# Patient Record
Sex: MALE | Race: WHITE | NOT HISPANIC OR LATINO | Employment: OTHER | URBAN - METROPOLITAN AREA
[De-identification: names, ages, dates, MRNs, and addresses within clinical notes are randomized per-mention and may not be internally consistent; named-entity substitution may affect disease eponyms.]

---

## 2017-02-20 ENCOUNTER — TRANSFERRED RECORDS (OUTPATIENT)
Dept: HEALTH INFORMATION MANAGEMENT | Facility: CLINIC | Age: 72
End: 2017-02-20

## 2017-02-21 ENCOUNTER — DOCUMENTATION ONLY (OUTPATIENT)
Dept: VASCULAR SURGERY | Facility: CLINIC | Age: 72
End: 2017-02-21

## 2017-04-13 ENCOUNTER — TELEPHONE (OUTPATIENT)
Dept: FAMILY MEDICINE | Facility: CLINIC | Age: 72
End: 2017-04-13

## 2017-04-13 NOTE — TELEPHONE ENCOUNTER
Patient calling to report that he went to give blood today and they rejected him as it was noted that he was having an irregular heart rate. They told him that it sounded like he was having extra beats. He was advised to follow up with his primary clinic. Patient reports no history of abnormal heart rhythm or heart issues. It is unknown how long he may have had this issue.    Abnormal heart rate     Onset: unknown      Intensity: mild    Progression of Symptoms:  same    Accompanying Signs & Symptoms:  Shortness of breath: no - although does feel winded when walking up stairs  Sweating: no   Nausea/vomiting: no  Lightheadedness: no  Palpitations: no  Fever/Chills: no  Cough: no  Heartburn: no    History:   Family history of heart disease no  Tobacco use: YES- former smoker, quit 5 years ago    Precipitating factors:   Worse with exertion: no  Worse with deep breaths :  no  Related to food: no    Alleviating factors:  none       Therapies Tried and outcome: none    Appointment scheduled with MD CALIN for 4/14/17. Advised patient of signs and symptoms to watch for that require emergency treatment.     Patient verbalized understanding and agrees with plan.    Will call back if further questions or concerns.    Myra Zavala, RN, BSN

## 2017-04-14 ENCOUNTER — OFFICE VISIT (OUTPATIENT)
Dept: FAMILY MEDICINE | Facility: CLINIC | Age: 72
End: 2017-04-14
Payer: COMMERCIAL

## 2017-04-14 VITALS
SYSTOLIC BLOOD PRESSURE: 114 MMHG | BODY MASS INDEX: 25.34 KG/M2 | HEIGHT: 71 IN | DIASTOLIC BLOOD PRESSURE: 74 MMHG | TEMPERATURE: 98.3 F | HEART RATE: 88 BPM | WEIGHT: 181 LBS | OXYGEN SATURATION: 95 %

## 2017-04-14 DIAGNOSIS — E11.9 TYPE 2 DIABETES MELLITUS WITHOUT COMPLICATION, WITHOUT LONG-TERM CURRENT USE OF INSULIN (H): ICD-10-CM

## 2017-04-14 DIAGNOSIS — Z71.89 ADVANCED DIRECTIVES, COUNSELING/DISCUSSION: ICD-10-CM

## 2017-04-14 DIAGNOSIS — I10 ESSENTIAL HYPERTENSION: ICD-10-CM

## 2017-04-14 DIAGNOSIS — E78.5 HYPERLIPIDEMIA LDL GOAL <70: ICD-10-CM

## 2017-04-14 DIAGNOSIS — I49.9 IRREGULAR HEART RHYTHM: Primary | ICD-10-CM

## 2017-04-14 LAB
ERYTHROCYTE [DISTWIDTH] IN BLOOD BY AUTOMATED COUNT: 13.1 % (ref 10–15)
HBA1C MFR BLD: 6.9 % (ref 4.3–6)
HCT VFR BLD AUTO: 50 % (ref 40–53)
HGB BLD-MCNC: 16.9 G/DL (ref 13.3–17.7)
MCH RBC QN AUTO: 30.4 PG (ref 26.5–33)
MCHC RBC AUTO-ENTMCNC: 33.8 G/DL (ref 31.5–36.5)
MCV RBC AUTO: 90 FL (ref 78–100)
PLATELET # BLD AUTO: 196 10E9/L (ref 150–450)
RBC # BLD AUTO: 5.56 10E12/L (ref 4.4–5.9)
WBC # BLD AUTO: 7.3 10E9/L (ref 4–11)

## 2017-04-14 PROCEDURE — 99214 OFFICE O/P EST MOD 30 MIN: CPT | Performed by: FAMILY MEDICINE

## 2017-04-14 PROCEDURE — 83036 HEMOGLOBIN GLYCOSYLATED A1C: CPT | Performed by: FAMILY MEDICINE

## 2017-04-14 PROCEDURE — 93000 ELECTROCARDIOGRAM COMPLETE: CPT | Performed by: FAMILY MEDICINE

## 2017-04-14 PROCEDURE — 36415 COLL VENOUS BLD VENIPUNCTURE: CPT | Performed by: FAMILY MEDICINE

## 2017-04-14 PROCEDURE — 80048 BASIC METABOLIC PNL TOTAL CA: CPT | Performed by: FAMILY MEDICINE

## 2017-04-14 PROCEDURE — 84443 ASSAY THYROID STIM HORMONE: CPT | Performed by: FAMILY MEDICINE

## 2017-04-14 PROCEDURE — 85027 COMPLETE CBC AUTOMATED: CPT | Performed by: FAMILY MEDICINE

## 2017-04-14 NOTE — ASSESSMENT & PLAN NOTE
Advance Care Planning 4/14/2017: ACP Review of Chart / Resources Provided:  Reviewed chart for advance care plan.  Jaylen ANDRIY De La Vega has no plan or code status on file however states presence of ACP document. Copy requested.   Added by Linda Wilcox

## 2017-04-14 NOTE — MR AVS SNAPSHOT
After Visit Summary   4/14/2017    Jaylen De La Vega    MRN: [id number]           Patient Information     Date Of Birth          1945        Visit Information        Provider Department      4/14/2017 9:40 AM Weiler, Karen, MD [facility] [place]        Today's Diagnoses     Irregular heart rhythm    -  1    Advanced directives, counseling/discussion        Hyperlipidemia LDL goal <70        Essential hypertension        Type 2 diabetes mellitus without complication, without long-term current use of insulin (H)           Follow-ups after your visit        Your next 10 appointments already scheduled     Apr 25, 2017 10:00 AM CDT   Holter Monitor with [facility] DEVICE [facility] ([facility])    [address]  [facility] [address] [phone]           LOCATION - [address], [address] [place] [address] **Please check-in at the [address] Registration Office, [address], in the [facility] building. When you are finished registering, please go to [address] and have a seat.              Future tests that were ordered for you today     Open Future Orders        Priority Expected Expires Ordered    Holter Monitor 48 hour - Adult Routine  5/29/2017 4/14/2017            Who to contact     If you have questions or need follow up information about today's clinic visit or your schedule please contact [facility] [place] directly at [phone].  Normal or non-critical lab and imaging results will be communicated to you by MyChart, letter or phone within 4 business days after the clinic has received the results. If you do not hear from us within 7 days, please contact the clinic through MyChart or phone. If you have a critical or abnormal lab result, we will notify you by phone as soon as possible.  Submit refill requests through [employer] or call your pharmacy and they will forward the refill request to us. Please  "allow 3 business days for your refill to be completed.          Additional Information About Your Visit        MyChart Information     [employer] gives you secure access to your electronic health record. If you see a primary care provider, you can also send messages to your care team and make appointments. If you have questions, please call your primary care clinic.  If you do not have a primary care provider, please call [phone] and they will assist you.        Care EveryWhere ID     This is your Care EveryWhere ID. This could be used by other organizations to access your [facility] medical records  [id number]        Your Vitals Were     Pulse Temperature Height Pulse Oximetry BMI (Body Mass Index)       88 98.3  F (36.8  C) (Oral) 5' 11\" (1.803 m) 95% 25.24 kg/m2        Blood Pressure from Last 3 Encounters:   04/14/17 114/74   12/13/16 121/74   10/05/16 107/64    Weight from Last 3 Encounters:   04/14/17 181 lb (82.1 kg)   12/13/16 180 lb (81.6 kg)   10/05/16 178 lb (80.7 kg)              We Performed the Following     Basic metabolic panel     CBC with platelets     EKG 12-lead complete w/read - Clinics     Hemoglobin A1c     TSH WITH FREE T4 REFLEX        Primary Care Provider Office Phone # Fax #    Tj Verma Jr., [phone]       [facility] [address]  [place] [address]        Thank you!     Thank you for choosing [facility]  for your care. Our goal is always to provide you with excellent care. Hearing back from our patients is one way we can continue to improve our services. Please take a few minutes to complete the written survey that you may receive in the mail after your visit with us. Thank you!             Your Updated Medication List - Protect others around you: Learn how to safely use, store and throw away your medicines at www.disposemymeds.org.          This list is accurate as of: 4/14/17  6:40 PM.  Always use your most recent med list.    "                Brand Name Dispense Instructions for use    albuterol 108 (90 BASE) MCG/ACT Inhaler    PROAIR HFA/PROVENTIL HFA/VENTOLIN HFA    1 Inhaler    Inhale 2 puffs into the lungs every 6 hours as needed for shortness of breath / dyspnea or wheezing       aspirin 81 MG tablet     30 tablet    Take 1 tablet (81 mg) by mouth daily       blood glucose monitoring test strip    ONE TOUCH VERIO IQ    100 each    by In Vitro route 2 times daily       carisoprodol 350 MG tablet    SOMA    60 tablet    Take 1 tablet (350 mg) by mouth 3 times daily as needed for muscle spasms       cetirizine 10 MG tablet    zyrTEC    30 tablet    Take 1 tablet (10 mg) by mouth every evening       fluticasone 50 MCG/ACT spray    FLONASE    1 Bottle    Spray 1-2 sprays into both nostrils daily       losartan-hydrochlorothiazide 100-25 MG per tablet    HYZAAR    90 tablet    Take 1 tablet by mouth daily       metFORMIN 500 MG tablet    GLUCOPHAGE    180 tablet    Take 1 tablet (500 mg) by mouth 2 times daily (with meals)       nitroglycerin 0.4 MG sublingual tablet    NITROSTAT    25 tablet    Place 1 tablet under the tongue every 5 minutes as needed for chest pain.       ONETOUCH DELICA LANCETS 33G Misc     100 each    1 each 2 times daily       simvastatin 20 MG tablet    ZOCOR    90 tablet    Take 1 tablet (20 mg) by mouth At Bedtime at bedtime.       VITAMIN B-1 PO      Take 50 mg by mouth       VITAMIN D PO      1 tablet qd

## 2017-04-14 NOTE — NURSING NOTE
"Chief Complaint   Patient presents with     Irregular Heart Beat       Initial /74  Pulse 88  Temp 98.3  F (36.8  C) (Oral)  Ht 5' 11\" (1.803 m)  Wt 181 lb (82.1 kg)  SpO2 95%  BMI 25.24 kg/m2 Estimated body mass index is 25.24 kg/(m^2) as calculated from the following:    Height as of this encounter: 5' 11\" (1.803 m).    Weight as of this encounter: 181 lb (82.1 kg).  Medication Reconciliation: complete   Linda iWlcox Medical Assistant      "

## 2017-04-14 NOTE — PROGRESS NOTES
"  SUBJECTIVE:                                                    Jaylen De La Vega is a 71 year old male who presents to clinic today for the following health issues:      Abnormal heart rate     Onset: unknown       Intensity: mild    Progression of Symptoms: same    Accompanying Signs & Symptoms:  Shortness of breath: no - although does feel winded when walking up stairs  Sweating: no   Nausea/vomiting: no  Lightheadedness: no  Palpitations: no  Fever/Chills: no  Cough: no  Heartburn: no    History:   Family history of heart disease no  Tobacco use: YES- former smoker, quit 5 years ago    Precipitating factors:   Worse with exertion: no  Worse with deep breaths : no  Related to food: no    Alleviating factors:  none     Therapies Tried and outcome: none     Cardiac:  Patient reports he went to donate blood yesterday, and was informed he has an irregular heart beat, and recommended to see his primary care doctor. He was not symptomatic at the time, and does not have a history of irregular heart beats in the past that he is aware of. He admits to a history of two episodes mild chest discomfort approximately two weeks ago, but has treated it with aspirin. He has had 2-3 stress tests taken, all of which have come back normal. He walks regularly, 2-3 miles a couple times a day. He is finding it harder to keep walking pace with his sister who is 10 years younger (and taller), but attributes that to age.     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 He states he sleeps \"with one ear open,\" and always has his phone on and next to him in case his wife calls him for help. He denies feeling more tired than usual.    Ear:  Patient reports he has an oily drainage from his ears whenever he sleeps on his sides, occurring for the past couple of years. If he doesn't wipe it right away it itches and burns. He has also noted stains on his pillow. He denies changes in his " "hearing.     Blood sugars:  Patient reports he does not check his blood sugars are regular as he should. He checked it this morning, and it read 160, which he believes is higher than it should be. He has not eaten yet today.     Problem list and histories reviewed & adjusted, as indicated.  Additional history: as documented    Reviewed and updated as needed this visit by clinical staff  Tobacco  Allergies  Meds  Med Hx  Surg Hx  Fam Hx  Soc Hx      Reviewed and updated as needed this visit by Provider       ROS:  Constitutional, HEENT, cardiovascular, pulmonary, gi and gu systems are negative, except as otherwise noted.    OBJECTIVE:                                                    /74  Pulse 88  Temp 98.3  F (36.8  C) (Oral)  Ht 5' 11\" (1.803 m)  Wt 181 lb (82.1 kg)  SpO2 95%  BMI 25.24 kg/m2  Body mass index is 25.24 kg/(m^2).  GENERAL: healthy, alert and no distress  HENT: ear canals and TM's normal, nose and mouth without ulcers or lesions  NECK: no adenopathy, no asymmetry, masses, or scars and thyroid normal to palpation  RESP: lungs clear to auscultation - no rales, rhonchi or wheezes  CV: regular rate and rhythm, normal S1 S2, no S3 or S4, no murmur, click or rub, no peripheral edema and peripheral pulses strong  NEURO: mentation intact and speech normal  PSYCH: mentation appears normal, affect normal/bright    Diagnostic Test Results:  EKG was done. NSR. No ST changes or arrhythmia    Lab Results   Component Value Date    A1C 6.9 04/14/2017    A1C 6.9 12/13/2016    A1C 7.2 06/25/2016    A1C 6.8 08/08/2015          ASSESSMENT/PLAN:                                                    (I49.9) Irregular heart rhythm  (primary encounter diagnosis)  Comment: Irregular rate and rhythm heard when patient went to give blood yesterday. History of HTN, HLD, and diabetes. Appears normal on exam here in clinic. Patient is asymptomatic. Will check labs today. Will do 48-hour heart monitor to ensure " everything is ok. If patient becomes symptomatic, go to ER for evaluation.  Plan: EKG 12-lead complete w/read - Clinics, TSH WITH        FREE T4 REFLEX, CBC with platelets, Basic         metabolic panel, Holter Monitor 48 hour - Adult        Follow up based on labs      (E78.5) Hyperlipidemia LDL goal <70  Comment: Stable  Plan: Follow up in 6mo      (I10) Essential hypertension  Comment: Blood pressure under good control today  Plan: Follow up in 6mo      (E11.9) Type 2 diabetes mellitus without complication, without long-term current use of insulin (H)  Comment: On metformin.  A1c is under good control today.We'll continue with metformin.  Plan: TSH WITH FREE T4 REFLEX, Hemoglobin A1c        Follow up based on labs        The information in this document, created by the medical scribe for me, accurately reflects the services I personally performed and the decisions made by me. I have reviewed and approved this document for accuracy prior to leaving the patient care area.  4/14/2017 10:09 AM         Karen Weiler, MD  [facility]

## 2017-04-15 LAB
ANION GAP SERPL CALCULATED.3IONS-SCNC: 6 MMOL/L (ref 3–14)
BUN SERPL-MCNC: 19 MG/DL (ref 7–30)
CALCIUM SERPL-MCNC: 9.8 MG/DL (ref 8.5–10.1)
CHLORIDE SERPL-SCNC: 102 MMOL/L (ref 94–109)
CO2 SERPL-SCNC: 32 MMOL/L (ref 20–32)
CREAT SERPL-MCNC: 1.11 MG/DL (ref 0.66–1.25)
GFR SERPL CREATININE-BSD FRML MDRD: 65 ML/MIN/1.7M2
GLUCOSE SERPL-MCNC: 126 MG/DL (ref 70–99)
POTASSIUM SERPL-SCNC: 4.5 MMOL/L (ref 3.4–5.3)
SODIUM SERPL-SCNC: 140 MMOL/L (ref 133–144)
TSH SERPL DL<=0.005 MIU/L-ACNC: 1.61 MU/L (ref 0.4–4)

## 2017-04-17 DIAGNOSIS — I10 ESSENTIAL HYPERTENSION: ICD-10-CM

## 2017-04-17 NOTE — TELEPHONE ENCOUNTER
losartan-hydrochlorothiazide (HYZAAR) 100-25 MG per tablet      Last Written Prescription Date: 11/2/2016  Last Fill Quantity: 90 tablet, # refills: 1  Last Office Visit with [facility], [facility] or [facility] prescribing provider: 4/14/2017       Potassium   Date Value Ref Range Status   04/14/2017 4.5 3.4 - 5.3 mmol/L Final     Creatinine   Date Value Ref Range Status   04/14/2017 1.11 0.66 - 1.25 mg/dL Final     BP Readings from Last 3 Encounters:   04/14/17 114/74   12/13/16 121/74   10/05/16 107/64

## 2017-04-18 NOTE — PROGRESS NOTES
Dear Jaylen,    Dr. Weiler has reviewed your recent labs, which were normal other than an elevated glucose. Your A1c level is at goal. Please continue on your Metformin and follow a low carbohydrate diet. A diabetic follow-up appointment in 3-6 months is recommended.    Please contact the clinic if you have additional questions.  Thank you.    Sincerely,    Sonja Arango PA-C  Physician extender for Dr. Karen Weiler

## 2017-04-19 RX ORDER — LOSARTAN POTASSIUM AND HYDROCHLOROTHIAZIDE 25; 100 MG/1; MG/1
1 TABLET ORAL DAILY
Qty: 90 TABLET | Refills: 3 | Status: SHIPPED | OUTPATIENT
Start: 2017-04-19 | End: 2018-05-04

## 2017-04-19 NOTE — TELEPHONE ENCOUNTER
Prescription approved per [facility] Refill Protocol.  Michelle Gleason RN   [facility] - Triage

## 2017-04-25 ENCOUNTER — HOSPITAL ENCOUNTER (OUTPATIENT)
Dept: CARDIOLOGY | Facility: CLINIC | Age: 72
Discharge: HOME | End: 2017-04-25
Attending: FAMILY MEDICINE | Admitting: FAMILY MEDICINE
Payer: MEDICARE

## 2017-04-25 DIAGNOSIS — I49.9 IRREGULAR HEART RHYTHM: ICD-10-CM

## 2017-04-25 PROCEDURE — 93226 XTRNL ECG REC<48 HR SCAN A/R: CPT

## 2017-04-25 PROCEDURE — 93227 XTRNL ECG REC<48 HR R&I: CPT | Performed by: INTERNAL MEDICINE

## 2017-05-01 PROBLEM — I49.1 PREMATURE ATRIAL CONTRACTIONS: Status: ACTIVE | Noted: 2017-05-01

## 2017-05-01 NOTE — PROGRESS NOTES
Mr. De La Vega,    Your 48 hour heart monitor showed multiple irregular beats each consistent with a non-worrisome supraventricular pre-mature beat.  Essentially, these are palpitations.  They sometimes cause symptoms, but many times patients don't even notice they're there.  This would explain the recent irregular heartbeat noted when you donated blood.  No further workup nor treatment is necessary given the normal EKG done with Dr. Weiler recently and now the findings on your 48 hour heart monitor.      If you have further questions about the interpretation of your labs, labtestsonline.org is a good website to check out for further information.    Please contact the clinic if you have additional questions.  Thank you.    Sincerely,    Tj Verma MD

## 2017-05-10 ENCOUNTER — TELEPHONE (OUTPATIENT)
Dept: FAMILY MEDICINE | Facility: CLINIC | Age: 72
End: 2017-05-10

## 2017-05-10 NOTE — TELEPHONE ENCOUNTER
I called and reviewed his Holter monitoring. We discussed in detail per his 48 hour Holter monitor report, and he has no further questions at this time. I advised him to call us back should any questions or concerns arise. Kathleen Faust R.N.

## 2017-05-10 NOTE — TELEPHONE ENCOUNTER
Reason for Call: Patient called in to get the results of his heart monitor test    Best phone number to reach pt at is: [phone] or [phone]  Ok to leave a message with medical info? YES    Pharmacy preferred (if calling for a refill): [facility]  [employer] [facility]-Patient Representative

## 2017-05-19 DIAGNOSIS — E78.2 MIXED HYPERLIPIDEMIA: ICD-10-CM

## 2017-05-19 RX ORDER — SIMVASTATIN 20 MG
20 TABLET ORAL AT BEDTIME
Qty: 90 TABLET | Refills: 0 | Status: SHIPPED | OUTPATIENT
Start: 2017-05-19 | End: 2017-09-12

## 2017-05-19 NOTE — TELEPHONE ENCOUNTER
Prescription approved per [facility], [facility] or [facility] refill protocol.  Martine Montez RN  Triage [employer]

## 2017-05-19 NOTE — TELEPHONE ENCOUNTER
simvastatin (ZOCOR) 20 MG tablet     Last Written Prescription Date: 10/24/2016  Last Fill Quantity: 90 tablet, # refills: 1  Last Office Visit with [facility], [facility] or [facility] prescribing provider: 4/14/2017       Lab Results   Component Value Date    CHOL 142 06/25/2016     Lab Results   Component Value Date    HDL 41 06/25/2016     Lab Results   Component Value Date    LDL 76 06/25/2016     Lab Results   Component Value Date    TRIG 125 06/25/2016     Lab Results   Component Value Date    CHOLHDLRJADEO 4.3 06/10/2014

## 2017-06-05 ENCOUNTER — TELEPHONE (OUTPATIENT)
Dept: FAMILY MEDICINE | Facility: CLINIC | Age: 72
End: 2017-06-05

## 2017-06-05 NOTE — TELEPHONE ENCOUNTER
Spoke with Vanesa, who was transferred to be after scheduling appointment for Jaylen next week. She says he is having issues with allergies this year. Has taken OTC allergy medications. Now using Flonase or similar but has not used consistently or for less than a few weeks. I reviewed how to take OTC allergy medications and that will get peak results after using for at least a few weeks.     She discussed Holter report and he was unable to give blood due to his irregular heart rate. I did speak directly with Alex regarding this previously. I informed I will mail them a paper copy of Dr. Verma's interpretation/note regarding the 48 hour Holter that can be found attached in Epic to the Holter report. Vanesa said this would be really helpful.     Says he is also having some shortness of breath more often and with exertion. She says it has been building up over many months. Patient will be seeing Dr. Verma next week, advised to discuss at that time and to call us back if symptoms worsen prior to that appointment. Vanesa in agreement with this plan. Kathleen Faust R.N.

## 2017-07-12 DIAGNOSIS — E11.9 TYPE 2 DIABETES MELLITUS WITHOUT COMPLICATION, WITHOUT LONG-TERM CURRENT USE OF INSULIN (H): ICD-10-CM

## 2017-07-12 NOTE — TELEPHONE ENCOUNTER
metFORMIN (GLUCOPHAGE) 500 MG tablet         Last Written Prescription Date: 12/13/2016  Last Fill Quantity: 180 tablet, # refills: 1  Last Office Visit with [facility], [facility] or [facility] prescribing provider:  4/14/2017        BP Readings from Last 3 Encounters:   04/14/17 114/74   12/13/16 121/74   10/05/16 107/64     Lab Results   Component Value Date    MICROL 15 12/13/2016     Lab Results   Component Value Date    UMALCR 11.41 12/13/2016     Creatinine   Date Value Ref Range Status   04/14/2017 1.11 0.66 - 1.25 mg/dL Final   ]  GFR Estimate   Date Value Ref Range Status   04/14/2017 65 >60 mL/min/1.7m2 Final     Comment:     Non  GFR Calc   06/25/2016 68 >60 mL/min/1.7m2 Final     Comment:     Non  GFR Calc   06/01/2015 70 >60 mL/min/1.7m2 Final     Comment:     Non  GFR Calc     GFR Estimate If Black   Date Value Ref Range Status   04/14/2017 79 >60 mL/min/1.7m2 Final     Comment:      GFR Calc   06/25/2016 83 >60 mL/min/1.7m2 Final     Comment:      GFR Calc   06/01/2015 85 >60 mL/min/1.7m2 Final     Comment:      GFR Calc     Lab Results   Component Value Date    CHOL 142 06/25/2016     Lab Results   Component Value Date    HDL 41 06/25/2016     Lab Results   Component Value Date    LDL 76 06/25/2016     Lab Results   Component Value Date    TRIG 125 06/25/2016     Lab Results   Component Value Date    CHOLHDLRATIO 4.3 06/10/2014     Lab Results   Component Value Date    AST 26 06/25/2016     Lab Results   Component Value Date    ALT 33 06/25/2016     Lab Results   Component Value Date    A1C 6.9 04/14/2017    A1C 6.9 12/13/2016    A1C 7.2 06/25/2016    A1C 6.8 08/08/2015     Potassium   Date Value Ref Range Status   04/14/2017 4.5 3.4 - 5.3 mmol/L Final

## 2017-07-13 NOTE — TELEPHONE ENCOUNTER
Prescription approved per [facility] Refill Protocol.  Kathleen Faust R.N.

## 2017-09-12 DIAGNOSIS — E78.2 MIXED HYPERLIPIDEMIA: ICD-10-CM

## 2017-09-12 NOTE — TELEPHONE ENCOUNTER
simvastatin (ZOCOR) 20 MG tablet  Last Written Prescription Date: 5/19/2017  Last Fill Quantity: 90 tablet, # refills: 0  Last Office Visit with [facility], [facility] or [facility] prescribing provider: 4/14/2017       Lab Results   Component Value Date    CHOL 142 06/25/2016     Lab Results   Component Value Date    HDL 41 06/25/2016     Lab Results   Component Value Date    LDL 76 06/25/2016     Lab Results   Component Value Date    TRIG 125 06/25/2016     Lab Results   Component Value Date    CHOLHDLRJADEO 4.3 06/10/2014

## 2017-09-14 RX ORDER — SIMVASTATIN 20 MG
20 TABLET ORAL AT BEDTIME
Qty: 30 TABLET | Refills: 0 | Status: SHIPPED | OUTPATIENT
Start: 2017-09-14 | End: 2017-10-17

## 2017-09-14 NOTE — TELEPHONE ENCOUNTER
Medication is being filled for 1 time refill only due to:  Patient needs labs FLP. Routing to team to inform and assist in scheduling.  Michelle Gleason RN   [facility] - Triage

## 2017-09-15 ENCOUNTER — TELEPHONE (OUTPATIENT)
Dept: FAMILY MEDICINE | Facility: CLINIC | Age: 72
End: 2017-09-15

## 2017-09-15 DIAGNOSIS — E11.9 TYPE 2 DIABETES MELLITUS WITHOUT COMPLICATION, WITHOUT LONG-TERM CURRENT USE OF INSULIN (H): Primary | ICD-10-CM

## 2017-09-15 DIAGNOSIS — E78.5 HYPERLIPIDEMIA LDL GOAL <70: ICD-10-CM

## 2017-09-15 NOTE — TELEPHONE ENCOUNTER
This patient is scheduled for a DM check on 10/17/2017 with fasting labs scheduled for 10/14/2017.  Labs have been pended.  Provider, please review orders, attach dx and add any additional labs needed.  Thank you.  Shagufta Wilson

## 2017-10-14 DIAGNOSIS — E78.5 HYPERLIPIDEMIA LDL GOAL <70: ICD-10-CM

## 2017-10-14 DIAGNOSIS — E11.9 TYPE 2 DIABETES MELLITUS WITHOUT COMPLICATION, WITHOUT LONG-TERM CURRENT USE OF INSULIN (H): ICD-10-CM

## 2017-10-14 LAB
CHOLEST SERPL-MCNC: 150 MG/DL
CREAT UR-MCNC: 145 MG/DL
HBA1C MFR BLD: 7.3 % (ref 4.3–6)
HDLC SERPL-MCNC: 49 MG/DL
LDLC SERPL CALC-MCNC: 78 MG/DL
MICROALBUMIN UR-MCNC: 84 MG/L
MICROALBUMIN/CREAT UR: 57.86 MG/G CR (ref 0–17)
NONHDLC SERPL-MCNC: 101 MG/DL
TRIGL SERPL-MCNC: 113 MG/DL
TSH SERPL DL<=0.005 MIU/L-ACNC: 1.29 MU/L (ref 0.4–4)

## 2017-10-14 PROCEDURE — 80061 LIPID PANEL: CPT | Performed by: FAMILY MEDICINE

## 2017-10-14 PROCEDURE — 84443 ASSAY THYROID STIM HORMONE: CPT | Performed by: FAMILY MEDICINE

## 2017-10-14 PROCEDURE — 82043 UR ALBUMIN QUANTITATIVE: CPT | Performed by: FAMILY MEDICINE

## 2017-10-14 PROCEDURE — 36415 COLL VENOUS BLD VENIPUNCTURE: CPT | Performed by: FAMILY MEDICINE

## 2017-10-14 PROCEDURE — 83036 HEMOGLOBIN GLYCOSYLATED A1C: CPT | Performed by: FAMILY MEDICINE

## 2017-10-17 ENCOUNTER — OFFICE VISIT (OUTPATIENT)
Dept: FAMILY MEDICINE | Facility: CLINIC | Age: 72
End: 2017-10-17
Payer: COMMERCIAL

## 2017-10-17 VITALS
OXYGEN SATURATION: 95 % | SYSTOLIC BLOOD PRESSURE: 128 MMHG | DIASTOLIC BLOOD PRESSURE: 76 MMHG | WEIGHT: 176 LBS | HEIGHT: 71 IN | HEART RATE: 105 BPM | BODY MASS INDEX: 24.64 KG/M2 | TEMPERATURE: 97.6 F

## 2017-10-17 DIAGNOSIS — E78.2 MIXED HYPERLIPIDEMIA: ICD-10-CM

## 2017-10-17 DIAGNOSIS — E11.9 TYPE 2 DIABETES MELLITUS WITHOUT COMPLICATION, WITHOUT LONG-TERM CURRENT USE OF INSULIN (H): Primary | ICD-10-CM

## 2017-10-17 DIAGNOSIS — J44.9 CHRONIC OBSTRUCTIVE PULMONARY DISEASE, UNSPECIFIED COPD TYPE (H): ICD-10-CM

## 2017-10-17 DIAGNOSIS — Z23 NEED FOR PROPHYLACTIC VACCINATION AND INOCULATION AGAINST INFLUENZA: ICD-10-CM

## 2017-10-17 PROCEDURE — 99214 OFFICE O/P EST MOD 30 MIN: CPT | Mod: 25 | Performed by: FAMILY MEDICINE

## 2017-10-17 PROCEDURE — 90662 IIV NO PRSV INCREASED AG IM: CPT | Performed by: FAMILY MEDICINE

## 2017-10-17 PROCEDURE — G0008 ADMIN INFLUENZA VIRUS VAC: HCPCS | Performed by: FAMILY MEDICINE

## 2017-10-17 RX ORDER — ALBUTEROL SULFATE 90 UG/1
2 AEROSOL, METERED RESPIRATORY (INHALATION) EVERY 6 HOURS PRN
Qty: 1 INHALER | Refills: 3 | Status: SHIPPED | OUTPATIENT
Start: 2017-10-17 | End: 2018-09-24

## 2017-10-17 RX ORDER — SIMVASTATIN 20 MG
20 TABLET ORAL AT BEDTIME
Qty: 90 TABLET | Refills: 3 | Status: SHIPPED | OUTPATIENT
Start: 2017-10-17 | End: 2018-09-24

## 2017-10-17 NOTE — MR AVS SNAPSHOT
After Visit Summary   10/17/2017    Jaylen De La Vega    MRN: [id number]           Patient Information     Date Of Birth          1945        Visit Information        Provider Department      10/17/2017 8:40 AM Tj Verma Jr., MD [facility]        Today's Diagnoses     Type 2 diabetes mellitus without complication, without long-term current use of insulin (H)    -  1    Mixed hyperlipidemia        Need for prophylactic vaccination and inoculation against influenza        Chronic obstructive pulmonary disease, unspecified COPD type (H)           Follow-ups after your visit        Follow-up notes from your care team     Return in about 6 months (around 4/17/2018) for Diabetes Recheck.      Who to contact     If you have questions or need follow up information about today's clinic visit or your schedule please contact [facility] directly at [phone].  Normal or non-critical lab and imaging results will be communicated to you by MyChart, letter or phone within 4 business days after the clinic has received the results. If you do not hear from us within 7 days, please contact the clinic through [employer] or phone. If you have a critical or abnormal lab result, we will notify you by phone as soon as possible.  Submit refill requests through [facility] or call your pharmacy and they will forward the refill request to us. Please allow 3 business days for your refill to be completed.          Additional Information About Your Visit        [employer] Information     [employer] gives you secure access to your electronic health record. If you see a primary care provider, you can also send messages to your care team and make appointments. If you have questions, please call your primary care clinic.  If you do not have a primary care provider, please call [phone] and they will assist you.        Care EveryWhere ID     This is your Care EveryWhere ID. This could be used by other  "organizations to access your [place] medical records  [id number]        Your Vitals Were     Pulse Temperature Height Pulse Oximetry BMI (Body Mass Index)       105 97.6  F (36.4  C) (Oral) 5' 11\" (1.803 m) 95% 24.55 kg/m2        Blood Pressure from Last 3 Encounters:   10/17/17 144/84   04/14/17 114/74   12/13/16 121/74    Weight from Last 3 Encounters:   10/17/17 176 lb (79.8 kg)   04/14/17 181 lb (82.1 kg)   12/13/16 180 lb (81.6 kg)              We Performed the Following     FLU VACCINE, INCREASED ANTIGEN, PRESV FREE          Where to get your medicines      These medications were sent to [facility] [address] [facility] - [place], [place] [facility] [address] [address]  [address] [facility] [address] [facility] [address]     Phone:  [phone]     albuterol 108 (90 BASE) MCG/ACT Inhaler    simvastatin 20 MG tablet          Primary Care Provider Office Phone # Fax #    Tj Verma Jr., [phone]  [place] [address]        Equal Access to Services     [facility]: Hadii aad ku hadasho Soomaali, waaxda luqadaha, qaybta kaalmada adeegyada, waxay maryin haymarlysn haylee jean ah. So [facility] [phone].    ATENCIÓN: Si habla español, tiene a cunningham disposición servicios gratuitos de asistencia lingüística. Llame al [phone].    We comply with applicable federal civil rights laws and [place] laws. We do not discriminate on the basis of race, color, national origin, age, disability, sex, sexual orientation, or gender identity.            Thank you!     Thank you for choosing [facility]  for your care. Our goal is always to provide you with excellent care. Hearing back from our patients is one way we can continue to improve our services. Please take a few minutes to complete the written survey that you may receive in the mail after your visit with us. Thank you!             Your Updated Medication List - Protect others around you: Learn how to safely use, store and throw away your " medicines at www.disposemymeds.org.          This list is accurate as of: 10/17/17  9:16 AM.  Always use your most recent med list.                   Brand Name Dispense Instructions for use Diagnosis    albuterol 108 (90 BASE) MCG/ACT Inhaler    PROAIR HFA/PROVENTIL HFA/VENTOLIN HFA    1 Inhaler    Inhale 2 puffs into the lungs every 6 hours as needed for shortness of breath / dyspnea or wheezing    Chronic obstructive pulmonary disease, unspecified COPD type (H)       aspirin 81 MG tablet     30 tablet    Take 1 tablet (81 mg) by mouth daily    Type 2 diabetes mellitus without complication, without long-term current use of insulin (H)       blood glucose monitoring test strip    ONE TOUCH VERIO IQ    100 each    by In Vitro route 2 times daily    Type 2 diabetes mellitus without complication, without long-term current use of insulin (H)       carisoprodol 350 MG tablet    SOMA    60 tablet    Take 1 tablet (350 mg) by mouth 3 times daily as needed for muscle spasms    Spasm of back muscles       cetirizine 10 MG tablet    zyrTEC    30 tablet    Take 1 tablet (10 mg) by mouth every evening    Post-nasal drip       fluticasone 50 MCG/ACT spray    FLONASE    1 Bottle    Spray 1-2 sprays into both nostrils daily    Seasonal allergic rhinitis       losartan-hydrochlorothiazide 100-25 MG per tablet    HYZAAR    90 tablet    Take 1 tablet by mouth daily    Essential hypertension       metFORMIN 500 MG tablet    GLUCOPHAGE    180 tablet    Take 1 tablet (500 mg) by mouth 2 times daily (with meals)    Type 2 diabetes mellitus without complication, without long-term current use of insulin (H)       nitroGLYcerin 0.4 MG sublingual tablet    NITROSTAT    25 tablet    Place 1 tablet under the tongue every 5 minutes as needed for chest pain.    Chest pain       ONETOUCH DELICA LANCETS 33G Misc     100 each    1 each 2 times daily    Type 2 diabetes mellitus without complication, without long-term current use of insulin (H)        simvastatin 20 MG tablet    ZOCOR    90 tablet    Take 1 tablet (20 mg) by mouth At Bedtime at bedtime.    Mixed hyperlipidemia       VITAMIN B-1 PO      Take 50 mg by mouth        VITAMIN D PO      1 tablet qd

## 2017-10-17 NOTE — PROGRESS NOTES
Mr. De La Vega,    -All of your labs are normal (for the most part).  We'll discuss your lab results at your upcoming appointment tomorrow.    If you have further questions about the interpretation of your labs, [email or web address] is a good website to check out for further information.    Please contact the clinic if you have additional questions.  Thank you.    Sincerely,    Tj Verma MD

## 2017-10-17 NOTE — NURSING NOTE
"Chief Complaint   Patient presents with     Diabetes       Initial /84  Pulse 105  Temp 97.6  F (36.4  C) (Oral)  Ht 5' 11\" (1.803 m)  Wt 176 lb (79.8 kg)  SpO2 95%  BMI 24.55 kg/m2 Estimated body mass index is 24.55 kg/(m^2) as calculated from the following:    Height as of this encounter: 5' 11\" (1.803 m).    Weight as of this encounter: 176 lb (79.8 kg).  Medication Reconciliation: complete    "

## 2017-10-17 NOTE — PROGRESS NOTES
SUBJECTIVE:   Jaylen De La Vega is a 71 year old male who presents to clinic today for the following health issues:      Diabetes Follow-up    Patient is checking blood sugars: 3-4 times a week; usually in 120's. Missed a few doses of Metformin recently and sugars have been a little high    Diabetic concerns: None     Symptoms of hypoglycemia (low blood sugar): none     Paresthesias (numbness or burning in feet) or sores: No     Date of last diabetic eye exam: February 2017        Amount of exercise or physical activity: 6-7 days/week for an average of 30-45 minutes    Problems taking medications regularly: No, lately has missed a few doses    Medication side effects: none  Diet: regular (no restrictions)        Problem list and histories reviewed & adjusted, as indicated.  Additional history: as documented    Recent Labs   Lab Test  10/14/17   0822  10/14/17   0820  04/14/17   1040  12/13/16   1110  06/25/16   0933   06/01/15   1444  06/10/14   1112   A1C  7.3*   --   6.9*  6.9*  7.2*   < >   --    --    LDL   --   78   --    --   76   --    --   73   HDL   --   49   --    --   41   --    --   34*   TRIG   --   113   --    --   125   --    --   195*   ALT   --    --    --    --   33   --   31  31   CR   --    --   1.11   --   1.07   --   1.05  1.06   GFRESTIMATED   --    --   65   --   68   --   70  70   GFRESTBLACK   --    --   79   --   83   --   85  84   POTASSIUM   --    --   4.5   --   3.6   --   3.7  4.0   TSH   --   1.29  1.61   --    --    --    --    --     < > = values in this interval not displayed.      BP Readings from Last 3 Encounters:   10/17/17 128/76   04/14/17 114/74   12/13/16 121/74    Wt Readings from Last 3 Encounters:   10/17/17 176 lb (79.8 kg)   04/14/17 181 lb (82.1 kg)   12/13/16 180 lb (81.6 kg)                  Labs reviewed in EPIC        Reviewed and updated as needed this visit by clinical staffAllergies  Meds       Reviewed and updated as needed this visit by Provider      "    ROS:  Constitutional, HEENT, cardiovascular, pulmonary, gi and gu systems are negative, except as otherwise noted.      OBJECTIVE:   /76  Pulse 105  Temp 97.6  F (36.4  C) (Oral)  Ht 5' 11\" (1.803 m)  Wt 176 lb (79.8 kg)  SpO2 95%  BMI 24.55 kg/m2  Body mass index is 24.55 kg/(m^2).  GENERAL: healthy, alert and no distress    Diagnostic Test Results:  No results found for this or any previous visit (from the past 24 hour(s)).    ASSESSMENT/PLAN:             1. Type 2 diabetes mellitus without complication, without long-term current use of insulin (H)  Continued good glycemic control with hemoglobin A1c of 7.3. He has some urine albumin presents about 50 but is on an angiotensin receptor blocker and blood pressure is at goal today. Follow-up with me in 6 months.    2. Mixed hyperlipidemia  Lipid panel shows LDL cholesterol less than 100. Continue on simvastatin.  - simvastatin (ZOCOR) 20 MG tablet; Take 1 tablet (20 mg) by mouth At Bedtime at bedtime.  Dispense: 90 tablet; Refill: 3    3. Need for prophylactic vaccination and inoculation against influenza  Flu vaccine is given today.  - FLU VACCINE, INCREASED ANTIGEN, PRESV FREE    4. Chronic obstructive pulmonary disease, unspecified COPD type (H)  Lung disease is stable. Albuterol is renewed.  - albuterol (PROAIR HFA/PROVENTIL HFA/VENTOLIN HFA) 108 (90 BASE) MCG/ACT Inhaler; Inhale 2 puffs into the lungs every 6 hours as needed for shortness of breath / dyspnea or wheezing  Dispense: 1 Inhaler; Refill: 3    Nik is been under increasing stress as his wife Amie has progressed significantly in her heart failure and other chronic medical problems. [facility] home health and hospice are working with her in their home. Nik is not getting much sleep and is spending increasing amounts of time tending to his wife's needs and her terminal illnesses.    See Patient Instructions    Tj Verma Jr, MD  [facility]    "

## 2017-11-01 ENCOUNTER — OFFICE VISIT (OUTPATIENT)
Dept: FAMILY MEDICINE | Facility: CLINIC | Age: 72
End: 2017-11-01
Payer: COMMERCIAL

## 2017-11-01 VITALS
OXYGEN SATURATION: 96 % | WEIGHT: 176 LBS | HEIGHT: 71 IN | BODY MASS INDEX: 24.64 KG/M2 | SYSTOLIC BLOOD PRESSURE: 122 MMHG | TEMPERATURE: 98 F | DIASTOLIC BLOOD PRESSURE: 70 MMHG | HEART RATE: 110 BPM

## 2017-11-01 DIAGNOSIS — J06.9 VIRAL UPPER RESPIRATORY TRACT INFECTION: Primary | ICD-10-CM

## 2017-11-01 PROCEDURE — 99213 OFFICE O/P EST LOW 20 MIN: CPT | Performed by: FAMILY MEDICINE

## 2017-11-01 NOTE — MR AVS SNAPSHOT
"              After Visit Summary   11/1/2017    Jaylen De La Vega    MRN: [id number]           Patient Information     Date Of Birth          1945        Visit Information        Provider Department      11/1/2017 2:20 PM Tj Verma Jr., MD [facility]        Today's Diagnoses     Viral upper respiratory tract infection    -  1       Follow-ups after your visit        Follow-up notes from your care team     Return in about 10 days (around 11/11/2017), or if symptoms worsen or fail to improve.      Who to contact     If you have questions or need follow up information about today's clinic visit or your schedule please contact [facility] directly at [phone].  Normal or non-critical lab and imaging results will be communicated to you by MyChart, letter or phone within 4 business days after the clinic has received the results. If you do not hear from us within 7 days, please contact the clinic through [employer] or phone. If you have a critical or abnormal lab result, we will notify you by phone as soon as possible.  Submit refill requests through [facility] or call your pharmacy and they will forward the refill request to us. Please allow 3 business days for your refill to be completed.          Additional Information About Your Visit        MyChart Information     [facility] gives you secure access to your electronic health record. If you see a primary care provider, you can also send messages to your care team and make appointments. If you have questions, please call your primary care clinic.  If you do not have a primary care provider, please call [phone] and they will assist you.        Care EveryWhere ID     This is your Care EveryWhere ID. This could be used by other organizations to access your [facility] medical records  [id number]        Your Vitals Were     Pulse Temperature Height Pulse Oximetry BMI (Body Mass Index)       110 98  F (36.7  C) (Oral) 5' 11\" (1.803 m) " 96% 24.55 kg/m2        Blood Pressure from Last 3 Encounters:   11/01/17 122/70   10/17/17 128/76   04/14/17 114/74    Weight from Last 3 Encounters:   11/01/17 176 lb (79.8 kg)   10/17/17 176 lb (79.8 kg)   04/14/17 181 lb (82.1 kg)              Today, you had the following     No orders found for display       Primary Care Provider Office Phone # Fax #    Tj Verma Jr., [phone]  [place] [address]        Equal Access to Services     [facility]: Hadii aad ku hadasho Soomaali, waaxda luqadaha, qaybta kaalmada adeegyada, maddi muniz hayarmando jean . So [facility] [phone].    ATENCIÓN: Si habla español, tiene a cunningham disposición servicios gratuitos de asistencia lingüística. Llame al [phone].    We comply with applicable federal civil rights laws and [place] laws. We do not discriminate on the basis of race, color, national origin, age, disability, sex, sexual orientation, or gender identity.            Thank you!     Thank you for choosing [facility]  for your care. Our goal is always to provide you with excellent care. Hearing back from our patients is one way we can continue to improve our services. Please take a few minutes to complete the written survey that you may receive in the mail after your visit with us. Thank you!             Your Updated Medication List - Protect others around you: Learn how to safely use, store and throw away your medicines at www.disposemymeds.org.          This list is accurate as of: 11/1/17  2:48 PM.  Always use your most recent med list.                   Brand Name Dispense Instructions for use Diagnosis    albuterol 108 (90 BASE) MCG/ACT Inhaler    PROAIR HFA/PROVENTIL HFA/VENTOLIN HFA    1 Inhaler    Inhale 2 puffs into the lungs every 6 hours as needed for shortness of breath / dyspnea or wheezing    Chronic obstructive pulmonary disease, unspecified COPD type (H)       aspirin 81 MG tablet      30 tablet    Take 1 tablet (81 mg) by mouth daily    Type 2 diabetes mellitus without complication, without long-term current use of insulin (H)       blood glucose monitoring test strip    ONE TOUCH VERIO IQ    100 each    by In Vitro route 2 times daily    Type 2 diabetes mellitus without complication, without long-term current use of insulin (H)       carisoprodol 350 MG tablet    SOMA    60 tablet    Take 1 tablet (350 mg) by mouth 3 times daily as needed for muscle spasms    Spasm of back muscles       cetirizine 10 MG tablet    zyrTEC    30 tablet    Take 1 tablet (10 mg) by mouth every evening    Post-nasal drip       fluticasone 50 MCG/ACT spray    FLONASE    1 Bottle    Spray 1-2 sprays into both nostrils daily    Seasonal allergic rhinitis       losartan-hydrochlorothiazide 100-25 MG per tablet    HYZAAR    90 tablet    Take 1 tablet by mouth daily    Essential hypertension       metFORMIN 500 MG tablet    GLUCOPHAGE    180 tablet    Take 1 tablet (500 mg) by mouth 2 times daily (with meals)    Type 2 diabetes mellitus without complication, without long-term current use of insulin (H)       nitroGLYcerin 0.4 MG sublingual tablet    NITROSTAT    25 tablet    Place 1 tablet under the tongue every 5 minutes as needed for chest pain.    Chest pain       ONETOUCH DELICA LANCETS 33G Misc     100 each    1 each 2 times daily    Type 2 diabetes mellitus without complication, without long-term current use of insulin (H)       simvastatin 20 MG tablet    ZOCOR    90 tablet    Take 1 tablet (20 mg) by mouth At Bedtime at bedtime.    Mixed hyperlipidemia       VITAMIN B-1 PO      Take 50 mg by mouth        VITAMIN D PO      1 tablet qd

## 2017-11-01 NOTE — PROGRESS NOTES
"  SUBJECTIVE:   Jaylen De La Vega is a 71 year old male who presents to clinic today for the following health issues:      Acute Illness   Acute illness concerns: sinus issues   Onset: 4 days    Fever: no    Chills/Sweats: no    Headache (location?): no    Sinus Pressure:YES    Conjunctivitis:  no    Ear Pain: no    Rhinorrhea: YES    Congestion: no    Sore Throat: no     Cough: YES-non-productive    Wheeze: no    Decreased Appetite: no    Nausea: YES    Vomiting: no    Diarrhea:  no    Dysuria/Freq.: no    Fatigue/Achiness: YES- hasn't been able to sleep     Sick/Strep Exposure: YES- unsure has been around lots of people      Therapies Tried and outcome: cough drops,  OTC benadryl and flonase (OTC)            Problem list and histories reviewed & adjusted, as indicated.  Additional history: as documented        Reviewed and updated as needed this visit by clinical staff     Reviewed and updated as needed this visit by Provider         ROS:  Constitutional, HEENT, cardiovascular, pulmonary, gi and gu systems are negative, except as otherwise noted.      OBJECTIVE:   /70  Pulse 110  Temp 98  F (36.7  C) (Oral)  Ht 5' 11\" (1.803 m)  Wt 176 lb (79.8 kg)  SpO2 96%  BMI 24.55 kg/m2  Body mass index is 24.55 kg/(m^2).  GENERAL: healthy, alert and no distress  EYES: Eyes grossly normal to inspection, PERRL and conjunctivae and sclerae normal  HENT: ear canals and TM's normal, nose and mouth without ulcers or lesions  NECK: no adenopathy, no asymmetry, masses, or scars and thyroid normal to palpation  RESP: lungs clear to auscultation - no rales, rhonchi or wheezes  CV: regular rate and rhythm, normal S1 S2, no S3 or S4, no murmur, click or rub, no peripheral edema and peripheral pulses strong  MS: no gross musculoskeletal defects noted, no edema    Diagnostic Test Results:  none     ASSESSMENT/PLAN:             1. Viral upper respiratory tract infection  likely viral.  Advised of OTC options for symptomatic " treatment. Return to clinic in 10-14 days if not improving, sooner if worsens.       See Patient Instructions    Tj Verma Jr, MD  [facility]

## 2017-11-01 NOTE — NURSING NOTE
"Chief Complaint   Patient presents with     URI       Initial /70  Pulse 110  Temp 98  F (36.7  C) (Oral)  Ht 5' 11\" (1.803 m)  Wt 176 lb (79.8 kg)  SpO2 96%  BMI 24.55 kg/m2 Estimated body mass index is 24.55 kg/(m^2) as calculated from the following:    Height as of this encounter: 5' 11\" (1.803 m).    Weight as of this encounter: 176 lb (79.8 kg).  Medication Reconciliation: complete  "

## 2018-01-19 DIAGNOSIS — E11.9 TYPE 2 DIABETES MELLITUS WITHOUT COMPLICATION, WITHOUT LONG-TERM CURRENT USE OF INSULIN (H): ICD-10-CM

## 2018-01-19 NOTE — TELEPHONE ENCOUNTER
"Requested Prescriptions   Pending Prescriptions Disp Refills     metFORMIN (GLUCOPHAGE) 500 MG tablet  Last Written Prescription Date:  7/13/2017  Last Fill Quantity: 180 tablet,  # refills: 1   Last Office Visit with G, LUCIEN or [facility] prescribing provider:  11/1/2017  Future Office Visit:      180 tablet 1     Sig: Take 1 tablet (500 mg) by mouth 2 times daily (with meals)    Biguanide Agents Passed    1/19/2018  1:35 PM       Passed - Patient's BP is less than 140/90    BP Readings from Last 3 Encounters:   11/01/17 122/70   10/17/17 128/76   04/14/17 114/74          Passed - Patient has documented LDL within the past 12 mos.    Recent Labs   Lab Test  10/14/17   0820   LDL  78          Passed - Patient has had a Microalbumin in the past 12 mos.    Recent Labs   Lab Test  10/14/17   0827   MICROL  84   UMALCR  57.86*          Passed - Patient is age 10 or older       Passed - Patient has documented A1c within the specified period of time.    Recent Labs   Lab Test  10/14/17   0822   A1C  7.3*          Passed - Patient's CR is NOT>1.4 OR Patient's EGFR is NOT<45 within past 12 mos.    Recent Labs   Lab Test  04/14/17   1040   GFRESTIMATED  65   GFRESTBLACK  79     Recent Labs   Lab Test  04/14/17   1040   CR  1.11          Passed - Patient does NOT have a diagnosis of CHF.       Passed - Recent (6 mos) or future visit with authorizing provider's specialty    Patient had office visit in the last 6 months or has a visit in the next 30 days with authorizing provider.  See \"Patient Info\" tab in inbasket, or \"Choose Columns\" in Meds & Orders section of the refill encounter.              "

## 2018-01-22 NOTE — TELEPHONE ENCOUNTER
Prescription approved per [facility].  Myra Zavala, RN, BSN  [facility]

## 2018-03-05 ENCOUNTER — TRANSFERRED RECORDS (OUTPATIENT)
Dept: HEALTH INFORMATION MANAGEMENT | Facility: CLINIC | Age: 73
End: 2018-03-05

## 2018-04-12 ENCOUNTER — OFFICE VISIT (OUTPATIENT)
Dept: FAMILY MEDICINE | Facility: CLINIC | Age: 73
End: 2018-04-12
Payer: COMMERCIAL

## 2018-04-12 VITALS
DIASTOLIC BLOOD PRESSURE: 66 MMHG | HEIGHT: 71 IN | TEMPERATURE: 97.5 F | HEART RATE: 99 BPM | BODY MASS INDEX: 25.48 KG/M2 | SYSTOLIC BLOOD PRESSURE: 112 MMHG | OXYGEN SATURATION: 94 % | WEIGHT: 182 LBS

## 2018-04-12 DIAGNOSIS — E11.9 TYPE 2 DIABETES MELLITUS WITHOUT COMPLICATION, WITHOUT LONG-TERM CURRENT USE OF INSULIN (H): Primary | ICD-10-CM

## 2018-04-12 LAB
ANION GAP SERPL CALCULATED.3IONS-SCNC: 9 MMOL/L (ref 3–14)
BUN SERPL-MCNC: 21 MG/DL (ref 7–30)
CALCIUM SERPL-MCNC: 9.8 MG/DL (ref 8.5–10.1)
CHLORIDE SERPL-SCNC: 99 MMOL/L (ref 94–109)
CO2 SERPL-SCNC: 29 MMOL/L (ref 20–32)
CREAT SERPL-MCNC: 1.08 MG/DL (ref 0.66–1.25)
GFR SERPL CREATININE-BSD FRML MDRD: 67 ML/MIN/1.7M2
GLUCOSE SERPL-MCNC: 160 MG/DL (ref 70–99)
HBA1C MFR BLD: 8.8 % (ref 0–6.4)
POTASSIUM SERPL-SCNC: 3.7 MMOL/L (ref 3.4–5.3)
SODIUM SERPL-SCNC: 137 MMOL/L (ref 133–144)

## 2018-04-12 PROCEDURE — 99214 OFFICE O/P EST MOD 30 MIN: CPT | Performed by: FAMILY MEDICINE

## 2018-04-12 PROCEDURE — 83036 HEMOGLOBIN GLYCOSYLATED A1C: CPT | Performed by: FAMILY MEDICINE

## 2018-04-12 PROCEDURE — 99207 C FOOT EXAM  NO CHARGE: CPT | Mod: 25 | Performed by: FAMILY MEDICINE

## 2018-04-12 PROCEDURE — 80048 BASIC METABOLIC PNL TOTAL CA: CPT | Performed by: FAMILY MEDICINE

## 2018-04-12 PROCEDURE — 36415 COLL VENOUS BLD VENIPUNCTURE: CPT | Performed by: FAMILY MEDICINE

## 2018-04-12 NOTE — MR AVS SNAPSHOT
After Visit Summary   4/12/2018    Jaylen De La Vega    MRN: [id number]           Patient Information     Date Of Birth          1945        Visit Information        Provider Department      4/12/2018 8:20 AM Jose Roberto Harden, DO [facility]        Today's Diagnoses     Type 2 diabetes mellitus without complication, without long-term current use of insulin (H)    -  1       Follow-ups after your visit        Follow-up notes from your care team     Return in about 6 months (around 10/12/2018) for Diabetes follow-up.      Who to contact     If you have questions or need follow up information about today's clinic visit or your schedule please contact [facility] directly at [phone].  Normal or non-critical lab and imaging results will be communicated to you by "[employer]hart, letter or phone within 4 business days after the clinic has received the results. If you do not hear from us within 7 days, please contact the clinic through "[employer]hart or phone. If you have a critical or abnormal lab result, we will notify you by phone as soon as possible.  Submit refill requests through [employer] or call your pharmacy and they will forward the refill request to us. Please allow 3 business days for your refill to be completed.          Additional Information About Your Visit        MyChart Information     [employer] gives you secure access to your electronic health record. If you see a primary care provider, you can also send messages to your care team and make appointments. If you have questions, please call your primary care clinic.  If you do not have a primary care provider, please call [phone] and they will assist you.        Care EveryWhere ID     This is your Care EveryWhere ID. This could be used by other organizations to access your [place] medical records  [id number]        Your Vitals Were     Pulse Temperature Height Pulse Oximetry BMI (Body Mass Index)       99 97.5  F (36.4  " C) (Oral) 5' 11\" (1.803 m) 94% 25.38 kg/m2        Blood Pressure from Last 3 Encounters:   04/12/18 112/66   11/01/17 122/70   10/17/17 128/76    Weight from Last 3 Encounters:   04/12/18 182 lb (82.6 kg)   11/01/17 176 lb (79.8 kg)   10/17/17 176 lb (79.8 kg)              We Performed the Following     Basic metabolic panel     FOOT EXAM     Hemoglobin A1c        Primary Care Provider Office Phone # Fax #    Tj Verma Jr., [phone] [address] [facility] [address]        Equal Access to Services     [facility] : Hadii roberto carlos vernon hadasho Soomaali, waaxda luqadaha, qaybta kaalmada adeegyada, maddi jean . So [facility] [phone].    ATENCIÓN: Si habla español, tiene a cunningham disposición servicios gratuitos de asistencia lingüística. Llame al [phone].    We comply with applicable federal civil rights laws and [place] laws. We do not discriminate on the basis of race, color, national origin, age, disability, sex, sexual orientation, or gender identity.            Thank you!     Thank you for choosing [facility]  for your care. Our goal is always to provide you with excellent care. Hearing back from our patients is one way we can continue to improve our services. Please take a few minutes to complete the written survey that you may receive in the mail after your visit with us. Thank you!             Your Updated Medication List - Protect others around you: Learn how to safely use, store and throw away your medicines at www.disposemymeds.org.          This list is accurate as of 4/12/18  8:46 AM.  Always use your most recent med list.                   Brand Name Dispense Instructions for use Diagnosis    albuterol 108 (90 Base) MCG/ACT Inhaler    PROAIR HFA/PROVENTIL HFA/VENTOLIN HFA    1 Inhaler    Inhale 2 puffs into the lungs every 6 hours as needed for shortness of breath / dyspnea or wheezing    Chronic obstructive pulmonary disease, " unspecified COPD type (H)       aspirin 81 MG tablet     30 tablet    Take 1 tablet (81 mg) by mouth daily    Type 2 diabetes mellitus without complication, without long-term current use of insulin (H)       blood glucose monitoring test strip    ONETOUCH VERIO IQ    100 each    by In Vitro route 2 times daily    Type 2 diabetes mellitus without complication, without long-term current use of insulin (H)       carisoprodol 350 MG tablet    SOMA    60 tablet    Take 1 tablet (350 mg) by mouth 3 times daily as needed for muscle spasms    Spasm of back muscles       cetirizine 10 MG tablet    zyrTEC    30 tablet    Take 1 tablet (10 mg) by mouth every evening    Post-nasal drip       CO Q 10 PO           fluticasone 50 MCG/ACT spray    FLONASE    1 Bottle    Spray 1-2 sprays into both nostrils daily    Seasonal allergic rhinitis       losartan-hydrochlorothiazide 100-25 MG per tablet    HYZAAR    90 tablet    Take 1 tablet by mouth daily    Essential hypertension       metFORMIN 500 MG tablet    GLUCOPHAGE    180 tablet    Take 1 tablet (500 mg) by mouth 2 times daily (with meals)    Type 2 diabetes mellitus without complication, without long-term current use of insulin (H)       nitroGLYcerin 0.4 MG sublingual tablet    NITROSTAT    25 tablet    Place 1 tablet under the tongue every 5 minutes as needed for chest pain.    Chest pain       ONETOUCH DELICA LANCETS 33G Misc     100 each    1 each 2 times daily    Type 2 diabetes mellitus without complication, without long-term current use of insulin (H)       simvastatin 20 MG tablet    ZOCOR    90 tablet    Take 1 tablet (20 mg) by mouth At Bedtime at bedtime.    Mixed hyperlipidemia       VITAMIN D PO      1 tablet qd

## 2018-04-12 NOTE — NURSING NOTE
"Chief Complaint   Patient presents with     Diabetes       Initial /66  Pulse 99  Temp 97.5  F (36.4  C) (Oral)  Ht 5' 11\" (1.803 m)  Wt 182 lb (82.6 kg)  SpO2 94%  BMI 25.38 kg/m2 Estimated body mass index is 25.38 kg/(m^2) as calculated from the following:    Height as of this encounter: 5' 11\" (1.803 m).    Weight as of this encounter: 182 lb (82.6 kg).  Medication Reconciliation: complete   Linda Wilcox Certified Medical Assistant    "
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
## 2018-04-23 ENCOUNTER — TELEPHONE (OUTPATIENT)
Dept: FAMILY MEDICINE | Facility: CLINIC | Age: 73
End: 2018-04-23

## 2018-04-23 DIAGNOSIS — E11.9 TYPE 2 DIABETES MELLITUS WITHOUT COMPLICATION, WITHOUT LONG-TERM CURRENT USE OF INSULIN (H): ICD-10-CM

## 2018-04-23 NOTE — TELEPHONE ENCOUNTER
Reason for Call:  Request for results:    Name of test or procedure: Labs     Date of test of procedure: 4/12/18    Location of the test or procedure: [facility]     OK to leave the result message on voice mail or with a family member? YES    Phone number Patient can be reached at:  Home number on file [phone] (home)    Additional comments: Patient is concerned about his A1C levels as he said they are higher than they have ever been. He wants to know if it would be beneficial to make a follow up appointment to discuss treatment options.     Call taken on 4/23/2018 at 11:14 AM by Kristen Miguel

## 2018-04-23 NOTE — TELEPHONE ENCOUNTER
Called pt and spoke with him.  He would like to know if he is okay to wait until his PCP is in office next week.  He states his readings have been around 190-200 and that is higher than he is used to.  He also states he has not been very active with the weather we have had so will be better about that now and also reports he is very new to his dx and is not sure what is urgent and what is not.  Can he wait til next Thursday to be seen?  Please route back to TC to call and make appt.  Pt states it is okay for me to leave him a message with an appt time and if it does not work he will call back to change the appt.  Aware MD TARUN does not have anything until next Thursday 5/3/2018  Shagufta Wilson

## 2018-04-23 NOTE — TELEPHONE ENCOUNTER
Last lab completed 4/12/18 advised to follow up in 4 wks OK to be seen around May 12th. OK to see any provider if Dr Verma is not in. Chloé Clark CMA

## 2018-04-23 NOTE — TELEPHONE ENCOUNTER
Per [employer] message that patient reviewed on 4/21/18, he was advised to make a follow up appointment in 4 weeks. Please call patient and assist with this. Thank you.  Myra Zavala RN, BSN  [facility]

## 2018-04-23 NOTE — TELEPHONE ENCOUNTER
Results for DONNA CLEVELAND (MRN [id number]) as of 4/23/2018 12:12   Ref. Range 10/14/2017 08:22 10/14/2017 08:27 3/5/2018 00:00 4/12/2018 08:50   Hemoglobin A1C Latest Ref Range: 0 - 6.4 % 7.3 (H)   8.8 (H)       A1C has elevated from 10/14/17 result as above. I left Nik a voice message and encouraged him to keep upcoming appointment. I informed him I will have Dr. Verma take a look at this and we will call him back if there is anything or changes in his plan of care. I did let him know Dr. Verma is not in the office this week and he will look at this but he may not hear back from us for up to a few days.     Dr. Verma--Nik is reporting as below his sugars running 190-200. Please advise. Thank you, Kathleen Faust R.N.

## 2018-04-24 NOTE — TELEPHONE ENCOUNTER
Please have Jaylen increase his metformin to 1000 mg (two tablets) twice daily and then I should see him in 2-3 weeks.  I've sent a refill in to his pharmacy for him.    Chart reviewed.  Rx sent to pt's preferred pharmacy.       [facility] [place]  [facility] [address] [place] [facility] [address] [facility] [address]      Tj Verma MD

## 2018-04-24 NOTE — TELEPHONE ENCOUNTER
Spoke with patient and advised of MD TARUN's message below. Patient verbalized understanding and agrees with plan.    Will call back if further questions or concerns.    yMra Zavala, RN, BSN

## 2018-05-04 ENCOUNTER — OFFICE VISIT (OUTPATIENT)
Dept: FAMILY MEDICINE | Facility: CLINIC | Age: 73
End: 2018-05-04
Payer: COMMERCIAL

## 2018-05-04 VITALS
BODY MASS INDEX: 24.84 KG/M2 | WEIGHT: 177.4 LBS | SYSTOLIC BLOOD PRESSURE: 108 MMHG | HEIGHT: 71 IN | TEMPERATURE: 98.2 F | DIASTOLIC BLOOD PRESSURE: 62 MMHG | OXYGEN SATURATION: 96 % | HEART RATE: 63 BPM

## 2018-05-04 DIAGNOSIS — E11.9 TYPE 2 DIABETES MELLITUS WITHOUT COMPLICATION, WITHOUT LONG-TERM CURRENT USE OF INSULIN (H): Primary | ICD-10-CM

## 2018-05-04 DIAGNOSIS — I10 ESSENTIAL HYPERTENSION: ICD-10-CM

## 2018-05-04 DIAGNOSIS — M25.572 PAIN IN JOINT INVOLVING ANKLE AND FOOT, LEFT: ICD-10-CM

## 2018-05-04 PROCEDURE — 99214 OFFICE O/P EST MOD 30 MIN: CPT | Performed by: FAMILY MEDICINE

## 2018-05-04 RX ORDER — LOSARTAN POTASSIUM AND HYDROCHLOROTHIAZIDE 25; 100 MG/1; MG/1
1 TABLET ORAL DAILY
Qty: 90 TABLET | Refills: 3 | Status: SHIPPED | OUTPATIENT
Start: 2018-05-04 | End: 2019-05-12

## 2018-05-04 NOTE — PROGRESS NOTES
SUBJECTIVE:   Jaylen De La Vega is a 72 year old male who presents to clinic today for the following health issues:    Diabetes Follow-up    Patient is checking blood sugars: three times daily.   Blood sugar testing frequency justification: Uncontrolled diabetes  Results are as follows:         am - 140 - 7 day average 131     Patient feels like results are higher in the morning          lunchtime - 102,112         bedtime - 125,130    Diabetic concerns: other - elevated blood sugars in the am and Last A1C Result      Symptoms of hypoglycemia (low blood sugar): none     Paresthesias (numbness or burning in feet) or sores: No     Date of last diabetic eye exam: 03/05/18    BP Readings from Last 2 Encounters:   05/04/18 108/62   04/12/18 112/66     Hemoglobin A1C (%)   Date Value   04/12/2018 8.8 (H)   10/14/2017 7.3 (H)     LDL Cholesterol Calculated (mg/dL)   Date Value   10/14/2017 78   06/25/2016 76       Amount of exercise or physical activity: Gardening and walking [id number] steps     Problems taking medications regularly: No    Medication side effects: feels like having side effects from doubling metformin dose on 04/23/18    Diarrhea, nausea and fatigue     Diet: low salt and diabetic        Left ankle pain- Pt has hx of tendonitis in left ankle that has been present for 5 years. Patient was treated X5 years ago for this and had PT. He only did PT for one session, which helped but his pain has now redeveloped. Pt would like referral for this.     Diabetes- Pt has lost weight deliberately. His sugars have improved since doubling his dose 11 days ago. Pt has also been more active. He walked 19,000 steps yesterday, and 15,000 steps the day before. Pt had diarrhea, nausea, and fatigue the first 4-5 days being on Metformin. His average blood sugars were 198 before doubling his dose, and is now 131 with doubled Metformin dose.        Problem list and histories reviewed & adjusted, as indicated.  Additional  "history: as documented    Reviewed and updated as needed this visit by clinical staff  Tobacco  Allergies  Meds  Med Hx  Surg Hx  Fam Hx  Soc Hx      Reviewed and updated as needed this visit by Provider       ROS:  Constitutional, HEENT, cardiovascular, pulmonary, gi and gu systems are negative, except as otherwise noted.    This document serves as a record of the services and decisions personally performed and made by Tj Verma MD. It was created on his behalf by Kalpesh Arango, a trained medical scribe. The creation of this document is based on the provider's statements to the medical scribe.  Kalpesh Arango 9:59 AM May 4, 2018    OBJECTIVE:     /62  Pulse 63  Temp 98.2  F (36.8  C) (Oral)  Ht 1.803 m (5' 11\")  Wt 80.5 kg (177 lb 6.4 oz)  SpO2 96%  BMI 24.74 kg/m2  Body mass index is 24.74 kg/(m^2).  GENERAL: healthy, alert and no distress    Diagnostic Test Results:  No results found for this or any previous visit (from the past 24 hour(s)).    ASSESSMENT/PLAN:     (E11.9) Type 2 diabetes mellitus without complication, without long-term current use of insulin (H)  (primary encounter diagnosis)  Comment: His blood sugars have been improving since doubling his Metformin dose to 2 tablets (1,000 mg) BID 11 days ago. Recommended we have him continue with this regimen; refill given today. Advised pt to follow up if his blood sugars are dropping below 80 so that we can decrease his Metformin as needed to reduce risks of hypoglycemia.  Plan: metFORMIN (GLUCOPHAGE) 500 MG tablet, **A1C         FUTURE 3mo        Will have pt follow up in 3 months for lab only appointment to recheck his A1C. If this has worsened, will have him make an appointment shortly after with me to discuss adjusting his medications. However, if his A1C has improved or remains stable, will plan on having pt follow up with me in 6 months from today for diabetes recheck.    (M25.572) Pain in joint involving ankle and foot, " left  Comment: Recommended we send him to Sports Medicine at [facility] either with Dr. Do or Dr. Guzman, or podiatry here with Dr. Elkins. After discussion, pt wants to see Dr. Elkins given his left ankle has persisted for the past 5 years.  Plan: ORTHO  REFERRAL        Advised pt that he can try following up with the  today to see if he can schedule an appointment to see Dr. Elkins.    (I10) Essential hypertension  Comment: His BP was good today (108/62); will refill his losartan-HCTZ and have him continue as prescribed.  Plan: losartan-hydrochlorothiazide (HYZAAR) 100-25 MG        per tablet        Follow up if needed.    The information in this document, created by the medical scribe for me, accurately reflects the services I personally performed and the decisions made by me. I have reviewed and approved this document for accuracy prior to leaving the patient care area.  May 4, 2018 9:59 AM    Tj Verma Jr, MD  [facility]

## 2018-05-04 NOTE — MR AVS SNAPSHOT
After Visit Summary   5/4/2018    Jaylen De La Vega    MRN: [id number]           Patient Information     Date Of Birth          1945        Visit Information        Provider Department      5/4/2018 9:20 AM Tj Verma Jr., MD [facility]        Today's Diagnoses     Type 2 diabetes mellitus without complication, without long-term current use of insulin (H)    -  1    Pain in joint involving ankle and foot, left        Essential hypertension           Follow-ups after your visit        Additional Services     ORTHO  REFERRAL       [facility] is referring you to the Orthopedic  Services at [facility].       The  Representative will assist you in the coordination of your Orthopedic and Musculoskeletal Care as prescribed by your physician.    The  Representative will call you within 1 business day to help schedule your appointment, or you may contact the [facility] Representative at:    All areas ~ [phone]     Type of Referral : [facility]       Timeframe requested: 3 - 5 days - prefer Dr. Norma Elkins at [place] on a Monday morning    Coverage of these services is subject to the terms and limitations of your health insurance plan.  Please call member services at your health plan with any benefit or coverage questions.      If X-rays, CT or MRI's have been performed, please contact the facility where they were done to arrange for , prior to your scheduled appointment.  Please bring this referral request to your appointment and present it to your specialist.                  Follow-up notes from your care team     Return in about 6 months (around 11/4/2018) for Diabetes Recheck.      Future tests that were ordered for you today     Open Future Orders        Priority Expected Expires Ordered    **A1C FUTURE 3mo Routine 8/2/2018 9/1/2018 5/4/2018            Who to  "contact     If you have questions or need follow up information about today's clinic visit or your schedule please contact [facility] [place] directly at [phone].  Normal or non-critical lab and imaging results will be communicated to you by MyChart, letter or phone within 4 business days after the clinic has received the results. If you do not hear from us within 7 days, please contact the clinic through MyChart or phone. If you have a critical or abnormal lab result, we will notify you by phone as soon as possible.  Submit refill requests through [employer] or call your pharmacy and they will forward the refill request to us. Please allow 3 business days for your refill to be completed.          Additional Information About Your Visit        [employer] Information     [employer] gives you secure access to your electronic health record. If you see a primary care provider, you can also send messages to your care team and make appointments. If you have questions, please call your primary care clinic.  If you do not have a primary care provider, please call [phone] and they will assist you.        Care EveryWhere ID     This is your Care EveryWhere ID. This could be used by other organizations to access your [place] medical records  [id number]        Your Vitals Were     Pulse Temperature Height Pulse Oximetry BMI (Body Mass Index)       63 98.2  F (36.8  C) (Oral) 5' 11\" (1.803 m) 96% 24.74 kg/m2        Blood Pressure from Last 3 Encounters:   05/04/18 108/62   04/12/18 112/66   11/01/17 122/70    Weight from Last 3 Encounters:   05/04/18 177 lb 6.4 oz (80.5 kg)   04/12/18 182 lb (82.6 kg)   11/01/17 176 lb (79.8 kg)              We Performed the Following     ORTHO  REFERRAL          Where to get your medicines      These medications were sent to [facility] [id number] [facility] - [place] - [address]  [address] [place] [address]     Phone:  [phone]     losartan-hydrochlorothiazide " 100-25 MG per tablet    metFORMIN 500 MG tablet          Primary Care Provider Office Phone # Fax #    Tj Verma Jr., [phone] [address]  [place] [address]        Equal Access to Services     DALIA LIZAMA : Hadii aad ku hadfannieo Soomaali, waaxda luqadaha, qaybta kaalmada adeegyada, maddi orozcon haylee morenobarron olmstead. So [facility] [phone].    ATENCIÓN: Si habla español, tiene a cunningham disposición servicios gratuitos de asistencia lingüística. Llame al [phone].    We comply with applicable federal civil rights laws and [place] laws. We do not discriminate on the basis of race, color, national origin, age, disability, sex, sexual orientation, or gender identity.            Thank you!     Thank you for choosing [facility]  for your care. Our goal is always to provide you with excellent care. Hearing back from our patients is one way we can continue to improve our services. Please take a few minutes to complete the written survey that you may receive in the mail after your visit with us. Thank you!             Your Updated Medication List - Protect others around you: Learn how to safely use, store and throw away your medicines at www.disposemymeds.org.          This list is accurate as of 5/4/18 10:07 AM.  Always use your most recent med list.                   Brand Name Dispense Instructions for use Diagnosis    albuterol 108 (90 Base) MCG/ACT Inhaler    PROAIR HFA/PROVENTIL HFA/VENTOLIN HFA    1 Inhaler    Inhale 2 puffs into the lungs every 6 hours as needed for shortness of breath / dyspnea or wheezing    Chronic obstructive pulmonary disease, unspecified COPD type (H)       aspirin 81 MG tablet     30 tablet    Take 1 tablet (81 mg) by mouth daily    Type 2 diabetes mellitus without complication, without long-term current use of insulin (H)       blood glucose monitoring test strip    ONETOUCH VERIO IQ    100 each    by In Vitro route 2 times daily     Type 2 diabetes mellitus without complication, without long-term current use of insulin (H)       carisoprodol 350 MG tablet    SOMA    60 tablet    Take 1 tablet (350 mg) by mouth 3 times daily as needed for muscle spasms    Spasm of back muscles       cetirizine 10 MG tablet    zyrTEC    30 tablet    Take 1 tablet (10 mg) by mouth every evening    Post-nasal drip       CO Q 10 PO           fluticasone 50 MCG/ACT spray    FLONASE    1 Bottle    Spray 1-2 sprays into both nostrils daily    Seasonal allergic rhinitis       losartan-hydrochlorothiazide 100-25 MG per tablet    HYZAAR    90 tablet    Take 1 tablet by mouth daily    Essential hypertension       metFORMIN 500 MG tablet    GLUCOPHAGE    360 tablet    Take 2 tablets (1,000 mg) by mouth 2 times daily (with meals)    Type 2 diabetes mellitus without complication, without long-term current use of insulin (H)       nitroGLYcerin 0.4 MG sublingual tablet    NITROSTAT    25 tablet    Place 1 tablet under the tongue every 5 minutes as needed for chest pain.    Chest pain       ONETOUCH DELICA LANCETS 33G Misc     100 each    1 each 2 times daily    Type 2 diabetes mellitus without complication, without long-term current use of insulin (H)       simvastatin 20 MG tablet    ZOCOR    90 tablet    Take 1 tablet (20 mg) by mouth At Bedtime at bedtime.    Mixed hyperlipidemia       VITAMIN D PO      1 tablet qd

## 2018-05-14 ENCOUNTER — OFFICE VISIT (OUTPATIENT)
Dept: PODIATRY | Facility: CLINIC | Age: 73
End: 2018-05-14
Payer: COMMERCIAL

## 2018-05-14 VITALS
BODY MASS INDEX: 24.78 KG/M2 | SYSTOLIC BLOOD PRESSURE: 118 MMHG | HEIGHT: 71 IN | WEIGHT: 177 LBS | DIASTOLIC BLOOD PRESSURE: 78 MMHG

## 2018-05-14 DIAGNOSIS — M25.572 CHRONIC PAIN OF LEFT ANKLE: Primary | ICD-10-CM

## 2018-05-14 DIAGNOSIS — Q66.70 PES CAVUS: ICD-10-CM

## 2018-05-14 DIAGNOSIS — M76.72 PERONEAL TENDINITIS OF LEFT LOWER EXTREMITY: ICD-10-CM

## 2018-05-14 DIAGNOSIS — G89.29 CHRONIC PAIN OF LEFT ANKLE: Primary | ICD-10-CM

## 2018-05-14 PROCEDURE — 99203 OFFICE O/P NEW LOW 30 MIN: CPT | Performed by: PODIATRIST
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
## 2018-05-14 NOTE — MR AVS SNAPSHOT
After Visit Summary   5/14/2018    Jaylen De La Vega    MRN: [id number]           Patient Information     Date Of Birth          1945        Visit Information        Provider Department      5/14/2018 9:15 AM Ngoc Elkins DPM, Podiatry/Foot and Ankle Surgery [facility]        Care Instructions    Thank you for choosing [facility]!    DR. ELKINS'S CLINIC SCHEDULE  MONDAY AM - ECE TUESDAY - [place]   [address] [address] [address] [place] [address] [place] [address]   [phone] / [phone] / [phone]       WEDNESDAY - [place] FRIDAY AM - [facility]   [address]   [place] [address] [place] [address]   [phone] / [phone]       FRIDAY PM - [place] SCHEDULE SURGERY: [phone]   [address] BILLING QUESTIONS: [phone]   [place] [address] AFTER HOURS: [phone] / [phone] APPOINTMENTS: [phone]       Please call to schedule your MRI/CT/Ultrasound/Arthrogram appointment.  The number is [phone].    For EMG (Motor Nerve Testing):  Please call [facility]:  [phone], option #2      TENDONITIS   Tendons are the strong fibrous portions ofmuscles that attach to bones and allow the muscle to move a joint when it contracts. Tendons are very strong because they have a lot of force exerted on them. Sometimes tendons can become painful because they have suffered an acute injury, in which too much force was exerted at one time, or an overuse injury, in which a normal force was exerted too frequently or over a prolonged period of time. As a result, there is damage to the tendon and its surrounding soft tissue structures and they become inflammed. Because tendons do not have a great blood supply, they do not heal rapidly and the inflammation can become chronic.   Conservative treatment  for tendinitis involves rest and anti-inflammatory measures. Ice is applied 15 minutes 2-3 times daily. Anti-inflammatory medications called NSAIDs (ibuprofen, example) can be taken provided they are used with caution, as they can lead to internal bleeding and increase the risk ofstroke and heart attack. Sometimes topical nitroglycerin is prescribed to help with pain. Often your doctor will use a special shoe or removable walking cast to immobilize the tendon, allowing it to heal without further damage from use. These devices are very useful in helping tendons heal, but they may slow you down or make you feel like your hip, knee, or back are out ofalignment. This is temporary and should go away once you are out ofthe immobilization. You should not use a walking cast when showering or driving. Another option is Platelet Rich Plasma injections. (Normally done with a Sports and Orthorapedic doctor.   If conservative measures fail, your physician may need to surgically repair the tendon by removing any chronic inflammatory tissue and sewing it back together. Sometimes it is sewn to an adjacent tendon with similar function for support and sometimes it is lengthened. . Sometimes the bones around the tendon need to be realigned or reshaped to better support the tendon or prevent further damage. Your foot and ankle surgeon will discuss the specifics of your surgery with you, should you need it.    Towel stretch: Sit on a hard surface with your injured leg stretched out in front of you. Loop a towel around your toes and the ball of your foot and pull the towel toward your body keeping your leg straight. Hold this position for 15 to 30 seconds and then relax. Repeat 3 times. Then push the towel away with the ball of your foot. Repeat 3 times.  When you don't feel much of a stretch using the towel, you can start the standing calf stretch and the following exercises.  Standing calf stretch: Stand facing a wall with your hands  on the wall at about eye level. Keep your injured leg back with your heel on the floor. Keep the other leg forward with the knee bent. Turn your back foot slightly inward (as if you were pigeon-toed). Slowly lean into the wall until you feel a stretch in the back of your calf. Hold the stretch for 15 to 30 seconds. Return to the starting position. Repeat 3 times. Do this exercise several times each day.   Standing soleus stretch: Stand facing a wall with your hands on the wall at about chest height. Keep your injured leg back with your heel on the floor. Keep the other leg forward with the knee bent. Turn your back foot slightly inward (as if you were pigeon-toed). Bend your back knee slightly and gently lean into the wall until you feel a stretch in the lower calf of your injured leg. Hold the stretch for 15 to 30 seconds. Return to the starting position. Repeat 3 times.   Achilles stretch: Stand with the ball of one foot on a stair. Reach for the step below with your heel until you feel a stretch in the arch of your foot. Hold this position for 15 to 30 seconds and then relax. Repeat 3 times.   Heel raise: Balance yourself while standing behind a chair or counter. Using the chair or counter as a support to help you, raise your body up onto your toes and hold for 5 seconds. Then slowly lower yourself down without holding onto the support. (It's OK to keep holding onto the support if you need to.) When this exercise becomes less painful, try lowering yourself down on the injured leg only. Repeat 15 times. Do 2 sets of 15. Rest 30 seconds between sets.   Step-up: Stand with the foot of your injured leg on a support 3 to 5 inches high (like a small step or block of wood). Keep your other foot flat on the floor. Shift your weight onto the injured leg on the support. Straighten your injured leg as the other leg comes off the floor. Return to the starting position by bending your injured leg and slowly lowering your  uninjured leg back to the floor. Do 2 sets of 15.   Resisted ankle eversion: Sit with both legs stretched out in front of you, with your feet about a shoulder's width apart. Tie a loop in one end of elastic tubing. Put the foot of your injured leg through the loop so that the tubing goes around the arch of that foot and wraps around the outside of the other foot. Hold onto the other end of the tubing with your hand to provide tension. Turn the foot of your injured leg up and out. Make sure you keep your other foot still so that it will allow the tubing to stretch as you move the foot of your injured leg. Return to the starting position. Do 2 sets of 15.   Balance and reach exercises: Stand next to a chair with your injured leg farther from the chair. The chair will provide support if you need it. Stand on the foot of your injured leg and bend your knee slightly. Try to raise the arch of this foot while keeping your big toe on the floor. Keep your foot in this position. With the hand that is farther away from the chair, reach forward in front of you by bending at the waist. Avoid bending your knee any more as you do this. Repeat this 10 times. To make the exercise more challenging, reach farther in front of you. Do 2 sets of 10.  the same position as above. While keeping your arch height, reach the hand that is farther away from the chair across your body toward the chair. The farther you reach, the more challenging the exercise. Do 2 sets of 10.     Resisted ankle eversion: Sit with both legs stretched out in front of you, with your feet about a shoulder's width apart. Tie a loop in one end of elastic tubing. Put the foot of your injured leg through the loop so that the tubing goes around the arch of that foot and wraps around the outside of the other foot. Hold onto the other end of the tubing with your hand to provide tension. Turn the foot of your injured leg up and out. Make sure you keep your other foot  still so that it will allow the tubing to stretch as you move the foot of your injured leg. Return to the starting position. Do 2 sets of 15.   If you have access to a wobble board, do the following exercises:  Wobble board exercises:   Stand on a wobble board with your feet shoulder width apart. Rock the board forwards and backwards 30 times, then side to side 30 times. Hold on to a chair if you need support.   Rotate the wobble board around so that the edge of the board is in contact with the floor at all times. Do this 30 times in a clockwise and then a counterclockwise direction.   Balance on the wobble board for as long as you can without letting the edges touch the floor. Try to do this for 2 minutes without touching the floor.   Rotate the wobble board in clockwise and counterclockwise circles, but do not let the edge of the board touch the floor.   When you have mastered exercises A through D, try repeating them while standing on just your injured leg.   After you are able to do these exercises on one leg, try to do them with your eyes closed. Make sure you have something nearby to support you in case you lose your balance.      Body Mass Index (BMI)  Many things can cause foot and ankle problems. Foot structure, activity level, foot mechanics and injuries are common causes of pain.  One very important issue that often goes unmentioned, is body weight. Extra weight can cause increased stress on muscles, ligaments, bones and tendons.  Sometimes just a few extra pounds is all it takes to put one over her/his threshold. Without reducing that stress, it can be difficult to alleviate pain. Some people are uncomfortable addressing this issue, but we feel it is important for you to think about it. As Foot &  Ankle specialists, our job is addressing the lower extremity problem and possible causes. Regarding extra body weight, we encourage patients to discuss diet and weight management plans with their primary care  doctors. It is this team approach that gives you the best opportunity for pain relief and getting you back on your feet.                  Follow-ups after your visit        Your next 10 appointments already scheduled     Aug 08, 2018  1:30 PM CDT   LAB with [facility]   [facility]    [address]  [facility] [address] [phone]           Please do not eat 10-12 hours before your appointment if you are coming in fasting for labs on lipids, cholesterol, or glucose (sugar). This does not apply to pregnant women. Water, hot tea and black coffee (with nothing added) are okay. Do not drink other fluids, diet soda or chew gum.            Nov 07, 2018  1:20 PM CST   SHORT with Tj Verma Jr., MD   [facility] ([facility]    [address] [facility] [id number] [phone]              Who to contact     If you have questions or need follow up information about today's clinic visit or your schedule please contact [facility] [place] directly at [phone].  Normal or non-critical lab and imaging results will be communicated to you by Arrayenthart, letter or phone within 4 business days after the clinic has received the results. If you do not hear from us within 7 days, please contact the clinic through WalkMet or phone. If you have a critical or abnormal lab result, we will notify you by phone as soon as possible.  Submit refill requests through Taggable or call your pharmacy and they will forward the refill request to us. Please allow 3 business days for your refill to be completed.          Additional Information About Your Visit        Taggable Information     Taggable gives you secure access to your electronic health record. If you see a primary care provider, you can also send messages to your care team and make appointments. If you have questions, please call your primary care clinic.  If you do not have a primary care provider, please  "call [phone] and they will assist you.        Care EveryWhere ID     This is your Care EveryWhere ID. This could be used by other organizations to access your [facility] medical records  [id number]        Your Vitals Were     Height BMI (Body Mass Index)                5' 11\" (1.803 m) 24.69 kg/m2           Blood Pressure from Last 3 Encounters:   05/14/18 118/78   05/04/18 108/62   04/12/18 112/66    Weight from Last 3 Encounters:   05/14/18 177 lb (80.3 kg)   05/04/18 177 lb 6.4 oz (80.5 kg)   04/12/18 182 lb (82.6 kg)              Today, you had the following     No orders found for display       Primary Care Provider Office Phone # Fax #    Tj Verma Jr., [phone] [address]  [place] [address]        Equal Access to Services     SUGEY LIZAMA : Hadii roberto carlos vernon hadasho Socatalnia, waaxda luqadaha, qaybta kaalmada adeegyada, maddi jean . So [facility] [phone].    ATENCIÓN: Si albala español, tiene a cunningham disposición servicios gratuitos de asistencia lingüística. Llame al [phone].    We comply with applicable federal civil rights laws and [place] laws. We do not discriminate on the basis of race, color, national origin, age, disability, sex, sexual orientation, or gender identity.            Thank you!     Thank you for choosing [facility]  for your care. Our goal is always to provide you with excellent care. Hearing back from our patients is one way we can continue to improve our services. Please take a few minutes to complete the written survey that you may receive in the mail after your visit with us. Thank you!             Your Updated Medication List - Protect others around you: Learn how to safely use, store and throw away your medicines at www.disposemymeds.org.          This list is accurate as of 5/14/18  9:34 AM.  Always use your most recent med list.                   Brand Name Dispense Instructions for use Diagnosis    " albuterol 108 (90 Base) MCG/ACT Inhaler    PROAIR HFA/PROVENTIL HFA/VENTOLIN HFA    1 Inhaler    Inhale 2 puffs into the lungs every 6 hours as needed for shortness of breath / dyspnea or wheezing    Chronic obstructive pulmonary disease, unspecified COPD type (H)       aspirin 81 MG tablet     30 tablet    Take 1 tablet (81 mg) by mouth daily    Type 2 diabetes mellitus without complication, without long-term current use of insulin (H)       blood glucose monitoring test strip    ONETOUCH VERIO IQ    100 each    by In Vitro route 2 times daily    Type 2 diabetes mellitus without complication, without long-term current use of insulin (H)       carisoprodol 350 MG tablet    SOMA    60 tablet    Take 1 tablet (350 mg) by mouth 3 times daily as needed for muscle spasms    Spasm of back muscles       cetirizine 10 MG tablet    zyrTEC    30 tablet    Take 1 tablet (10 mg) by mouth every evening    Post-nasal drip       CO Q 10 PO           fluticasone 50 MCG/ACT spray    FLONASE    1 Bottle    Spray 1-2 sprays into both nostrils daily    Seasonal allergic rhinitis       losartan-hydrochlorothiazide 100-25 MG per tablet    HYZAAR    90 tablet    Take 1 tablet by mouth daily    Essential hypertension       metFORMIN 500 MG tablet    GLUCOPHAGE    360 tablet    Take 2 tablets (1,000 mg) by mouth 2 times daily (with meals)    Type 2 diabetes mellitus without complication, without long-term current use of insulin (H)       nitroGLYcerin 0.4 MG sublingual tablet    NITROSTAT    25 tablet    Place 1 tablet under the tongue every 5 minutes as needed for chest pain.    Chest pain       ONETOUCH DELICA LANCETS 33G Misc     100 each    1 each 2 times daily    Type 2 diabetes mellitus without complication, without long-term current use of insulin (H)       simvastatin 20 MG tablet    ZOCOR    90 tablet    Take 1 tablet (20 mg) by mouth At Bedtime at bedtime.    Mixed hyperlipidemia       VITAMIN D PO      1 tablet qd

## 2018-05-14 NOTE — PATIENT INSTRUCTIONS
Thank you for choosing [facility]!    DR. QUICK'S CLINIC SCHEDULE  MONDAY AM - [place] TUESDAY - [place]   [address] [address] [place] [address] [place] [address]   [phone] / [phone] / [phone]       WEDNESDAY - [place] FRIDAY AM - [facility]   [address]   [place] [address] [place] [address]   [phone] / [phone]       FRIDAY PM - [place] SCHEDULE SURGERY: [phone]   [address] BILLING QUESTIONS: [phone]   [place] [address] AFTER HOURS: [phone]   [phone] / [phone] APPOINTMENTS: [phone]       Please call to schedule your MRI/CT/Ultrasound/Arthrogram appointment.  The number is [phone].    For EMG (Motor Nerve Testing):  Please call [facility]:  [phone], option #2      TENDONITIS   Tendons are the strong fibrous portions ofmuscles that attach to bones and allow the muscle to move a joint when it contracts. Tendons are very strong because they have a lot of force exerted on them. Sometimes tendons can become painful because they have suffered an acute injury, in which too much force was exerted at one time, or an overuse injury, in which a normal force was exerted too frequently or over a prolonged period of time. As a result, there is damage to the tendon and its surrounding soft tissue structures and they become inflammed. Because tendons do not have a great blood supply, they do not heal rapidly and the inflammation can become chronic.   Conservative treatment for tendinitis involves rest and anti-inflammatory measures. Ice is applied 15 minutes 2-3 times daily. Anti-inflammatory medications called NSAIDs (ibuprofen, example) can be taken provided they are used with caution, as they can lead to internal bleeding and increase the risk ofstroke and heart attack. Sometimes topical  nitroglycerin is prescribed to help with pain. Often your doctor will use a special shoe or removable walking cast to immobilize the tendon, allowing it to heal without further damage from use. These devices are very useful in helping tendons heal, but they may slow you down or make you feel like your hip, knee, or back are out ofalignment. This is temporary and should go away once you are out ofthe immobilization. You should not use a walking cast when showering or driving. Another option is Platelet Rich Plasma injections. (Normally done with a Sports and Orthorapedic doctor.   If conservative measures fail, your physician may need to surgically repair the tendon by removing any chronic inflammatory tissue and sewing it back together. Sometimes it is sewn to an adjacent tendon with similar function for support and sometimes it is lengthened. . Sometimes the bones around the tendon need to be realigned or reshaped to better support the tendon or prevent further damage. Your foot and ankle surgeon will discuss the specifics of your surgery with you, should you need it.    Towel stretch: Sit on a hard surface with your injured leg stretched out in front of you. Loop a towel around your toes and the ball of your foot and pull the towel toward your body keeping your leg straight. Hold this position for 15 to 30 seconds and then relax. Repeat 3 times. Then push the towel away with the ball of your foot. Repeat 3 times.  When you don't feel much of a stretch using the towel, you can start the standing calf stretch and the following exercises.  Standing calf stretch: Stand facing a wall with your hands on the wall at about eye level. Keep your injured leg back with your heel on the floor. Keep the other leg forward with the knee bent. Turn your back foot slightly inward (as if you were pigeon-toed). Slowly lean into the wall until you feel a stretch in the back of your calf. Hold the stretch for 15 to 30 seconds. Return  to the starting position. Repeat 3 times. Do this exercise several times each day.   Standing soleus stretch: Stand facing a wall with your hands on the wall at about chest height. Keep your injured leg back with your heel on the floor. Keep the other leg forward with the knee bent. Turn your back foot slightly inward (as if you were pigeon-toed). Bend your back knee slightly and gently lean into the wall until you feel a stretch in the lower calf of your injured leg. Hold the stretch for 15 to 30 seconds. Return to the starting position. Repeat 3 times.   Achilles stretch: Stand with the ball of one foot on a stair. Reach for the step below with your heel until you feel a stretch in the arch of your foot. Hold this position for 15 to 30 seconds and then relax. Repeat 3 times.   Heel raise: Balance yourself while standing behind a chair or counter. Using the chair or counter as a support to help you, raise your body up onto your toes and hold for 5 seconds. Then slowly lower yourself down without holding onto the support. (It's OK to keep holding onto the support if you need to.) When this exercise becomes less painful, try lowering yourself down on the injured leg only. Repeat 15 times. Do 2 sets of 15. Rest 30 seconds between sets.   Step-up: Stand with the foot of your injured leg on a support 3 to 5 inches high (like a small step or block of wood). Keep your other foot flat on the floor. Shift your weight onto the injured leg on the support. Straighten your injured leg as the other leg comes off the floor. Return to the starting position by bending your injured leg and slowly lowering your uninjured leg back to the floor. Do 2 sets of 15.   Resisted ankle eversion: Sit with both legs stretched out in front of you, with your feet about a shoulder's width apart. Tie a loop in one end of elastic tubing. Put the foot of your injured leg through the loop so that the tubing goes around the arch of that foot and wraps  around the outside of the other foot. Hold onto the other end of the tubing with your hand to provide tension. Turn the foot of your injured leg up and out. Make sure you keep your other foot still so that it will allow the tubing to stretch as you move the foot of your injured leg. Return to the starting position. Do 2 sets of 15.   Balance and reach exercises: Stand next to a chair with your injured leg farther from the chair. The chair will provide support if you need it. Stand on the foot of your injured leg and bend your knee slightly. Try to raise the arch of this foot while keeping your big toe on the floor. Keep your foot in this position. With the hand that is farther away from the chair, reach forward in front of you by bending at the waist. Avoid bending your knee any more as you do this. Repeat this 10 times. To make the exercise more challenging, reach farther in front of you. Do 2 sets of 10.  the same position as above. While keeping your arch height, reach the hand that is farther away from the chair across your body toward the chair. The farther you reach, the more challenging the exercise. Do 2 sets of 10.     Resisted ankle eversion: Sit with both legs stretched out in front of you, with your feet about a shoulder's width apart. Tie a loop in one end of elastic tubing. Put the foot of your injured leg through the loop so that the tubing goes around the arch of that foot and wraps around the outside of the other foot. Hold onto the other end of the tubing with your hand to provide tension. Turn the foot of your injured leg up and out. Make sure you keep your other foot still so that it will allow the tubing to stretch as you move the foot of your injured leg. Return to the starting position. Do 2 sets of 15.   If you have access to a wobble board, do the following exercises:  Wobble board exercises:   Stand on a wobble board with your feet shoulder width apart. Rock the board forwards and  backwards 30 times, then side to side 30 times. Hold on to a chair if you need support.   Rotate the wobble board around so that the edge of the board is in contact with the floor at all times. Do this 30 times in a clockwise and then a counterclockwise direction.   Balance on the wobble board for as long as you can without letting the edges touch the floor. Try to do this for 2 minutes without touching the floor.   Rotate the wobble board in clockwise and counterclockwise circles, but do not let the edge of the board touch the floor.   When you have mastered exercises A through D, try repeating them while standing on just your injured leg.   After you are able to do these exercises on one leg, try to do them with your eyes closed. Make sure you have something nearby to support you in case you lose your balance.      Body Mass Index (BMI)  Many things can cause foot and ankle problems. Foot structure, activity level, foot mechanics and injuries are common causes of pain.  One very important issue that often goes unmentioned, is body weight. Extra weight can cause increased stress on muscles, ligaments, bones and tendons.  Sometimes just a few extra pounds is all it takes to put one over her/his threshold. Without reducing that stress, it can be difficult to alleviate pain. Some people are uncomfortable addressing this issue, but we feel it is important for you to think about it. As Foot &  Ankle specialists, our job is addressing the lower extremity problem and possible causes. Regarding extra body weight, we encourage patients to discuss diet and weight management plans with their primary care doctors. It is this team approach that gives you the best opportunity for pain relief and getting you back on your feet.
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
## 2018-05-15 ENCOUNTER — HOSPITAL ENCOUNTER (OUTPATIENT)
Dept: MRI IMAGING | Facility: CLINIC | Age: 73
Discharge: HOME | End: 2018-05-15
Attending: PODIATRIST | Admitting: PODIATRIST
Payer: MEDICARE

## 2018-05-15 DIAGNOSIS — M76.72 PERONEAL TENDINITIS OF LEFT LOWER EXTREMITY: ICD-10-CM

## 2018-05-15 DIAGNOSIS — G89.29 CHRONIC PAIN OF LEFT ANKLE: ICD-10-CM

## 2018-05-15 DIAGNOSIS — Q66.70 PES CAVUS: ICD-10-CM

## 2018-05-15 DIAGNOSIS — M25.572 CHRONIC PAIN OF LEFT ANKLE: ICD-10-CM

## 2018-05-15 PROCEDURE — 73721 MRI JNT OF LWR EXTRE W/O DYE: CPT | Mod: LT

## 2018-05-17 DIAGNOSIS — M76.72 PERONEAL TENDINITIS OF LEFT LOWER EXTREMITY: ICD-10-CM

## 2018-05-17 DIAGNOSIS — M25.572 CHRONIC PAIN OF LEFT ANKLE: Primary | ICD-10-CM

## 2018-05-17 DIAGNOSIS — G89.29 CHRONIC PAIN OF LEFT ANKLE: Primary | ICD-10-CM

## 2018-05-17 NOTE — TELEPHONE ENCOUNTER
Order for PT placed. He can call to schedule at: [phone].    Will fax ankle brace order over to [facility] tomorrow morning and he can pick it up there.     Please let patient know.     Thanks,    KIANA Ruggiero Macon M Bohm, Jaclyn J, DPM, Podiatry/Foot and Ankle Surgery                   Spoke with pt, he would like to do the ankle brace and PT.              MR Ankle Left w/o Contrast   Status:  Final result   Visible to patient:  Yes (MyChart) Dx:  Pes cavus; Peroneal tendinitis of lef... Order: [id number]       Notes Recorded by Rodney Carter on 5/16/2018 at 3:06 PM  Spoke with pt, he would like to do the ankle brace and PT.  ------    Notes Recorded by Ngoc Elkins DPM, Podiatry/Foot and Ankle Surgery on 5/16/2018 at 12:23 PM  No tendon tear or stress fracture noted. minimal tendonitis. Recommend physical therapy and ankle brace.     Please let patient know.     Ngoc Elkins DPM

## 2018-05-21 ENCOUNTER — THERAPY VISIT (OUTPATIENT)
Dept: PHYSICAL THERAPY | Facility: CLINIC | Age: 73
End: 2018-05-21
Payer: COMMERCIAL

## 2018-05-21 DIAGNOSIS — M79.672 LEFT FOOT PAIN: Primary | ICD-10-CM

## 2018-05-21 PROCEDURE — 97161 PT EVAL LOW COMPLEX 20 MIN: CPT | Mod: GP | Performed by: PHYSICAL THERAPIST

## 2018-05-21 PROCEDURE — 97530 THERAPEUTIC ACTIVITIES: CPT | Mod: GP | Performed by: PHYSICAL THERAPIST

## 2018-05-21 PROCEDURE — 97110 THERAPEUTIC EXERCISES: CPT | Mod: GP | Performed by: PHYSICAL THERAPIST

## 2018-05-21 NOTE — MR AVS SNAPSHOT
After Visit Summary   5/21/2018    Jaylen De La Vega    MRN: [id number]           Patient Information     Date Of Birth          1945        Visit Information        Provider Department      5/21/2018 12:40 PM Rosio Escamilla PT [facility] [place]        Today's Diagnoses     Left foot pain    -  1       Follow-ups after your visit        Your next 10 appointments already scheduled     May 30, 2018 12:50 PM CDT   MALIA Extremity with Rosio Escamilla PT   [facility] [place] ([facility] [place])    [address] [address]   [phone]            Jun 06, 2018 10:20 AM CDT   MALIA Extremity with Rosio Escamilla PT   [facility] [place] ([facility] [place])    [address]  [place] [address]   [phone]            Aug 08, 2018  1:30 PM CDT   LAB with [facility]   [facility] [place] [facility]    [address]  [place] [address]   [phone]           Please do not eat 10-12 hours before your appointment if you are coming in fasting for labs on lipids, cholesterol, or glucose (sugar). This does not apply to pregnant women. Water, hot tea and black coffee (with nothing added) are okay. Do not drink other fluids, diet soda or chew gum.            Nov 07, 2018  1:20 PM CST   SHORT with Tj Verma Jr., MD   [facility] [place] ([facility])    [address]  [facility] [address]   [phone]              Who to contact     If you have questions or need follow up information about today's clinic visit or your schedule please contact [facility] [place] directly at [phone].  Normal or non-critical lab and imaging results will be communicated to you by MyChart, letter or phone within 4 business days after the clinic has received the results. If you do not hear from us within 7 days, please contact the clinic through MyChart or phone. If you have a critical or abnormal  lab result, we will notify you by phone as soon as possible.  Submit refill requests through Meiyou or call your pharmacy and they will forward the refill request to us. Please allow 3 business days for your refill to be completed.          Additional Information About Your Visit        GoCrossCampushart Information     Meiyou gives you secure access to your electronic health record. If you see a primary care provider, you can also send messages to your care team and make appointments. If you have questions, please call your primary care clinic.  If you do not have a primary care provider, please call [phone] and they will assist you.        Care EveryWhere ID     This is your Care EveryWhere ID. This could be used by other organizations to access your [facility] medical records  [id number]         Blood Pressure from Last 3 Encounters:   05/14/18 118/78   05/04/18 108/62   04/12/18 112/66    Weight from Last 3 Encounters:   05/14/18 80.3 kg (177 lb)   05/04/18 80.5 kg (177 lb 6.4 oz)   04/12/18 82.6 kg (182 lb)              We Performed the Following     HC PT EVAL, LOW COMPLEXITY     MALIA INITIAL EVAL REPORT     THERAPEUTIC ACTIVITIES     THERAPEUTIC EXERCISES        Primary Care Provider Office Phone # Fax #    Tj Verma Jr., [phone] [address]  [place] [address]        Equal Access to Services     [facility] : Hadii aad ku hadasho Soomaali, waaxda luqadaha, qaybta kaalmada adeegyada, maddi jean . So [facility] [phone].    ATENCIÓN: Si habla español, tiene a cunningham disposición servicios gratuitos de asistencia lingüística. Linda al [phone].    We comply with applicable federal civil rights laws and [place] laws. We do not discriminate on the basis of race, color, national origin, age, disability, sex, sexual orientation, or gender identity.            Thank you!     Thank you for choosing [facility] [place]  for your care.  Our goal is always to provide you with excellent care. Hearing back from our patients is one way we can continue to improve our services. Please take a few minutes to complete the written survey that you may receive in the mail after your visit with us. Thank you!             Your Updated Medication List - Protect others around you: Learn how to safely use, store and throw away your medicines at www.disposemymeds.org.          This list is accurate as of 5/21/18  1:30 PM.  Always use your most recent med list.                   Brand Name Dispense Instructions for use Diagnosis    albuterol 108 (90 Base) MCG/ACT Inhaler    PROAIR HFA/PROVENTIL HFA/VENTOLIN HFA    1 Inhaler    Inhale 2 puffs into the lungs every 6 hours as needed for shortness of breath / dyspnea or wheezing    Chronic obstructive pulmonary disease, unspecified COPD type (H)       aspirin 81 MG tablet     30 tablet    Take 1 tablet (81 mg) by mouth daily    Type 2 diabetes mellitus without complication, without long-term current use of insulin (H)       blood glucose monitoring test strip    ONETOUCH VERIO IQ    100 each    by In Vitro route 2 times daily    Type 2 diabetes mellitus without complication, without long-term current use of insulin (H)       carisoprodol 350 MG tablet    SOMA    60 tablet    Take 1 tablet (350 mg) by mouth 3 times daily as needed for muscle spasms    Spasm of back muscles       cetirizine 10 MG tablet    zyrTEC    30 tablet    Take 1 tablet (10 mg) by mouth every evening    Post-nasal drip       CO Q 10 PO           fluticasone 50 MCG/ACT spray    FLONASE    1 Bottle    Spray 1-2 sprays into both nostrils daily    Seasonal allergic rhinitis       losartan-hydrochlorothiazide 100-25 MG per tablet    HYZAAR    90 tablet    Take 1 tablet by mouth daily    Essential hypertension       metFORMIN 500 MG tablet    GLUCOPHAGE    360 tablet    Take 2 tablets (1,000 mg) by mouth 2 times daily (with meals)    Type 2 diabetes  mellitus without complication, without long-term current use of insulin (H)       nitroGLYcerin 0.4 MG sublingual tablet    NITROSTAT    25 tablet    Place 1 tablet under the tongue every 5 minutes as needed for chest pain.    Chest pain       ONETOUCH DELICA LANCETS 33G Misc     100 each    1 each 2 times daily    Type 2 diabetes mellitus without complication, without long-term current use of insulin (H)       order for DME     1 Device    Equipment being ordered: ankle brace    Chronic pain of left ankle, Peroneal tendinitis of left lower extremity       simvastatin 20 MG tablet    ZOCOR    90 tablet    Take 1 tablet (20 mg) by mouth At Bedtime at bedtime.    Mixed hyperlipidemia       VITAMIN D PO      1 tablet qd

## 2018-05-21 NOTE — PROGRESS NOTES
[facility] Initial Evaluation  Subjective:  Physical Therapy Initial Evaluation   5/21/18   Precautions/Restrictions/MD instructions: PT eval and treat   Therapist Impression:   Pt is a 73 y/o male, with 5 year history of left foot/ankle pain (left peroneal tendonitis). Pt presents with pain, decreased ROM/mobiltiy, poor balance and decreased strength. These impairments limit their ability to walk prolonged distances (pt is avid walker 10,000-20,000) . Skilled PT services necessary in order to reduce impairments and improve independent function.    Subjective:   Chief Complaint: Left ankle/foot pain, 5 years ago tripped on pile of CD's on floor (inversion sprain noted)  DOI/onset: 8/1/14 DOS: none  Location: Left ankle/foot (latearl side)  Quality: dull ache/ at times burning and sharp  Frequency: intermittent  Radiates: can radiate up the lateral side of leg   Pain scale: Rest 0/10 Activity: 7/10    Sleeping: not affected    Exacerbated by: walking, up stairs   Relieved by: brace  Progression: same  Previous Treatment: PT in past  Effect of prior treatment: only one session (then left [facility])    PMH and/or surgical history: oral surgery; R shoulder (scope); R elbow    Imaging: MRI (peroneal reticulum spurring)      Occupation: retired  Job duties/hobbies: walking club   Current HEP/exercise regimen: walking daily  Patient's goals: walk without pain  Medications: HTN, diabetic meds, cholesterol   General health as reported by patient: fair/good  Return to MD: as needed  Red Flags: diabetes (Type ll); smoked 50 years (quit 6 years ago)      HPI                    Objective:  ANKLE:    Standing Posture: pes cavus bilaterally, Left lateral gastroc head tight (balled up)    Gait: Slight ER bilaterally, and lacking push off bilatearlly    SL Balance:   L: 5 sec and painful(R hip drop)  R: 15 sec (L hip drop)    ROM/Strength: (* indicates patient's pain)   AROM L AROM R   Dorsiflexion -4 2    Plantarflexion 45* 65   Inversion (prone) 25 25   Eversion (prone) 10 10       Edema:    General: none to noted     Palpation:     System    Physical Exam    General     ROS    Assessment/Plan:    Patient is a 72 year old male with left side ankle complaints.    Patient has the following significant findings with corresponding treatment plan.                Diagnosis 1:  L foot/ankle pain  Pain -  hot/cold therapy, US, electric stimulation, manual therapy, splint/taping/bracing/orthotics, self management, education and home program  Decreased ROM/flexibility - manual therapy and therapeutic exercise  Decreased joint mobility - manual therapy and therapeutic exercise  Decreased strength - therapeutic exercise and therapeutic activities  Impaired balance - neuro re-education and therapeutic activities  Decreased proprioception - neuro re-education and therapeutic activities  Inflammation - cold therapy and self management/home program  Impaired gait - gait training  Impaired muscle performance - neuro re-education  Decreased function - therapeutic activities  Impaired posture - neuro re-education  Instability -  Therapeutic Activity  Therapeutic Exercise    Therapy Evaluation Codes:   1) History comprised of:   Personal factors that impact the plan of care:      None.    Comorbidity factors that impact the plan of care are:      Diabetes and High blood pressure.     Medications impacting care: High blood pressure and diabetic med and cholesterol.  2) Examination of Body Systems comprised of:   Body structures and functions that impact the plan of care:      Ankle.   Activity limitations that impact the plan of care are:      Stairs and Walking.  3) Clinical presentation characteristics are:   Stable/Uncomplicated.  4) Decision-Making    Low complexity using standardized patient assessment instrument and/or measureable assessment of functional outcome.  Cumulative Therapy Evaluation is: Low complexity.    Previous  and current functional limitations:  (See Goal Flow Sheet for this information)    Short term and Long term goals: (See Goal Flow Sheet for this information)     Communication ability:  Patient appears to be able to clearly communicate and understand verbal and written communication and follow directions correctly.  Treatment Explanation - The following has been discussed with the patient:   RX ordered/plan of care  Anticipated outcomes  Possible risks and side effects  This patient would benefit from PT intervention to resume normal activities.   Rehab potential is good.    Frequency:  1 X week, once daily  Duration:  for 6 weeks  Discharge Plan:  Achieve all LTG.  Independent in home treatment program.  Reach maximal therapeutic benefit.    Please refer to the daily flowsheet for treatment today, total treatment time and time spent performing 1:1 timed codes.

## 2018-05-30 ENCOUNTER — THERAPY VISIT (OUTPATIENT)
Dept: PHYSICAL THERAPY | Facility: CLINIC | Age: 73
End: 2018-05-30
Payer: COMMERCIAL

## 2018-05-30 DIAGNOSIS — M79.672 LEFT FOOT PAIN: ICD-10-CM

## 2018-05-30 PROCEDURE — 97140 MANUAL THERAPY 1/> REGIONS: CPT | Mod: GP | Performed by: PHYSICAL THERAPIST

## 2018-05-30 PROCEDURE — 97112 NEUROMUSCULAR REEDUCATION: CPT | Mod: GP | Performed by: PHYSICAL THERAPIST

## 2018-05-30 PROCEDURE — 97110 THERAPEUTIC EXERCISES: CPT | Mod: GP | Performed by: PHYSICAL THERAPIST

## 2018-06-06 ENCOUNTER — THERAPY VISIT (OUTPATIENT)
Dept: PHYSICAL THERAPY | Facility: CLINIC | Age: 73
End: 2018-06-06
Payer: COMMERCIAL

## 2018-06-06 DIAGNOSIS — M79.672 LEFT FOOT PAIN: ICD-10-CM

## 2018-06-06 PROCEDURE — 97110 THERAPEUTIC EXERCISES: CPT | Mod: GP | Performed by: PHYSICAL THERAPIST

## 2018-06-06 PROCEDURE — 97140 MANUAL THERAPY 1/> REGIONS: CPT | Mod: GP | Performed by: PHYSICAL THERAPIST

## 2018-06-13 ENCOUNTER — THERAPY VISIT (OUTPATIENT)
Dept: PHYSICAL THERAPY | Facility: CLINIC | Age: 73
End: 2018-06-13
Payer: COMMERCIAL

## 2018-06-13 DIAGNOSIS — M79.672 LEFT FOOT PAIN: ICD-10-CM

## 2018-06-13 PROCEDURE — 97140 MANUAL THERAPY 1/> REGIONS: CPT | Mod: GP | Performed by: PHYSICAL THERAPIST

## 2018-06-13 PROCEDURE — 97110 THERAPEUTIC EXERCISES: CPT | Mod: GP | Performed by: PHYSICAL THERAPIST

## 2018-06-27 ENCOUNTER — THERAPY VISIT (OUTPATIENT)
Dept: PHYSICAL THERAPY | Facility: CLINIC | Age: 73
End: 2018-06-27
Payer: COMMERCIAL

## 2018-06-27 DIAGNOSIS — M79.672 LEFT FOOT PAIN: ICD-10-CM

## 2018-06-27 PROCEDURE — 97140 MANUAL THERAPY 1/> REGIONS: CPT | Mod: GP | Performed by: PHYSICAL THERAPIST

## 2018-06-27 PROCEDURE — 97110 THERAPEUTIC EXERCISES: CPT | Mod: GP | Performed by: PHYSICAL THERAPIST

## 2018-07-05 ENCOUNTER — TELEPHONE (OUTPATIENT)
Dept: FAMILY MEDICINE | Facility: CLINIC | Age: 73
End: 2018-07-05

## 2018-07-05 ENCOUNTER — MYC MEDICAL ADVICE (OUTPATIENT)
Dept: FAMILY MEDICINE | Facility: CLINIC | Age: 73
End: 2018-07-05

## 2018-07-05 ENCOUNTER — E-VISIT (OUTPATIENT)
Dept: FAMILY MEDICINE | Facility: CLINIC | Age: 73
End: 2018-07-05
Payer: COMMERCIAL

## 2018-07-05 DIAGNOSIS — E11.9 TYPE 2 DIABETES MELLITUS WITHOUT COMPLICATION, WITHOUT LONG-TERM CURRENT USE OF INSULIN (H): ICD-10-CM

## 2018-07-05 PROCEDURE — 99444 ZZC PHYSICIAN ONLINE EVALUATION & MANAGEMENT SERVICE: CPT | Performed by: FAMILY MEDICINE

## 2018-07-05 RX ORDER — GLIMEPIRIDE 4 MG/1
4 TABLET ORAL
Qty: 90 TABLET | Refills: 1 | Status: SHIPPED | OUTPATIENT
Start: 2018-07-05 | End: 2018-12-13

## 2018-07-05 NOTE — TELEPHONE ENCOUNTER
Message handled by Nurse Triage with [facility] - provider name: SR PAT       Called pt advised pt to decrease his dose.  Advised to start an E Visit with SR PAT so they can discuss med changes.    Barby Voss RN    [facility] Triage  .

## 2018-07-18 ENCOUNTER — THERAPY VISIT (OUTPATIENT)
Dept: PHYSICAL THERAPY | Facility: CLINIC | Age: 73
End: 2018-07-18
Payer: COMMERCIAL

## 2018-07-18 DIAGNOSIS — M79.672 LEFT FOOT PAIN: ICD-10-CM

## 2018-07-18 PROCEDURE — 97140 MANUAL THERAPY 1/> REGIONS: CPT | Mod: GP | Performed by: PHYSICAL THERAPIST

## 2018-07-18 PROCEDURE — G8978 MOBILITY CURRENT STATUS: HCPCS | Mod: GP | Performed by: PHYSICAL THERAPIST

## 2018-07-18 PROCEDURE — 97110 THERAPEUTIC EXERCISES: CPT | Mod: GP | Performed by: PHYSICAL THERAPIST

## 2018-07-18 PROCEDURE — G8979 MOBILITY GOAL STATUS: HCPCS | Mod: GP | Performed by: PHYSICAL THERAPIST

## 2018-07-18 PROCEDURE — G8980 MOBILITY D/C STATUS: HCPCS | Mod: GP | Performed by: PHYSICAL THERAPIST

## 2018-07-18 NOTE — MR AVS SNAPSHOT
After Visit Summary   7/18/2018    Jaylen De La Vega    MRN: [id number]           Patient Information     Date Of Birth          1945        Visit Information        Provider Department      7/18/2018 9:40 AM Rosio Escamilla PT [facility] [place]        Today's Diagnoses     Left foot pain           Follow-ups after your visit        Your next 10 appointments already scheduled     Aug 08, 2018  1:30 PM CDT   LAB with [facility]   [facility]    [address] [id number] [phone]           Please do not eat 10-12 hours before your appointment if you are coming in fasting for labs on lipids, cholesterol, or glucose (sugar). This does not apply to pregnant women. Water, hot tea and black coffee (with nothing added) are okay. Do not drink other fluids, diet soda or chew gum.            Nov 07, 2018  1:20 PM CST   SHORT with Tj Verma Jr., MD   [facility]    [address] [address] [phone]              Who to contact     If you have questions or need follow up information about today's clinic visit or your schedule please contact [facility] [place] directly at [phone].  Normal or non-critical lab and imaging results will be communicated to you by MyChart, letter or phone within 4 business days after the clinic has received the results. If you do not hear from us within 7 days, please contact the clinic through MyChart or phone. If you have a critical or abnormal lab result, we will notify you by phone as soon as possible.  Submit refill requests through Konk[employer] or call your pharmacy and they will forward the refill request to us. Please allow 3 business days for your refill to be completed.          Additional Information About Your Visit        Ref[employer]Brighthar[employer]Life Software Information     K[employer]ura gives you secure access to your electronic health record. If  you see a primary care provider, you can also send messages to your care team and make appointments. If you have questions, please call your primary care clinic.  If you do not have a primary care provider, please call [phone] and they will assist you.        Care EveryWhere ID     This is your Care EveryWhere ID. This could be used by other organizations to access your [facility] medical records  [id number]         Blood Pressure from Last 3 Encounters:   05/14/18 118/78   05/04/18 108/62   04/12/18 112/66    Weight from Last 3 Encounters:   05/14/18 80.3 kg (177 lb)   05/04/18 80.5 kg (177 lb 6.4 oz)   04/12/18 82.6 kg (182 lb)              We Performed the Following     MALIA PROGRESS NOTES REPORT     MANUAL THER TECH,1+REGIONS,EA 15 MIN     THERAPEUTIC EXERCISES        Primary Care Provider Office Phone # Fax #    Tj Verma Jr., [phone] [address] [facility] [address]        Equal Access to Services     [facility]: Hadii aad ku hadasho Soomaali, waaxda luqadaha, qaybta kaalmada adeegyada, waxay idiin hayaan haylee jean . So [facility] [phone].    ATENCIÓN: Si habla español, tiene a cunningham disposición servicios gratuitos de asistencia lingüística. [facility] [phone].    We comply with applicable federal civil rights laws and [place] laws. We do not discriminate on the basis of race, color, national origin, age, disability, sex, sexual orientation, or gender identity.            Thank you!     Thank you for choosing [facility]  for your care. Our goal is always to provide you with excellent care. Hearing back from our patients is one way we can continue to improve our services. Please take a few minutes to complete the written survey that you may receive in the mail after your visit with us. Thank you!             Your Updated Medication List - Protect others around you: Learn how to safely use, store and throw away your medicines at  www.disposemymeds.org.          This list is accurate as of 7/18/18 10:18 AM.  Always use your most recent med list.                   Brand Name Dispense Instructions for use Diagnosis    albuterol 108 (90 Base) MCG/ACT Inhaler    PROAIR HFA/PROVENTIL HFA/VENTOLIN HFA    1 Inhaler    Inhale 2 puffs into the lungs every 6 hours as needed for shortness of breath / dyspnea or wheezing    Chronic obstructive pulmonary disease, unspecified COPD type (H)       aspirin 81 MG tablet     30 tablet    Take 1 tablet (81 mg) by mouth daily    Type 2 diabetes mellitus without complication, without long-term current use of insulin (H)       blood glucose monitoring test strip    ONETOUCH VERIO IQ    100 each    by In Vitro route 2 times daily    Type 2 diabetes mellitus without complication, without long-term current use of insulin (H)       carisoprodol 350 MG tablet    SOMA    60 tablet    Take 1 tablet (350 mg) by mouth 3 times daily as needed for muscle spasms    Spasm of back muscles       cetirizine 10 MG tablet    zyrTEC    30 tablet    Take 1 tablet (10 mg) by mouth every evening    Post-nasal drip       CO Q 10 PO           fluticasone 50 MCG/ACT spray    FLONASE    1 Bottle    Spray 1-2 sprays into both nostrils daily    Seasonal allergic rhinitis       glimepiride 4 MG tablet    AMARYL    90 tablet    Take 1 tablet (4 mg) by mouth every morning (before breakfast)    Type 2 diabetes mellitus without complication, without long-term current use of insulin (H)       losartan-hydrochlorothiazide 100-25 MG per tablet    HYZAAR    90 tablet    Take 1 tablet by mouth daily    Essential hypertension       metFORMIN 500 MG tablet    GLUCOPHAGE    360 tablet    Take 1 tablet (500 mg) by mouth 2 times daily (with meals)    Type 2 diabetes mellitus without complication, without long-term current use of insulin (H)       nitroGLYcerin 0.4 MG sublingual tablet    NITROSTAT    25 tablet    Place 1 tablet under the tongue every 5  minutes as needed for chest pain.    Chest pain       ONETOUCH DELICA LANCETS 33G Misc     100 each    1 each 2 times daily    Type 2 diabetes mellitus without complication, without long-term current use of insulin (H)       order for DME     1 Device    Equipment being ordered: ankle brace    Chronic pain of left ankle, Peroneal tendinitis of left lower extremity       simvastatin 20 MG tablet    ZOCOR    90 tablet    Take 1 tablet (20 mg) by mouth At Bedtime at bedtime.    Mixed hyperlipidemia       VITAMIN D PO      1 tablet qd

## 2018-07-18 NOTE — PROGRESS NOTES
Subjective:  HPI                    Objective:  System    Physical Exam    General     ROS    Assessment/Plan:    DISCHARGE REPORT    Progress reporting period is from 5/21/18 to 7/18/18.       SUBJECTIVE  Subjective: Overall still feeling very good. Near 90% better since intial eval. Continues to self massage, and ice daily. Walking daily >10,000 (longest walk was 3.5miles) with very minimal pain.      Current Pain level: 1/10.      Initial Pain level: 8/10.   Changes in function:  Yes (See Goal flowsheet attached for changes in current functional level)  Adverse reaction to treatment or activity: None    OBJECTIVE  Changes noted in objective findings:  Yes,   Objective: AROM: DF/PF= 5/63. Very good tolerance to manual therapy today. Good understanding of HEP. Pt is progressing very well, will continue home program.     ASSESSMENT/PLAN  Updated problem list and treatment plan: Diagnosis 1:  L ankle pain    STG/LTGs have been met or progress has been made towards goals:  Yes (See Goal flow sheet completed today.)  Assessment of Progress: The patient's condition is improving.  Self Management Plans:  Patient has been instructed in a home treatment program.  Patient is independent in a home treatment program.  I have re-evaluated this patient and find that the nature, scope, duration and intensity of the therapy is appropriate for the medical condition of the patient.  Jaylen continues to require the following intervention to meet STG and LTG's:  PT intervention is no longer required to meet STG/LTG.    Recommendations:  This patient is ready to be discharged from therapy and continue their home treatment program.    Please refer to the daily flowsheet for treatment today, total treatment time and time spent performing 1:1 timed codes.

## 2018-08-09 DIAGNOSIS — E11.9 TYPE 2 DIABETES MELLITUS WITHOUT COMPLICATION, WITHOUT LONG-TERM CURRENT USE OF INSULIN (H): ICD-10-CM

## 2018-08-09 LAB — HBA1C MFR BLD: 7.1 % (ref 0–5.6)

## 2018-08-09 PROCEDURE — 83036 HEMOGLOBIN GLYCOSYLATED A1C: CPT | Performed by: FAMILY MEDICINE

## 2018-08-09 PROCEDURE — 36415 COLL VENOUS BLD VENIPUNCTURE: CPT | Performed by: FAMILY MEDICINE

## 2018-08-09 NOTE — PROGRESS NOTES
Mr. De La Vega,    -A1C (test of diabetes control the last 2-3 months) is at your goal. Please continue with current plan. This looks so much better, Nik!  We'll discuss your lab results at your upcoming appointment.   If you have further questions about the interpretation of your labs, labtestsonline.org is a good website to check out for further information.    Please contact the clinic if you have additional questions.  Thank you.    Sincerely,    Tj Verma MD

## 2018-08-14 DIAGNOSIS — E11.9 TYPE 2 DIABETES MELLITUS WITHOUT COMPLICATION, WITHOUT LONG-TERM CURRENT USE OF INSULIN (H): ICD-10-CM

## 2018-08-14 NOTE — TELEPHONE ENCOUNTER
Prescription approved per [facility] Refill Protocol.  Myra Zavala, RN, BSN  [facility]

## 2018-08-14 NOTE — TELEPHONE ENCOUNTER
"Requested Prescriptions   Pending Prescriptions Disp Refills     blood glucose monitoring (ONETOUCH VERIO IQ) test strip  Last Written Prescription Date:  12/13/2016  Last Fill Quantity: 100 each,  # refills: 1   Last office visit: 5/4/2018 with prescribing provider:  Bayron     Future Office Visit:   Next 5 appointments (look out 90 days)     Sep 24, 2018  9:40 AM CDT   SHORT with Tj Verma Jr., MD   [facility]    [address] [id number] [phone]                    100 each 1     Sig: by In Vitro route 2 times daily    Diabetic Supplies Protocol Passed    8/14/2018  9:11 AM       Passed - Patient is 18 years of age or older       Passed - Recent (6 mo) or future (30 days) visit within the authorizing provider's specialty    Patient had office visit in the last 6 months or has a visit in the next 30 days with authorizing provider.  See \"Patient Info\" tab in inbasket, or \"Choose Columns\" in Meds & Orders section of the refill encounter.              "

## 2018-09-24 ENCOUNTER — OFFICE VISIT (OUTPATIENT)
Dept: FAMILY MEDICINE | Facility: CLINIC | Age: 73
End: 2018-09-24
Payer: COMMERCIAL

## 2018-09-24 VITALS
DIASTOLIC BLOOD PRESSURE: 64 MMHG | OXYGEN SATURATION: 93 % | WEIGHT: 181 LBS | TEMPERATURE: 97.8 F | SYSTOLIC BLOOD PRESSURE: 100 MMHG | BODY MASS INDEX: 25.34 KG/M2 | HEART RATE: 95 BPM | HEIGHT: 71 IN

## 2018-09-24 DIAGNOSIS — Z23 NEED FOR PROPHYLACTIC VACCINATION AND INOCULATION AGAINST INFLUENZA: ICD-10-CM

## 2018-09-24 DIAGNOSIS — E78.5 HYPERLIPIDEMIA LDL GOAL <70: ICD-10-CM

## 2018-09-24 DIAGNOSIS — J44.9 CHRONIC OBSTRUCTIVE PULMONARY DISEASE, UNSPECIFIED COPD TYPE (H): ICD-10-CM

## 2018-09-24 DIAGNOSIS — E11.9 TYPE 2 DIABETES MELLITUS WITHOUT COMPLICATION, WITHOUT LONG-TERM CURRENT USE OF INSULIN (H): Primary | ICD-10-CM

## 2018-09-24 LAB
CHOLEST SERPL-MCNC: 155 MG/DL
CREAT UR-MCNC: 114 MG/DL
FEF 25/75: NORMAL
FEV-1: NORMAL
FEV1/FVC: NORMAL
FVC: NORMAL
HDLC SERPL-MCNC: 39 MG/DL
LDLC SERPL CALC-MCNC: 66 MG/DL
MICROALBUMIN UR-MCNC: 13 MG/L
MICROALBUMIN/CREAT UR: 11.32 MG/G CR (ref 0–17)
NONHDLC SERPL-MCNC: 116 MG/DL
TRIGL SERPL-MCNC: 249 MG/DL

## 2018-09-24 PROCEDURE — 90662 IIV NO PRSV INCREASED AG IM: CPT | Performed by: FAMILY MEDICINE

## 2018-09-24 PROCEDURE — 82043 UR ALBUMIN QUANTITATIVE: CPT | Performed by: FAMILY MEDICINE

## 2018-09-24 PROCEDURE — 90471 IMMUNIZATION ADMIN: CPT | Performed by: FAMILY MEDICINE

## 2018-09-24 PROCEDURE — 99214 OFFICE O/P EST MOD 30 MIN: CPT | Mod: 25 | Performed by: FAMILY MEDICINE

## 2018-09-24 PROCEDURE — 80061 LIPID PANEL: CPT | Performed by: FAMILY MEDICINE

## 2018-09-24 PROCEDURE — 36415 COLL VENOUS BLD VENIPUNCTURE: CPT | Performed by: FAMILY MEDICINE

## 2018-09-24 PROCEDURE — 94010 BREATHING CAPACITY TEST: CPT | Performed by: FAMILY MEDICINE

## 2018-09-24 RX ORDER — SIMVASTATIN 20 MG
20 TABLET ORAL AT BEDTIME
Qty: 90 TABLET | Refills: 3 | Status: SHIPPED | OUTPATIENT
Start: 2018-09-24 | End: 2019-12-10

## 2018-09-24 RX ORDER — METFORMIN HCL 500 MG
500 TABLET, EXTENDED RELEASE 24 HR ORAL
Qty: 90 TABLET | Refills: 1 | Status: SHIPPED | OUTPATIENT
Start: 2018-09-24 | End: 2019-01-18

## 2018-09-24 RX ORDER — ALBUTEROL SULFATE 90 UG/1
2 AEROSOL, METERED RESPIRATORY (INHALATION) EVERY 6 HOURS PRN
Qty: 1 INHALER | Refills: 3 | Status: SHIPPED | OUTPATIENT
Start: 2018-09-24 | End: 2020-06-12

## 2018-09-24 RX ORDER — TIOTROPIUM BROMIDE 18 UG/1
CAPSULE ORAL; RESPIRATORY (INHALATION)
Qty: 90 CAPSULE | Refills: 3 | Status: SHIPPED | OUTPATIENT
Start: 2018-09-24 | End: 2018-09-26

## 2018-09-24 NOTE — MR AVS SNAPSHOT
After Visit Summary   9/24/2018    Jaylen De La Vega    MRN: [id number]           Patient Information     Date Of Birth          1945        Visit Information        Provider Department      9/24/2018 9:40 AM [facility]        Today's Diagnoses     Type 2 diabetes mellitus without complication, without long-term current use of insulin (H)    -  1    Hyperlipidemia LDL goal <70        Chronic obstructive pulmonary disease, unspecified COPD type (H)        Need for prophylactic vaccination and inoculation against influenza           Follow-ups after your visit        Follow-up notes from your care team     Return in about 6 months (around 3/24/2019) for Diabetes Recheck.      Who to contact     If you have questions or need follow up information about today's clinic visit or your schedule please contact [facility] directly at [phone].  Normal or non-critical lab and imaging results will be communicated to you by OberScharrerhart, letter or phone within 4 business days after the clinic has received the results. If you do not hear from us within 7 days, please contact the clinic through OberScharrerhart or phone. If you have a critical or abnormal lab result, we will notify you by phone as soon as possible.  Submit refill requests through Miiix or call your pharmacy and they will forward the refill request to us. Please allow 3 business days for your refill to be completed.          Additional Information About Your Visit        MyChart Information     [employer] gives you secure access to your electronic health record. If you see a primary care provider, you can also send messages to your care team and make appointments. If you have questions, please call your primary care clinic.  If you do not have a primary care provider, please call [phone] and they will assist you.        Care EveryWhere ID     This is your Care EveryWhere ID. This could be used by  "other organizations to access your [place] medical records  [id number]        Your Vitals Were     Pulse Temperature Height Pulse Oximetry BMI (Body Mass Index)       95 97.8  F (36.6  C) (Oral) 5' 11\" (1.803 m) 93% 25.24 kg/m2        Blood Pressure from Last 3 Encounters:   09/24/18 100/64   05/14/18 118/78   05/04/18 108/62    Weight from Last 3 Encounters:   09/24/18 181 lb (82.1 kg)   05/14/18 177 lb (80.3 kg)   05/04/18 177 lb 6.4 oz (80.5 kg)              We Performed the Following     Albumin Random Urine Quantitative with Creat Ratio     COPD ACTION PLAN     FLU VACCINE, INCREASED ANTIGEN, PRESV FREE, AGE 65+ [35542]     Lipid panel reflex to direct LDL Non-fasting     Spirometry, Breathing Capacity: Normal Order, Clinic Performed     Vaccine Administration, Initial [90676]          Today's Medication Changes          These changes are accurate as of 9/24/18 10:50 AM.  If you have any questions, ask your nurse or doctor.               Start taking these medicines.        Dose/Directions    metFORMIN 500 MG 24 hr tablet   Commonly known as:  GLUCOPHAGE-XR   Used for:  Type 2 diabetes mellitus without complication, without long-term current use of insulin (H)   Replaces:  metFORMIN 500 MG tablet   Started by:  Tj Verma Jr., MD        Dose:  500 mg   Take 1 tablet (500 mg) by mouth daily (with dinner)   Quantity:  90 tablet   Refills:  1       tiotropium 18 MCG capsule   Commonly known as:  SPIRIVA HANDIHALER   Used for:  Chronic obstructive pulmonary disease, unspecified COPD type (H)   Started by:  Tj Verma Jr., MD        Inhale contents of one capsule daily.   Quantity:  90 capsule   Refills:  3         Stop taking these medicines if you haven't already. Please contact your care team if you have questions.     metFORMIN 500 MG tablet   Commonly known as:  GLUCOPHAGE   Replaced by:  metFORMIN 500 MG 24 hr tablet   Stopped by:  Tj Verma Jr., MD                Where to get " your medicines      These medications were sent to [facility] [id number] [facility] - [place], [place] - [address]  [address] [place] [address]     Phone:  [phone]     albuterol 108 (90 Base) MCG/ACT inhaler    blood glucose monitoring test strip    metFORMIN 500 MG 24 hr tablet    simvastatin 20 MG tablet    tiotropium 18 MCG capsule                Primary Care Provider Office Phone # Fax #    Tj Verma Jr., [phone] [address]  [place] [address]        Equal Access to Services     [facility]: Hadii aad ku hadasho Soomaali, waaxda luqadaha, qaybta kaalmada adeegyada, waxay maryin hayaan adejune jean . So [facility] [place] [phone].    ATENCIÓN: Si habla español, tiene a cunningham disposición servicios gratuitos de asistencia lingüística. [facility] [phone].    We comply with applicable federal civil rights laws and [place] laws. We do not discriminate on the basis of race, color, national origin, age, disability, sex, sexual orientation, or gender identity.            Thank you!     Thank you for choosing [facility]  for your care. Our goal is always to provide you with excellent care. Hearing back from our patients is one way we can continue to improve our services. Please take a few minutes to complete the written survey that you may receive in the mail after your visit with us. Thank you!             Your Updated Medication List - Protect others around you: Learn how to safely use, store and throw away your medicines at www.disposemymeds.org.          This list is accurate as of 9/24/18 10:50 AM.  Always use your most recent med list.                   Brand Name Dispense Instructions for use Diagnosis    albuterol 108 (90 Base) MCG/ACT inhaler    PROAIR HFA/PROVENTIL HFA/VENTOLIN HFA    1 Inhaler    Inhale 2 puffs into the lungs every 6 hours as needed for shortness of breath / dyspnea or wheezing    Chronic obstructive pulmonary disease, unspecified  COPD type (H)       aspirin 81 MG tablet     30 tablet    Take 1 tablet (81 mg) by mouth daily    Type 2 diabetes mellitus without complication, without long-term current use of insulin (H)       blood glucose monitoring test strip    ONETOUCH VERIO IQ    100 each    1 strip by In Vitro route 2 times daily    Type 2 diabetes mellitus without complication, without long-term current use of insulin (H)       carisoprodol 350 MG tablet    SOMA    60 tablet    Take 1 tablet (350 mg) by mouth 3 times daily as needed for muscle spasms    Spasm of back muscles       cetirizine 10 MG tablet    zyrTEC    30 tablet    Take 1 tablet (10 mg) by mouth every evening    Post-nasal drip       CO Q 10 PO           fluticasone 50 MCG/ACT spray    FLONASE    1 Bottle    Spray 1-2 sprays into both nostrils daily    Seasonal allergic rhinitis       glimepiride 4 MG tablet    AMARYL    90 tablet    Take 1 tablet (4 mg) by mouth every morning (before breakfast)    Type 2 diabetes mellitus without complication, without long-term current use of insulin (H)       losartan-hydrochlorothiazide 100-25 MG per tablet    HYZAAR    90 tablet    Take 1 tablet by mouth daily    Essential hypertension       metFORMIN 500 MG 24 hr tablet    GLUCOPHAGE-XR    90 tablet    Take 1 tablet (500 mg) by mouth daily (with dinner)    Type 2 diabetes mellitus without complication, without long-term current use of insulin (H)       nitroGLYcerin 0.4 MG sublingual tablet    NITROSTAT    25 tablet    Place 1 tablet under the tongue every 5 minutes as needed for chest pain.    Chest pain       ONETOUCH DELICA LANCETS 33G Misc     100 each    1 each 2 times daily    Type 2 diabetes mellitus without complication, without long-term current use of insulin (H)       order for DME     1 Device    Equipment being ordered: ankle brace    Chronic pain of left ankle, Peroneal tendinitis of left lower extremity       simvastatin 20 MG tablet    ZOCOR    90 tablet    Take 1  tablet (20 mg) by mouth At Bedtime at bedtime.    Hyperlipidemia LDL goal <70       tiotropium 18 MCG capsule    SPIRIVA HANDIHALER    90 capsule    Inhale contents of one capsule daily.    Chronic obstructive pulmonary disease, unspecified COPD type (H)       VITAMIN D PO      1 tablet qd

## 2018-09-24 NOTE — LETTER
My COPD Action Plan     Name: Jaylen De La Vega    YOB: 1945   Date: 9/24/2018    My doctor: Tj Verma Jr, MD   My clinic: [address] [address] [phone]  My Controller Medicine: Tiotropium (Spiriva)   Dose: 1 puff BID     My Rescue Medicine: Albuterol (Proair/Ventolin/Proventil) inhaler   Dose: 2 puffs every 4 hours as needed     My Flare Up Medicine: None   Dose:      My COPD Severity: Moderate = FeV1 < 79% -50%      Use of Oxygen: Oxygen Not Prescribed      Make sure you've had your pneumonia   vaccines.          GREEN ZONE       Doing well today      Usual level of activity and exercise    Usual amount of cough and mucus    No shortness of breath    Usual level of health (thinking clearly, sleeping well, feel like eating) Actions:      Take daily medicines    Use oxygen as prescribed    Follow regular exercise and diet plan    Avoid cigarette smoke and other irritants that harm the lungs           YELLOW ZONE          Having a bad day or flare up      Short of breath more than usual    A lot more sputum (mucus) than usual    Sputum looks yellow, green, tan, brown or bloody    More coughing or wheezing    Fever or chills    Less energy; trouble completing activities    Trouble thinking or focusing    Using quick relief inhaler or nebulizer more often    Poor sleep; symptoms wake me up    Do not feel like eating Actions:      Get plenty of rest    Take daily medicines    Use quick relief inhaler every 4 hours    If you use oxygen, call you doctor to see if you should adjust your oxygen    Do breathing exercises or other things to help you relax    Let a loved one, friend or neighbor know you are feeling worse    Call your care team if you have 2 or more symptoms.  Start taking steroids or antibiotics if directed by your care team           RED ZONE       Need medical care now      Severe shortness of breath (feel you can't breathe)    Fever,  chills    Not enough breath to do any activity    Trouble coughing up mucus, walking or talking    Blood in mucus    Frequent coughing   Rescue medicines are not working    Not able to sleep because of breathing    Feel confused or drowsy    Chest pain    Actions:      Call your health care team.  If you cannot reach your care team, call 911 or go to the emergency room.        Annual Reminders:  Meet with Care Team, Flu Shot every Fall  Pharmacy:    [facility] [place]  [facility] [address] [place] [id number] [facility] [address]  WRITTEN PRESCRIPTION REQUESTED

## 2018-09-25 ENCOUNTER — TELEPHONE (OUTPATIENT)
Dept: FAMILY MEDICINE | Facility: CLINIC | Age: 73
End: 2018-09-25

## 2018-09-25 DIAGNOSIS — J44.9 CHRONIC OBSTRUCTIVE PULMONARY DISEASE, UNSPECIFIED COPD TYPE (H): Primary | ICD-10-CM

## 2018-09-25 NOTE — TELEPHONE ENCOUNTER
Please see message from pharmacy below. Please advise. Thank you.  Myra Zavala, RN, BSN  [facility]

## 2018-09-25 NOTE — PROGRESS NOTES
Mr. De La Vega,    -Cholesterol levels are at your goal levels.  ADVISE: Continuing your medication, a regular exercise program with at least 30 minutes of aerobic exercise 3-4 days/week ( 45 minutes 4-6 days/week if weight loss needed), and a low saturated fat,/low carbohydrate diet are helpful to maintain this.  Rechecking your fasting cholesterol panel in 12 months is recommended (Lipid w/ LDL reflex).    If you have further questions about the interpretation of your labs, [email or web address] is a good website to check out for further information.    Please contact the clinic if you have additional questions.  Thank you.    Sincerely,    Tj Verma MD

## 2018-09-25 NOTE — TELEPHONE ENCOUNTER
Per pharmacy:  Alternative requested due to non-formulary.  Insurance prefers Incruse.  Please send new Rx.

## 2018-09-25 NOTE — PROGRESS NOTES
Mr. De La Vega,    -Microalbumin (urine protein) test is normal.  ADVISE: recheck annually    If you have further questions about the interpretation of your labs, [email or web address] is a good website to check out for further information.    Please contact the clinic if you have additional questions.  Thank you.    Sincerely,    Tj Verma MD

## 2018-09-26 NOTE — TELEPHONE ENCOUNTER
Changed to Incruse.  Chart reviewed.  Rx sent to pt's preferred pharmacy.       [facility] [place]  [facility] [id number] [place] [id number] [facility] [address]  WRITTEN PRESCRIPTION REQUESTED    Tj Verma MD

## 2018-12-11 DIAGNOSIS — E11.9 TYPE 2 DIABETES MELLITUS WITHOUT COMPLICATION, WITHOUT LONG-TERM CURRENT USE OF INSULIN (H): ICD-10-CM

## 2018-12-11 NOTE — TELEPHONE ENCOUNTER
"Requested Prescriptions   Pending Prescriptions Disp Refills     glimepiride (AMARYL) 4 MG tablet  Last Written Prescription Date:  7/5/2018  Last Fill Quantity: 90 tablet,  # refills: 1   Last office visit: 9/24/2018 with prescribing provider:  Bayron     Future Office Visit:       90 tablet 1     Sig: Take 1 tablet (4 mg) by mouth every morning (before breakfast)    Sulfonylurea Agents Passed - 12/11/2018  9:26 AM       Passed - Blood pressure less than 140/90 in past 6 months    BP Readings from Last 3 Encounters:   09/24/18 100/64   05/14/18 118/78   05/04/18 108/62            Passed - Patient has documented LDL within the past 12 mos.    Recent Labs   Lab Test 09/24/18  1053   LDL 66            Passed - Patient has had a Microalbumin in the past 12 mos.    Recent Labs   Lab Test 09/24/18  1053   MICROL 13   UMALCR 11.32            Passed - Patient has documented A1c within the specified period of time.    If HgbA1C is 8 or greater, it needs to be on file within the past 3 months.  If less than 8, must be on file within the past 6 months.     Recent Labs   Lab Test 08/09/18  0829   A1C 7.1*            Passed - Patient is age 18 or older       Passed - Patient has a recent creatinine (normal) within the past 12 mos.    Recent Labs   Lab Test 04/12/18  0850   CR 1.08            Passed - Recent (6 mo) or future (30 days) visit within the authorizing provider's specialty    Patient had office visit in the last 6 months or has a visit in the next 30 days with authorizing provider or within the authorizing provider's specialty.  See \"Patient Info\" tab in inbasket, or \"Choose Columns\" in Meds & Orders section of the refill encounter.              "

## 2018-12-13 RX ORDER — GLIMEPIRIDE 4 MG/1
4 TABLET ORAL
Qty: 90 TABLET | Refills: 0 | Status: SHIPPED | OUTPATIENT
Start: 2018-12-13 | End: 2019-03-10

## 2018-12-13 NOTE — TELEPHONE ENCOUNTER
Prescription approved per [facility] Refill Protocol.  Myra Zavala, RN, BSN  [facility]

## 2019-01-04 ENCOUNTER — MYC MEDICAL ADVICE (OUTPATIENT)
Dept: PODIATRY | Facility: CLINIC | Age: 74
End: 2019-01-04

## 2019-01-11 ENCOUNTER — TELEPHONE (OUTPATIENT)
Dept: FAMILY MEDICINE | Facility: CLINIC | Age: 74
End: 2019-01-11

## 2019-01-11 ENCOUNTER — OFFICE VISIT (OUTPATIENT)
Dept: PODIATRY | Facility: CLINIC | Age: 74
End: 2019-01-11
Payer: COMMERCIAL

## 2019-01-11 VITALS
WEIGHT: 181 LBS | DIASTOLIC BLOOD PRESSURE: 70 MMHG | HEIGHT: 71 IN | BODY MASS INDEX: 25.34 KG/M2 | SYSTOLIC BLOOD PRESSURE: 120 MMHG

## 2019-01-11 DIAGNOSIS — M25.572 CHRONIC PAIN OF LEFT ANKLE: Primary | ICD-10-CM

## 2019-01-11 DIAGNOSIS — G89.29 CHRONIC PAIN OF LEFT ANKLE: Primary | ICD-10-CM

## 2019-01-11 DIAGNOSIS — E11.9 TYPE 2 DIABETES MELLITUS WITHOUT COMPLICATION, WITHOUT LONG-TERM CURRENT USE OF INSULIN (H): Primary | ICD-10-CM

## 2019-01-11 DIAGNOSIS — Q66.70 PES CAVUS: ICD-10-CM

## 2019-01-11 DIAGNOSIS — M76.72 PERONEAL TENDONITIS, LEFT: ICD-10-CM

## 2019-01-11 PROCEDURE — 99213 OFFICE O/P EST LOW 20 MIN: CPT | Performed by: PODIATRIST

## 2019-01-11 RX ORDER — DEXAMETHASONE SODIUM PHOSPHATE 4 MG/ML
4 INJECTION, SOLUTION INTRA-ARTICULAR; INTRALESIONAL; INTRAMUSCULAR; INTRAVENOUS; SOFT TISSUE ONCE
Qty: 30 ML | Refills: 0 | Status: SHIPPED | OUTPATIENT
Start: 2019-01-11 | End: 2019-06-26

## 2019-01-11 ASSESSMENT — MIFFLIN-ST. JEOR: SCORE: 1593.14

## 2019-01-11 NOTE — LETTER
"    1/11/2019         RE: Jaylen De La Vega  [address]  [place] [address]        Dear Colleague,    Thank you for referring your patient, Jaylen De La Vega, to the [facility]. Please see a copy of my visit note below.    Podiatry / Foot and Ankle Surgery Progress Note    January 11, 2019    Subject: Patient was seen for follow up on left ankle pain.  Notes It is in the same spot as last time. Has been going on for a few weeks. It is intermittent, more with walking. Can be 6/10 at its worst. Denies acute injury.     Objective:  Vitals: /70   Ht 1.803 m (5' 11\")   Wt 82.1 kg (181 lb)   BMI 25.24 kg/m     BMI= Body mass index is 25.24 kg/m .    General:  Patient is alert and orientated.  NAD  Dermatologic: Skin is intact to both lower extremities without significant lesions, rash or abrasion.  No paronychia or evidence of soft tissue infection is noted.     Vascular: DP & PT pulses are intact & regular bilaterally.  No significant edema or varicosities noted.  CFT and skin temperature is normal to both lower extremities.     Neurologic: Lower extremity sensation is intact to light touch.  No evidence of weakness or contracture in the lower extremities.  No evidence of neuropathy.     Musculoskeletal: Patient is ambulatory without assistive device or brace.  Pain on palpation of the left peroneal tendon with palpation behind fibula and with eversion. Increase arch height.      ASSESSMENT:    Chronic pain of left ankle  Peroneal tendinitis of left lower extremity  Pes cavus     PLAN:  Reviewed patient's chart in [facility]. Reviewed and discussed causes of tendonitis.  We discussed treatments such as immobiliation, icing, stretching, heel lifts, orthotics, physical therapy, MRI.      Recommend PT again as it helped last time. If pain is not improved in 4-6 weeks, recommend repeat MRI.     Ngoc Elkins DPM, Podiatry/Foot and Ankle Surgery    Weight management plan: Patient was referred to their " PCP to discuss a diet and exercise plan.    Patient to follow up with Primary Care provider regarding elevated blood pressure.      Again, thank you for allowing me to participate in the care of your patient.        Sincerely,        Ngoc Elkins DPM, Podiatry/Foot and Ankle Surgery

## 2019-01-11 NOTE — TELEPHONE ENCOUNTER
Name of caller: Jaylen  Relationship of Patient: Self    Reason for Call: PT called because he got a notice to have his A1C done, he is scheduled for 1/16 at 9am in the lab. Could you please place an order.     Best phone number to reach pt at is: [phone]  Ok to leave a message with medical info? Yes    Myra Escalante  Patient Representative - [facility]

## 2019-01-11 NOTE — TELEPHONE ENCOUNTER
Per OV notes 9/24 patient declined to have A1C done at that time because had been done 6 weeks earlier. Dr. Verma please see below and review order and place any additional you may want. Thank you, Kathleen Faust R.N.

## 2019-01-11 NOTE — PATIENT INSTRUCTIONS
Thank you for choosing [facility]!    DR. QUICK'S CLINIC SCHEDULE  MONDAY AM - [place] TUESDAY - [place]   [address] [address] [place] [address] [place] [address]   [phone] / [phone] / [phone]       WEDNESDAY - [place] FRIDAY AM - [facility]   [address]   [place] [address] [place] [address]   [phone] / [phone]       FRIDAY PM - [place] SCHEDULE SURGERY: [phone]   [address] BILLING QUESTIONS: [phone]   [place] [address] AFTER HOURS: [phone]   [phone] / [phone] APPOINTMENTS: [phone]     Consumer Price Line (CPL) [phone]       Follow up: as needed      TENDONITIS   Tendons are the strong fibrous portions ofmuscles that attach to bones and allow the muscle to move a joint when it contracts. Tendons are very strong because they have a lot of force exerted on them. Sometimes tendons can become painful because they have suffered an acute injury, in which too much force was exerted at one time, or an overuse injury, in which a normal force was exerted too frequently or over a prolonged period of time. As a result, there is damage to the tendon and its surrounding soft tissue structures and they become inflammed. Because tendons do not have a great blood supply, they do not heal rapidly and the inflammation can become chronic.   Conservative treatment for tendinitis involves rest and anti-inflammatory measures. Ice is applied 15 minutes 2-3 times daily. Anti-inflammatory medications called NSAIDs (ibuprofen, example) can be taken provided they are used with caution, as they can lead to internal bleeding and increase the risk ofstroke and heart attack. Sometimes topical nitroglycerin is prescribed to help with pain. Often your doctor will use a special shoe or removable walking cast to immobilize the tendon, allowing  it to heal without further damage from use. These devices are very useful in helping tendons heal, but they may slow you down or make you feel like your hip, knee, or back are out ofalignment. This is temporary and should go away once you are out ofthe immobilization. You should not use a walking cast when showering or driving. Another option is Platelet Rich Plasma injections. (Normally done with a Sports and Orthorapedic doctor.   If conservative measures fail, your physician may need to surgically repair the tendon by removing any chronic inflammatory tissue and sewing it back together. Sometimes it is sewn to an adjacent tendon with similar function for support and sometimes it is lengthened. . Sometimes the bones around the tendon need to be realigned or reshaped to better support the tendon or prevent further damage. Your foot and ankle surgeon will discuss the specifics of your surgery with you, should you need it.    Towel stretch: Sit on a hard surface with your injured leg stretched out in front of you. Loop a towel around your toes and the ball of your foot and pull the towel toward your body keeping your leg straight. Hold this position for 15 to 30 seconds and then relax. Repeat 3 times. Then push the towel away with the ball of your foot. Repeat 3 times.  When you don't feel much of a stretch using the towel, you can start the standing calf stretch and the following exercises.  Standing calf stretch: Stand facing a wall with your hands on the wall at about eye level. Keep your injured leg back with your heel on the floor. Keep the other leg forward with the knee bent. Turn your back foot slightly inward (as if you were pigeon-toed). Slowly lean into the wall until you feel a stretch in the back of your calf. Hold the stretch for 15 to 30 seconds. Return to the starting position. Repeat 3 times. Do this exercise several times each day.   Standing soleus stretch: Stand facing a wall with your hands on  the wall at about chest height. Keep your injured leg back with your heel on the floor. Keep the other leg forward with the knee bent. Turn your back foot slightly inward (as if you were pigeon-toed). Bend your back knee slightly and gently lean into the wall until you feel a stretch in the lower calf of your injured leg. Hold the stretch for 15 to 30 seconds. Return to the starting position. Repeat 3 times.   Achilles stretch: Stand with the ball of one foot on a stair. Reach for the step below with your heel until you feel a stretch in the arch of your foot. Hold this position for 15 to 30 seconds and then relax. Repeat 3 times.   Heel raise: Balance yourself while standing behind a chair or counter. Using the chair or counter as a support to help you, raise your body up onto your toes and hold for 5 seconds. Then slowly lower yourself down without holding onto the support. (It's OK to keep holding onto the support if you need to.) When this exercise becomes less painful, try lowering yourself down on the injured leg only. Repeat 15 times. Do 2 sets of 15. Rest 30 seconds between sets.   Step-up: Stand with the foot of your injured leg on a support 3 to 5 inches high (like a small step or block of wood). Keep your other foot flat on the floor. Shift your weight onto the injured leg on the support. Straighten your injured leg as the other leg comes off the floor. Return to the starting position by bending your injured leg and slowly lowering your uninjured leg back to the floor. Do 2 sets of 15.   Resisted ankle eversion: Sit with both legs stretched out in front of you, with your feet about a shoulder's width apart. Tie a loop in one end of elastic tubing. Put the foot of your injured leg through the loop so that the tubing goes around the arch of that foot and wraps around the outside of the other foot. Hold onto the other end of the tubing with your hand to provide tension. Turn the foot of your injured leg up  and out. Make sure you keep your other foot still so that it will allow the tubing to stretch as you move the foot of your injured leg. Return to the starting position. Do 2 sets of 15.   Balance and reach exercises: Stand next to a chair with your injured leg farther from the chair. The chair will provide support if you need it. Stand on the foot of your injured leg and bend your knee slightly. Try to raise the arch of this foot while keeping your big toe on the floor. Keep your foot in this position. With the hand that is farther away from the chair, reach forward in front of you by bending at the waist. Avoid bending your knee any more as you do this. Repeat this 10 times. To make the exercise more challenging, reach farther in front of you. Do 2 sets of 10.  the same position as above. While keeping your arch height, reach the hand that is farther away from the chair across your body toward the chair. The farther you reach, the more challenging the exercise. Do 2 sets of 10.     Resisted ankle eversion: Sit with both legs stretched out in front of you, with your feet about a shoulder's width apart. Tie a loop in one end of elastic tubing. Put the foot of your injured leg through the loop so that the tubing goes around the arch of that foot and wraps around the outside of the other foot. Hold onto the other end of the tubing with your hand to provide tension. Turn the foot of your injured leg up and out. Make sure you keep your other foot still so that it will allow the tubing to stretch as you move the foot of your injured leg. Return to the starting position. Do 2 sets of 15.   If you have access to a wobble board, do the following exercises:  Wobble board exercises:   Stand on a wobble board with your feet shoulder width apart. Rock the board forwards and backwards 30 times, then side to side 30 times. Hold on to a chair if you need support.   Rotate the wobble board around so that the edge of the  board is in contact with the floor at all times. Do this 30 times in a clockwise and then a counterclockwise direction.   Balance on the wobble board for as long as you can without letting the edges touch the floor. Try to do this for 2 minutes without touching the floor.   Rotate the wobble board in clockwise and counterclockwise circles, but do not let the edge of the board touch the floor.   When you have mastered exercises A through D, try repeating them while standing on just your injured leg.   After you are able to do these exercises on one leg, try to do them with your eyes closed. Make sure you have something nearby to support you in case you lose your balance.        Body Mass Index (BMI)  Many things can cause foot and ankle problems. Foot structure, activity level, foot mechanics and injuries are common causes of pain.  One very important issue that often goes unmentioned, is body weight. Extra weight can cause increased stress on muscles, ligaments, bones and tendons.  Sometimes just a few extra pounds is all it takes to put one over her/his threshold. Without reducing that stress, it can be difficult to alleviate pain. Some people are uncomfortable addressing this issue, but we feel it is important for you to think about it. As Foot &  Ankle specialists, our job is addressing the lower extremity problem and possible causes. Regarding extra body weight, we encourage patients to discuss diet and weight management plans with their primary care doctors. It is this team approach that gives you the best opportunity for pain relief and getting you back on your feet.

## 2019-01-11 NOTE — PROGRESS NOTES
"Podiatry / Foot and Ankle Surgery Progress Note    January 11, 2019    Subject: Patient was seen for follow up on left ankle pain.  Notes It is in the same spot as last time. Has been going on for a few weeks. It is intermittent, more with walking. Can be 6/10 at its worst. Denies acute injury.     Objective:  Vitals: /70   Ht 1.803 m (5' 11\")   Wt 82.1 kg (181 lb)   BMI 25.24 kg/m    BMI= Body mass index is 25.24 kg/m .    General:  Patient is alert and orientated.  NAD  Dermatologic: Skin is intact to both lower extremities without significant lesions, rash or abrasion.  No paronychia or evidence of soft tissue infection is noted.     Vascular: DP & PT pulses are intact & regular bilaterally.  No significant edema or varicosities noted.  CFT and skin temperature is normal to both lower extremities.     Neurologic: Lower extremity sensation is intact to light touch.  No evidence of weakness or contracture in the lower extremities.  No evidence of neuropathy.     Musculoskeletal: Patient is ambulatory without assistive device or brace.  Pain on palpation of the left peroneal tendon with palpation behind fibula and with eversion. Increase arch height.      ASSESSMENT:    Chronic pain of left ankle  Peroneal tendinitis of left lower extremity  Pes cavus     PLAN:  Reviewed patient's chart in [facility]. Reviewed and discussed causes of tendonitis.  We discussed treatments such as immobiliation, icing, stretching, heel lifts, orthotics, physical therapy, MRI.      Recommend PT again as it helped last time. If pain is not improved in 4-6 weeks, recommend repeat MRI.     Ngoc Elkins DPM, Podiatry/Foot and Ankle Surgery    Weight management plan: Patient was referred to their PCP to discuss a diet and exercise plan.    Patient to follow up with Primary Care provider regarding elevated blood pressure.    "

## 2019-01-16 DIAGNOSIS — E11.9 TYPE 2 DIABETES MELLITUS WITHOUT COMPLICATION, WITHOUT LONG-TERM CURRENT USE OF INSULIN (H): ICD-10-CM

## 2019-01-16 LAB — HBA1C MFR BLD: 8.5 % (ref 0–5.6)

## 2019-01-16 PROCEDURE — 36415 COLL VENOUS BLD VENIPUNCTURE: CPT | Performed by: FAMILY MEDICINE

## 2019-01-16 PROCEDURE — 83036 HEMOGLOBIN GLYCOSYLATED A1C: CPT | Performed by: FAMILY MEDICINE

## 2019-01-18 DIAGNOSIS — E11.9 TYPE 2 DIABETES MELLITUS WITHOUT COMPLICATION, WITHOUT LONG-TERM CURRENT USE OF INSULIN (H): Primary | ICD-10-CM

## 2019-01-18 RX ORDER — METFORMIN HCL 500 MG
500 TABLET, EXTENDED RELEASE 24 HR ORAL 2 TIMES DAILY WITH MEALS
Qty: 90 TABLET | Refills: 1 | Status: SHIPPED | OUTPATIENT
Start: 2019-01-18 | End: 2019-05-24

## 2019-01-18 NOTE — RESULT ENCOUNTER NOTE
Mr. De La Vega,    -A1C test (average blood sugar the last 2-3 months) is above your goal.   ADVISE: Increase your metformin  mg from once per day to twice per day.  Please make a diabetic lab appointment in 8 weeks and then start an E-visit with me after your blood is drawn.  I'll send you a [employer] message in about six weeks reminding you to do this.     If you have further questions about the interpretation of your labs, [email or web address] is a good website to check out for further information.    Please contact the clinic if you have additional questions.  Thank you.    Sincerely,    Tj Verma MD

## 2019-03-10 DIAGNOSIS — E11.9 TYPE 2 DIABETES MELLITUS WITHOUT COMPLICATION, WITHOUT LONG-TERM CURRENT USE OF INSULIN (H): ICD-10-CM

## 2019-03-11 RX ORDER — GLIMEPIRIDE 4 MG/1
4 TABLET ORAL
Qty: 90 TABLET | Refills: 0 | Status: SHIPPED | OUTPATIENT
Start: 2019-03-11 | End: 2019-06-23

## 2019-03-11 NOTE — TELEPHONE ENCOUNTER
"Requested Prescriptions   Pending Prescriptions Disp Refills     glimepiride (AMARYL) 4 MG tablet [Pharmacy Med Name: GLIMEPIRIDE 4 MG TABLET]  Last Written Prescription Date:  12/13/2018  Last Fill Quantity: 90 tablet,  # refills: 0   Last office visit: 9/24/2018 with prescribing provider:  Bayron     Future Office Visit:       90 tablet 0     Sig: TAKE 1 TABLET (4 MG) BY MOUTH EVERY MORNING (BEFORE BREAKFAST)    Sulfonylurea Agents Passed - 3/10/2019  8:39 AM       Passed - Blood pressure less than 140/90 in past 6 months    BP Readings from Last 3 Encounters:   01/11/19 120/70   09/24/18 100/64   05/14/18 118/78            Passed - Patient has documented LDL within the past 12 mos.    Recent Labs   Lab Test 09/24/18  1053   LDL 66            Passed - Patient has had a Microalbumin in the past 12 mos.    Recent Labs   Lab Test 09/24/18  1053   MICROL 13   UMALCR 11.32            Passed - Patient has documented A1c within the specified period of time.    If HgbA1C is 8 or greater, it needs to be on file within the past 3 months.  If less than 8, must be on file within the past 6 months.     Recent Labs   Lab Test 01/16/19  0902   A1C 8.5*            Passed - Medication is active on med list       Passed - Patient is age 18 or older       Passed - Patient has a recent creatinine (normal) within the past 12 mos.    Recent Labs   Lab Test 04/12/18  0850   CR 1.08            Passed - Recent (6 mo) or future (30 days) visit within the authorizing provider's specialty    Patient had office visit in the last 6 months or has a visit in the next 30 days with authorizing provider or within the authorizing provider's specialty.  See \"Patient Info\" tab in inbasket, or \"Choose Columns\" in Meds & Orders section of the refill encounter.            "

## 2019-03-11 NOTE — TELEPHONE ENCOUNTER
SW-    Routing refill request to provider for review/approval because:  Labs elevated  I sent a mychart asking patient to schedule lab only then evisit per your last lab note    Calrissa Knapp RN- Triage [employer]

## 2019-03-19 ENCOUNTER — TRANSFERRED RECORDS (OUTPATIENT)
Dept: HEALTH INFORMATION MANAGEMENT | Facility: CLINIC | Age: 74
End: 2019-03-19

## 2019-03-20 DIAGNOSIS — E11.9 TYPE 2 DIABETES MELLITUS WITHOUT COMPLICATION, WITHOUT LONG-TERM CURRENT USE OF INSULIN (H): ICD-10-CM

## 2019-03-20 LAB — HBA1C MFR BLD: 7.6 % (ref 0–5.6)

## 2019-03-20 PROCEDURE — 83036 HEMOGLOBIN GLYCOSYLATED A1C: CPT | Performed by: FAMILY MEDICINE

## 2019-03-20 PROCEDURE — 36415 COLL VENOUS BLD VENIPUNCTURE: CPT | Performed by: FAMILY MEDICINE

## 2019-03-27 NOTE — RESULT ENCOUNTER NOTE
Mr. De La Vega,    -A1C (test of diabetes control the last 2-3 months) is at your goal. Please recheck your A1C test in 3 months.     If you have further questions about the interpretation of your labs, [email or web address] is a good website to check out for further information.    Please contact the clinic if you have additional questions.  Thank you.    Sincerely,    Tj Verma MD

## 2019-04-01 ENCOUNTER — THERAPY VISIT (OUTPATIENT)
Dept: PHYSICAL THERAPY | Facility: CLINIC | Age: 74
End: 2019-04-01
Attending: PODIATRIST
Payer: COMMERCIAL

## 2019-04-01 DIAGNOSIS — M25.572 PAIN IN JOINT INVOLVING ANKLE AND FOOT, LEFT: ICD-10-CM

## 2019-04-01 DIAGNOSIS — G89.29 CHRONIC PAIN OF LEFT ANKLE: ICD-10-CM

## 2019-04-01 DIAGNOSIS — Q66.70 PES CAVUS: ICD-10-CM

## 2019-04-01 DIAGNOSIS — M76.72 PERONEAL TENDONITIS, LEFT: ICD-10-CM

## 2019-04-01 DIAGNOSIS — M25.572 CHRONIC PAIN OF LEFT ANKLE: ICD-10-CM

## 2019-04-01 PROBLEM — M79.672 LEFT FOOT PAIN: Status: RESOLVED | Noted: 2018-05-21 | Resolved: 2019-04-01

## 2019-04-01 PROCEDURE — 97140 MANUAL THERAPY 1/> REGIONS: CPT | Mod: GP | Performed by: PHYSICAL THERAPIST

## 2019-04-01 PROCEDURE — 97161 PT EVAL LOW COMPLEX 20 MIN: CPT | Mod: GP | Performed by: PHYSICAL THERAPIST

## 2019-04-01 PROCEDURE — 97110 THERAPEUTIC EXERCISES: CPT | Mod: GP | Performed by: PHYSICAL THERAPIST

## 2019-04-01 NOTE — PROGRESS NOTES
[facility] Initial Evaluation  Subjective:  Physical Therapy Initial Evaluation   4/1/19   Precautions/Restrictions/MD instructions: PT eval and treat    Therapist Impression:   Pt is a 74 y/o male, with chronic history of left ankle/foot pain; peroneal tendonopathy. Pt presents with pain, decreased ROM/mobiltiy, poor balance and decreased strength. These impairments limit their ability to walk and go up/down stairs. Skilled PT services necessary in order to reduce impairments and improve independent function.    Subjective:   Chief Complaint: left ankle/foot pain, same problem as seen in PT last year. Pain has returned; worse with prolonged walking.   DOI/onset: 1/20/19 DOS: none  Location: left ankle (lateral)  Quality: sharp/shooting at times  Frequency: intermittent  Radiates: none  Pain scale: Rest 0/10 Activity 10/10    Sleeping: doesn't wake in night, but first steps in morning is painful in left ankle    Exacerbated by: walking, stairs  Relieved by: rest  Progression: worse   Previous Treatment: PT  Effect of prior treatment: good outcomes    PMH and/or surgical history: R shoulder, R elbow, colon resection   Imaging: MRI (last year)     Occupation: retired Job duties/hobbies: walking, walking group   Current HEP/exercise regimen: walking (would like to get back to walking 3+ miles)  Patient's goals: walk without pain   Medications: medical chart (HTN, cholesterol, diabetic meds, inhaler)   General health as reported by patient: fair/good  Return to MD: as needed   Red Flags: Diabetic Type ll      HPI                    Objective:  ANKLE:    Standing Posture: major gastroc atrophy on left compared to right;     Gait: pain on left, walking on latera side of foot on left, lacking push off on left    SL Balance:   Test future session    ROM/Strength: (* indicates patient's pain)   AROM L AROM R   Dorsiflexion -5 -1   Plantarflexion 53* 60   Inversion (prone) 20 25   Eversion (prone) 10 10        Edema:    General: none to note    Palpation: pain with resisted eversion; tender to L peroneal tendon      System    Physical Exam    General     ROS    Assessment/Plan:    Patient is a 73 year old male with left side ankle complaints.    Patient has the following significant findings with corresponding treatment plan.                Diagnosis 1:  Left ankle pain  Pain -  hot/cold therapy, US, manual therapy, splint/taping/bracing/orthotics, self management, education and home program  Decreased ROM/flexibility - manual therapy and therapeutic exercise  Decreased joint mobility - manual therapy and therapeutic exercise  Decreased strength - therapeutic exercise and therapeutic activities  Impaired balance - neuro re-education and therapeutic activities  Decreased proprioception - neuro re-education and therapeutic activities  Inflammation - cold therapy and self management/home program  Impaired gait - gait training  Impaired muscle performance - neuro re-education  Decreased function - therapeutic activities  Impaired posture - neuro re-education  Instability -  Therapeutic Activity  Therapeutic Exercise    Therapy Evaluation Codes:   1) Decision-Making    Low complexity using standardized patient assessment instrument and/or measureable assessment of functional outcome.  Cumulative Therapy Evaluation is: Low complexity.    Previous and current functional limitations:  (See Goal Flow Sheet for this information)    Short term and Long term goals: (See Goal Flow Sheet for this information)     Communication ability:  Patient appears to be able to clearly communicate and understand verbal and written communication and follow directions correctly.  Treatment Explanation - The following has been discussed with the patient:   RX ordered/plan of care  Anticipated outcomes  Possible risks and side effects  This patient would benefit from PT intervention to resume normal activities.   Rehab potential is  good.    Frequency:  2 X week, once daily  Duration:  for 3 weeks; then 1X/week for 2 weeks  Discharge Plan:  Achieve all LTG.  Independent in home treatment program.  Reach maximal therapeutic benefit.    Please refer to the daily flowsheet for treatment today, total treatment time and time spent performing 1:1 timed codes.

## 2019-04-10 ENCOUNTER — THERAPY VISIT (OUTPATIENT)
Dept: PHYSICAL THERAPY | Facility: CLINIC | Age: 74
End: 2019-04-10
Payer: COMMERCIAL

## 2019-04-10 DIAGNOSIS — M25.572 PAIN IN JOINT INVOLVING ANKLE AND FOOT, LEFT: ICD-10-CM

## 2019-04-10 PROCEDURE — 97110 THERAPEUTIC EXERCISES: CPT | Mod: GP | Performed by: PHYSICAL THERAPIST

## 2019-04-10 PROCEDURE — 97140 MANUAL THERAPY 1/> REGIONS: CPT | Mod: GP | Performed by: PHYSICAL THERAPIST

## 2019-04-12 ENCOUNTER — THERAPY VISIT (OUTPATIENT)
Dept: PHYSICAL THERAPY | Facility: CLINIC | Age: 74
End: 2019-04-12
Payer: COMMERCIAL

## 2019-04-12 DIAGNOSIS — M25.572 PAIN IN JOINT INVOLVING ANKLE AND FOOT, LEFT: ICD-10-CM

## 2019-04-12 PROCEDURE — 97140 MANUAL THERAPY 1/> REGIONS: CPT | Mod: GP | Performed by: PHYSICAL THERAPIST

## 2019-04-12 PROCEDURE — 97110 THERAPEUTIC EXERCISES: CPT | Mod: GP | Performed by: PHYSICAL THERAPIST

## 2019-04-17 ENCOUNTER — THERAPY VISIT (OUTPATIENT)
Dept: PHYSICAL THERAPY | Facility: CLINIC | Age: 74
End: 2019-04-17
Payer: COMMERCIAL

## 2019-04-17 DIAGNOSIS — M25.572 PAIN IN JOINT INVOLVING ANKLE AND FOOT, LEFT: ICD-10-CM

## 2019-04-17 PROCEDURE — 97110 THERAPEUTIC EXERCISES: CPT | Mod: GP | Performed by: PHYSICAL THERAPIST

## 2019-04-17 PROCEDURE — 97140 MANUAL THERAPY 1/> REGIONS: CPT | Mod: GP | Performed by: PHYSICAL THERAPIST

## 2019-04-19 ENCOUNTER — THERAPY VISIT (OUTPATIENT)
Dept: PHYSICAL THERAPY | Facility: CLINIC | Age: 74
End: 2019-04-19
Payer: COMMERCIAL

## 2019-04-19 DIAGNOSIS — M25.572 PAIN IN JOINT INVOLVING ANKLE AND FOOT, LEFT: ICD-10-CM

## 2019-04-19 PROCEDURE — 97110 THERAPEUTIC EXERCISES: CPT | Mod: GP | Performed by: PHYSICAL THERAPIST

## 2019-04-19 PROCEDURE — 97140 MANUAL THERAPY 1/> REGIONS: CPT | Mod: GP | Performed by: PHYSICAL THERAPIST

## 2019-04-24 ENCOUNTER — THERAPY VISIT (OUTPATIENT)
Dept: PHYSICAL THERAPY | Facility: CLINIC | Age: 74
End: 2019-04-24
Payer: COMMERCIAL

## 2019-04-24 DIAGNOSIS — M25.572 PAIN IN JOINT INVOLVING ANKLE AND FOOT, LEFT: ICD-10-CM

## 2019-04-24 PROCEDURE — 97140 MANUAL THERAPY 1/> REGIONS: CPT | Mod: GP | Performed by: PHYSICAL THERAPIST

## 2019-04-24 PROCEDURE — 97110 THERAPEUTIC EXERCISES: CPT | Mod: GP | Performed by: PHYSICAL THERAPIST

## 2019-05-01 ENCOUNTER — THERAPY VISIT (OUTPATIENT)
Dept: PHYSICAL THERAPY | Facility: CLINIC | Age: 74
End: 2019-05-01
Payer: COMMERCIAL

## 2019-05-01 DIAGNOSIS — M25.572 PAIN IN JOINT INVOLVING ANKLE AND FOOT, LEFT: ICD-10-CM

## 2019-05-01 PROCEDURE — 97140 MANUAL THERAPY 1/> REGIONS: CPT | Mod: GP | Performed by: PHYSICAL THERAPIST

## 2019-05-01 PROCEDURE — 97110 THERAPEUTIC EXERCISES: CPT | Mod: GP | Performed by: PHYSICAL THERAPIST

## 2019-05-08 ENCOUNTER — THERAPY VISIT (OUTPATIENT)
Dept: PHYSICAL THERAPY | Facility: CLINIC | Age: 74
End: 2019-05-08
Payer: COMMERCIAL

## 2019-05-08 DIAGNOSIS — M25.572 PAIN IN JOINT INVOLVING ANKLE AND FOOT, LEFT: ICD-10-CM

## 2019-05-08 PROCEDURE — 97140 MANUAL THERAPY 1/> REGIONS: CPT | Mod: GP | Performed by: PHYSICAL THERAPIST

## 2019-05-08 PROCEDURE — 97110 THERAPEUTIC EXERCISES: CPT | Mod: GP | Performed by: PHYSICAL THERAPIST

## 2019-05-08 NOTE — PROGRESS NOTES
Subjective:  HPI                    Objective:  System    Physical Exam    General     ROS    Assessment/Plan:    PROGRESS  REPORT    Progress reporting period is from 4/1/19 to 5/8/19.       SUBJECTIVE  Subjective: Pt notes that L ankle pain is getting worse. Worse this weekend, had a flare up with walking. Woke up in the night with sharp pain (last night). Taping still helps relieve some pain. Did get appointment with Dr. Elkins next Monday for further recommendations.     Current Pain level: 7/10.      Initial Pain level: 10/10.   Changes in function:  None  Adverse reaction to treatment or activity: None    OBJECTIVE  Changes noted in objective findings:  None  Objective: AROM: L ankle DF/PF= -5/55 (pain and tightness today). Very tender to palpation to left peroneal tendons; tolerated mobs and tool massage well today.      ASSESSMENT/PLAN  Updated problem list and treatment plan: Diagnosis 1:  L ankle pain; peroneal tendonosis  Pain -  home program  Decreased ROM/flexibility - manual therapy, therapeutic exercise and home program  Decreased joint mobility - manual therapy, therapeutic exercise and home program  Decreased strength - therapeutic exercise, therapeutic activities and home program  Impaired balance - neuro re-education, therapeutic activities and home program  Decreased proprioception - neuro re-education, therapeutic activities and home program  Inflammation - self management/home program  Impaired gait - gait training and home program  Impaired muscle performance - neuro re-education and home program  Decreased function - therapeutic activities and functional performance testing  STG/LTGs have been met or progress has been made towards goals:  None  Assessment of Progress: The patient's condition is unchanged.  The patient's progress has slowed.  Self Management Plans:  Patient has been instructed in a home treatment program.  I have re-evaluated this patient and find that the nature, scope,  duration and intensity of the therapy is appropriate for the medical condition of the patient.  Jaylen continues to require the following intervention to meet STG and LTG's:  PT intervention is no longer required to meet STG/LTG.    Recommendations:  This patient would benefit from further evaluation, will follow up with Dr. Elkins next week.    Please refer to the daily flowsheet for treatment today, total treatment time and time spent performing 1:1 timed codes.

## 2019-05-12 DIAGNOSIS — I10 ESSENTIAL HYPERTENSION: ICD-10-CM

## 2019-05-13 ENCOUNTER — OFFICE VISIT (OUTPATIENT)
Dept: PODIATRY | Facility: CLINIC | Age: 74
End: 2019-05-13
Payer: COMMERCIAL

## 2019-05-13 VITALS
WEIGHT: 181 LBS | DIASTOLIC BLOOD PRESSURE: 60 MMHG | HEIGHT: 71 IN | SYSTOLIC BLOOD PRESSURE: 120 MMHG | BODY MASS INDEX: 25.34 KG/M2

## 2019-05-13 DIAGNOSIS — M76.72 PERONEAL TENDONITIS, LEFT: ICD-10-CM

## 2019-05-13 DIAGNOSIS — M79.605 LEFT LEG PAIN: Primary | ICD-10-CM

## 2019-05-13 PROCEDURE — 99213 OFFICE O/P EST LOW 20 MIN: CPT | Performed by: PODIATRIST

## 2019-05-13 RX ORDER — LOSARTAN POTASSIUM AND HYDROCHLOROTHIAZIDE 25; 100 MG/1; MG/1
TABLET ORAL
Qty: 30 TABLET | Refills: 0 | Status: SHIPPED | OUTPATIENT
Start: 2019-05-13 | End: 2019-05-29

## 2019-05-13 ASSESSMENT — MIFFLIN-ST. JEOR: SCORE: 1593.14

## 2019-05-13 NOTE — LETTER
"    5/13/2019         RE: Jaylen De La Vega  [address]  [place] [address]        Dear Colleague,    Thank you for referring your patient, Jaylen De La Vega, to the [facility] [place]. Please see a copy of my visit note below.    Podiatry / Foot and Ankle Surgery Progress Note    May 13, 2019    Subject: Patient was seen for follow up on left ankle pain. Notes that PT did not really help. Has not been wearing ankle brace as he can not reach his foot much. Pain mostly when walking long periods of time. Notes it is \"electrical\" type pain.     Objective:  Vitals: /60   Ht 1.803 m (5' 11\")   Wt 82.1 kg (181 lb)   BMI 25.24 kg/m     BMI= Body mass index is 25.24 kg/m .     AC1: 7.6 (3/2019)    General:  Patient is alert and orientated.  NAD     Dermatologic: Skin is intact to both lower extremities without significant lesions, rash or abrasion.  No paronychia or evidence of soft tissue infection is noted.     Vascular: DP & PT pulses are intact & regular bilaterally.  No significant edema or varicosities noted.  CFT and skin temperature is normal to both lower extremities.     Neurologic: Lower extremity sensation is intact to light touch.  No evidence of weakness or contracture in the lower extremities.  No evidence of neuropathy.     Musculoskeletal: Patient is ambulatory without assistive device or brace.  continued Pain on palpation of the left peroneal tendon with palpation behind fibula and with eversion. Increase arch height.      ASSESSMENT:    Chronic pain of left ankle  Peroneal tendinitis of left lower extremity  Pes cavus     PLAN:  Reviewed patient's chart in epic. Given that it is still sore and that he is describing his pain as electrical and notes that he has back issues and previous MRI was negative, will order EMG. Will call with results.     Ngoc Elkins DPM, Podiatry/Foot and Ankle Surgery    Weight management plan: Patient was referred to their PCP to discuss a diet and " exercise plan.    Recommended to Jaylen De La Vega to follow up with Primary Care provider regarding elevated blood pressure.      Again, thank you for allowing me to participate in the care of your patient.        Sincerely,        Ngoc Elkins DPM, Podiatry/Foot and Ankle Surgery

## 2019-05-13 NOTE — TELEPHONE ENCOUNTER
"Requested Prescriptions   Pending Prescriptions Disp Refills     losartan-hydrochlorothiazide (HYZAAR) 100-25 MG tablet [Pharmacy Med Name: LOSARTAN-HCTZ 100-25 MG TAB]  Last Written Prescription Date:  5/4/2018  Last Fill Quantity: 90 tablet,  # refills: 3   Last office visit: 9/24/2018 with prescribing provider:  Bayron     Future Office Visit:       90 tablet 0     Sig: TAKE 1 TABLET BY MOUTH EVERY DAY       Angiotensin-II Receptors Failed - 5/12/2019  9:31 AM        Failed - Normal serum creatinine on file in past 12 months     Recent Labs   Lab Test 04/12/18  0850   CR 1.08             Failed - Normal serum potassium on file in past 12 months     Recent Labs   Lab Test 04/12/18  0850   POTASSIUM 3.7              Passed - Blood pressure under 140/90 in past 12 months     BP Readings from Last 3 Encounters:   05/13/19 120/60   01/11/19 120/70   09/24/18 100/64             Passed - Recent (12 mo) or future (30 days) visit within the authorizing provider's specialty     Patient had office visit in the last 12 months or has a visit in the next 30 days with authorizing provider or within the authorizing provider's specialty.  See \"Patient Info\" tab in inbasket, or \"Choose Columns\" in Meds & Orders section of the refill encounter.              Passed - Medication is active on med list        Passed - Patient is age 18 or older        "

## 2019-05-13 NOTE — TELEPHONE ENCOUNTER
Medication is being filled for 1 time refill only due to:  Patient needs to be seen because due for OV and labs. Of note patient was due in March for diabetic follow up, also due for labs. Please call patient to schedule follow up. Thank you, Kathleen Faust R.N.

## 2019-05-13 NOTE — PROGRESS NOTES
"Podiatry / Foot and Ankle Surgery Progress Note    May 13, 2019    Subject: Patient was seen for follow up on left ankle pain. Notes that PT did not really help. Has not been wearing ankle brace as he can not reach his foot much. Pain mostly when walking long periods of time. Notes it is \"electrical\" type pain.     Objective:  Vitals: /60   Ht 1.803 m (5' 11\")   Wt 82.1 kg (181 lb)   BMI 25.24 kg/m    BMI= Body mass index is 25.24 kg/m .     AC1: 7.6 (3/2019)    General:  Patient is alert and orientated.  NAD     Dermatologic: Skin is intact to both lower extremities without significant lesions, rash or abrasion.  No paronychia or evidence of soft tissue infection is noted.     Vascular: DP & PT pulses are intact & regular bilaterally.  No significant edema or varicosities noted.  CFT and skin temperature is normal to both lower extremities.     Neurologic: Lower extremity sensation is intact to light touch.  No evidence of weakness or contracture in the lower extremities.  No evidence of neuropathy.     Musculoskeletal: Patient is ambulatory without assistive device or brace.  continued Pain on palpation of the left peroneal tendon with palpation behind fibula and with eversion. Increase arch height.      ASSESSMENT:    Chronic pain of left ankle  Peroneal tendinitis of left lower extremity  Pes cavus     PLAN:  Reviewed patient's chart in epic. Given that it is still sore and that he is describing his pain as electrical and notes that he has back issues and previous MRI was negative, will order EMG. Will call with results.     Ngoc Elkins DPM, Podiatry/Foot and Ankle Surgery    Weight management plan: Patient was referred to their PCP to discuss a diet and exercise plan.    Recommended to Jaylen De La Vega to follow up with Primary Care provider regarding elevated blood pressure.    "

## 2019-05-13 NOTE — PATIENT INSTRUCTIONS
Thank you for choosing [facility]!    DR. QUICK'S CLINIC SCHEDULE  MONDAY AM - [place] TUESDAY - [place]   [address] [address] [place] [address] [place] [address]   [phone] / [phone] / [phone]       WEDNESDAY - [place] FRIDAY AM - [facility]   [address]   [place] [address] [place] [address]   [phone] / [phone]       FRIDAY PM - [place] SCHEDULE SURGERY: [phone]   [address] BILLING QUESTIONS: [phone]   [place] [address] AFTER HOURS: [phone] / [phone] APPOINTMENTS: [phone]     Consumer Price Line (CPL) [phone]         For EMG (Motor Nerve Testing):  Please call [facility]:  [phone], option #2        **We will call with EMG Results**          BODY WEIGHT AND YOUR FEET  The following information is included in the after visit summary for all patients. Body weight can be a sensitive issue to discuss in clinic, but we think the following information is very important. Although we focus on the feet and ankles, we do support the overall health of our patients.     Many things can cause foot and ankle problems. Foot structure, activity level, foot mechanics and injuries are common causes of pain. One very important issue that often goes unmentioned, is body weight. Extra weight can cause increased stress on muscles, ligaments, bones and tendons. Sometimes just a few extra pounds is all it takes to put one over her/his threshold. Without reducing that stress, it can be difficult to alleviate pain. As Foot & Ankle specialists, our job is addressing the lower extremity problem and possible causes. Regarding extra body weight, we encourage patients to discuss diet and weight management plans with their primary care doctors. It is this team approach that gives you the best opportunity  for pain relief and getting you back on your feet.      [facility] has a Comprehensive Weight Management Program. This program includes counseling, education, non-surgical and surgical approaches to weight loss. If you are interested in learning more either talk to you primary care provider or call [phone].

## 2019-05-17 NOTE — TELEPHONE ENCOUNTER
Called pt at [phone] and spoke with pt and rashida appt for 6/26/2019.  Shagufta Wilson

## 2019-05-24 DIAGNOSIS — E11.9 TYPE 2 DIABETES MELLITUS WITHOUT COMPLICATION, WITHOUT LONG-TERM CURRENT USE OF INSULIN (H): ICD-10-CM

## 2019-05-24 RX ORDER — METFORMIN HCL 500 MG
500 TABLET, EXTENDED RELEASE 24 HR ORAL 2 TIMES DAILY WITH MEALS
Qty: 120 TABLET | Refills: 0 | Status: SHIPPED | OUTPATIENT
Start: 2019-05-24 | End: 2019-06-26

## 2019-05-24 NOTE — TELEPHONE ENCOUNTER
"Requested Prescriptions   Pending Prescriptions Disp Refills     metFORMIN (GLUCOPHAGE-XR) 500 MG 24 hr tablet [Pharmacy Med Name: METFORMIN HCL  MG TABLET]  Last Written Prescription Date:  1/18/2019  Last Fill Quantity: 90 tablet,  # refills: 1   Last office visit: 9/24/2018 with prescribing provider:  Bayron     Future Office Visit:   Next 5 appointments (look out 90 days)    Jun 26, 2019 11:00 AM CDT  Office Visit with Tj Verma Jr., MD  [facility] ([facility]) [address]  [facility] [address] [phone]            90 tablet 1     Sig: TAKE 1 TABLET (500 MG) BY MOUTH 2 TIMES DAILY (WITH MEALS)       Biguanide Agents Failed - 5/24/2019  1:47 AM        Failed - Patient's CR is NOT>1.4 OR Patient's EGFR is NOT<45 within past 12 mos.     Recent Labs   Lab Test 04/12/18  0850   GFRESTIMATED 67   GFRESTBLACK 81       Recent Labs   Lab Test 04/12/18  0850   CR 1.08             Failed - Recent (6 mo) or future (30 days) visit within the authorizing provider's specialty     Patient had office visit in the last 6 months or has a visit in the next 30 days with authorizing provider or within the authorizing provider's specialty.  See \"Patient Info\" tab in inbasket, or \"Choose Columns\" in Meds & Orders section of the refill encounter.            Passed - Blood pressure less than 140/90 in past 6 months     BP Readings from Last 3 Encounters:   05/13/19 120/60   01/11/19 120/70   09/24/18 100/64                 Passed - Patient has documented LDL within the past 12 mos.     Recent Labs   Lab Test 09/24/18  1053   LDL 66             Passed - Patient has had a Microalbumin in the past 15 mos.     Recent Labs   Lab Test 09/24/18  1053   MICROL 13   UMALCR 11.32             Passed - Patient is age 10 or older        Passed - Patient has documented A1c within the specified period of time.     If HgbA1C is 8 or greater, it needs to be on file within the past 3 months.  If less " than 8, must be on file within the past 6 months.     Recent Labs   Lab Test 03/20/19  0850   A1C 7.6*             Passed - Patient does NOT have a diagnosis of CHF.        Passed - Medication is active on med list

## 2019-05-24 NOTE — TELEPHONE ENCOUNTER
A 60 day supply is given, patient is due for an office visit.  Patient has appointment scheduled for 6/26/2019.    LINDA Corea, RN  [employer] [facility]

## 2019-05-28 DIAGNOSIS — I10 ESSENTIAL HYPERTENSION: ICD-10-CM

## 2019-05-28 NOTE — TELEPHONE ENCOUNTER
"Requested Prescriptions   Pending Prescriptions Disp Refills     losartan-hydrochlorothiazide (HYZAAR) 100-25 MG tablet  Pharmacy in [place], [place].  Requesting a 90-Day Supply  Last Written Prescription Date:  5/13/2019  Last Fill Quantity: 30 tablet,  # refills: 0   Last office visit: 9/24/2018 with prescribing provider:  Bayron     Future Office Visit:   Next 5 appointments (look out 90 days)    Jun 26, 2019 11:00 AM CDT  Office Visit with Tj Verma Jr., MD  [facility] ([facility]) [address]  [facility] [id number] [phone]            30 tablet 0     Sig: Take 1 tablet by mouth daily       Angiotensin-II Receptors Failed - 5/28/2019 10:56 AM        Failed - Normal serum creatinine on file in past 12 months     Recent Labs   Lab Test 04/12/18  0850   CR 1.08             Failed - Normal serum potassium on file in past 12 months     Recent Labs   Lab Test 04/12/18  0850   POTASSIUM 3.7              Passed - Blood pressure under 140/90 in past 12 months     BP Readings from Last 3 Encounters:   05/13/19 120/60   01/11/19 120/70   09/24/18 100/64             Passed - Recent (12 mo) or future (30 days) visit within the authorizing provider's specialty     Patient had office visit in the last 12 months or has a visit in the next 30 days with authorizing provider or within the authorizing provider's specialty.  See \"Patient Info\" tab in inbasket, or \"Choose Columns\" in Meds & Orders section of the refill encounter.              Passed - Medication is active on med list        Passed - Patient is age 18 or older        "

## 2019-05-29 RX ORDER — LOSARTAN POTASSIUM AND HYDROCHLOROTHIAZIDE 25; 100 MG/1; MG/1
1 TABLET ORAL DAILY
Qty: 30 TABLET | Refills: 0 | Status: SHIPPED | OUTPATIENT
Start: 2019-05-29 | End: 2019-06-26

## 2019-05-29 NOTE — TELEPHONE ENCOUNTER
Prescription approved per [facility] RN Refill Protocol.  Patient.should have some RX left but this should give enough until next appointment near end of June. Kathleen Faust R.N.

## 2019-05-31 ENCOUNTER — TRANSFERRED RECORDS (OUTPATIENT)
Dept: HEALTH INFORMATION MANAGEMENT | Facility: CLINIC | Age: 74
End: 2019-05-31

## 2019-06-03 ENCOUNTER — TRANSFERRED RECORDS (OUTPATIENT)
Dept: HEALTH INFORMATION MANAGEMENT | Facility: CLINIC | Age: 74
End: 2019-06-03

## 2019-06-12 ENCOUNTER — TELEPHONE (OUTPATIENT)
Dept: PODIATRY | Facility: CLINIC | Age: 74
End: 2019-06-12

## 2019-06-12 NOTE — TELEPHONE ENCOUNTER
EMG is abnormal. Shows some neuropathy as well as issues with the nerves in the lower back, radiculopathy of L5 and S1.      Recommend follow up with primary care doctor to discuss gabapentin or lyrica for nerve pain.  May also recommend follow up with back doctor due to radiculopathies.     Please let patient know.     Ngoc Elkins DPM

## 2019-06-14 NOTE — TELEPHONE ENCOUNTER
Read results to pt, pt verbalized understanding. No concerns, patient will f/u with PCP.        Rodney Carter on 6/14/2019 at 4:56 PM

## 2019-06-23 DIAGNOSIS — E11.9 TYPE 2 DIABETES MELLITUS WITHOUT COMPLICATION, WITHOUT LONG-TERM CURRENT USE OF INSULIN (H): ICD-10-CM

## 2019-06-24 RX ORDER — GLIMEPIRIDE 4 MG/1
4 TABLET ORAL
Qty: 90 TABLET | Refills: 0 | Status: SHIPPED | OUTPATIENT
Start: 2019-06-24 | End: 2019-06-26

## 2019-06-24 NOTE — TELEPHONE ENCOUNTER
"Requested Prescriptions   Pending Prescriptions Disp Refills     glimepiride (AMARYL) 4 MG tablet [Pharmacy Med Name: GLIMEPIRIDE 4 MG TABLET]  Last Written Prescription Date:  3/11/2019  Last Fill Quantity: 90 tablet,  # refills: 0   Last office visit: 9/24/2018 with prescribing provider:  Bayron     Future Office Visit:   Next 5 appointments (look out 90 days)    Jun 26, 2019 11:00 AM CDT  Office Visit with Tj Verma Jr., MD  [facility] ([facility]) [address]  [facility] [address] [phone]            90 tablet 0     Sig: TAKE 1 TABLET (4 MG) BY MOUTH EVERY MORNING (BEFORE BREAKFAST)       Sulfonylurea Agents Failed - 6/23/2019  1:33 PM        Failed - Patient has a recent creatinine (normal) within the past 12 mos.     Recent Labs   Lab Test 04/12/18  0850   CR 1.08             Passed - Blood pressure less than 140/90 in past 6 months     BP Readings from Last 3 Encounters:   05/13/19 120/60   01/11/19 120/70   09/24/18 100/64             Passed - Patient has documented LDL within the past 12 mos.     Recent Labs   Lab Test 09/24/18  1053   LDL 66             Passed - Patient has had a Microalbumin in the past 15 mos.     Recent Labs   Lab Test 09/24/18  1053   MICROL 13   UMALCR 11.32             Passed - Patient has documented A1c within the specified period of time.     If HgbA1C is 8 or greater, it needs to be on file within the past 3 months.  If less than 8, must be on file within the past 6 months.     Recent Labs   Lab Test 03/20/19  0850   A1C 7.6*             Passed - Medication is active on med list        Passed - Patient is age 18 or older        Passed - Recent (6 mo) or future (30 days) visit within the authorizing provider's specialty     Patient had office visit in the last 6 months or has a visit in the next 30 days with authorizing provider or within the authorizing provider's specialty.  See \"Patient Info\" tab in inbasket, or \"Choose Columns\" in " Meds & Orders section of the refill encounter.

## 2019-06-24 NOTE — TELEPHONE ENCOUNTER
Prescription approved per [facility] Refill Protocol.      Ingrid Jimenez RN, BSN  [facility]

## 2019-06-26 ENCOUNTER — OFFICE VISIT (OUTPATIENT)
Dept: FAMILY MEDICINE | Facility: CLINIC | Age: 74
End: 2019-06-26
Payer: COMMERCIAL

## 2019-06-26 ENCOUNTER — TELEPHONE (OUTPATIENT)
Dept: FAMILY MEDICINE | Facility: CLINIC | Age: 74
End: 2019-06-26

## 2019-06-26 VITALS
SYSTOLIC BLOOD PRESSURE: 118 MMHG | WEIGHT: 190 LBS | HEIGHT: 71 IN | DIASTOLIC BLOOD PRESSURE: 70 MMHG | BODY MASS INDEX: 26.6 KG/M2 | HEART RATE: 89 BPM | OXYGEN SATURATION: 96 % | TEMPERATURE: 97.7 F

## 2019-06-26 DIAGNOSIS — M51.379 DEGENERATION OF LUMBAR OR LUMBOSACRAL INTERVERTEBRAL DISC: ICD-10-CM

## 2019-06-26 DIAGNOSIS — E11.9 TYPE 2 DIABETES MELLITUS WITHOUT COMPLICATION, WITHOUT LONG-TERM CURRENT USE OF INSULIN (H): Primary | ICD-10-CM

## 2019-06-26 DIAGNOSIS — R01.1 UNDIAGNOSED CARDIAC MURMURS: ICD-10-CM

## 2019-06-26 DIAGNOSIS — I10 ESSENTIAL HYPERTENSION: ICD-10-CM

## 2019-06-26 DIAGNOSIS — J44.9 CHRONIC OBSTRUCTIVE PULMONARY DISEASE, UNSPECIFIED COPD TYPE (H): ICD-10-CM

## 2019-06-26 DIAGNOSIS — I10 ESSENTIAL HYPERTENSION: Primary | ICD-10-CM

## 2019-06-26 LAB — HBA1C MFR BLD: 8.1 % (ref 0–5.6)

## 2019-06-26 PROCEDURE — 83036 HEMOGLOBIN GLYCOSYLATED A1C: CPT | Performed by: FAMILY MEDICINE

## 2019-06-26 PROCEDURE — 36415 COLL VENOUS BLD VENIPUNCTURE: CPT | Performed by: FAMILY MEDICINE

## 2019-06-26 PROCEDURE — 80048 BASIC METABOLIC PNL TOTAL CA: CPT | Performed by: FAMILY MEDICINE

## 2019-06-26 PROCEDURE — 99207 C FOOT EXAM  NO CHARGE: CPT | Mod: 25 | Performed by: FAMILY MEDICINE

## 2019-06-26 PROCEDURE — 82043 UR ALBUMIN QUANTITATIVE: CPT | Performed by: FAMILY MEDICINE

## 2019-06-26 PROCEDURE — 84443 ASSAY THYROID STIM HORMONE: CPT | Performed by: FAMILY MEDICINE

## 2019-06-26 PROCEDURE — 99214 OFFICE O/P EST MOD 30 MIN: CPT | Performed by: FAMILY MEDICINE

## 2019-06-26 PROCEDURE — 80061 LIPID PANEL: CPT | Performed by: FAMILY MEDICINE

## 2019-06-26 RX ORDER — HYDROCHLOROTHIAZIDE 25 MG/1
25 TABLET ORAL DAILY
Qty: 90 TABLET | Refills: 1 | Status: SHIPPED | OUTPATIENT
Start: 2019-06-26 | End: 2019-12-15

## 2019-06-26 RX ORDER — LOSARTAN POTASSIUM 100 MG/1
100 TABLET ORAL DAILY
Qty: 90 TABLET | Refills: 1 | Status: SHIPPED | OUTPATIENT
Start: 2019-06-26 | End: 2019-12-23

## 2019-06-26 RX ORDER — LOSARTAN POTASSIUM AND HYDROCHLOROTHIAZIDE 25; 100 MG/1; MG/1
1 TABLET ORAL DAILY
Qty: 90 TABLET | Refills: 1 | Status: SHIPPED | OUTPATIENT
Start: 2019-06-26 | End: 2019-06-26

## 2019-06-26 RX ORDER — METFORMIN HCL 500 MG
500 TABLET, EXTENDED RELEASE 24 HR ORAL 2 TIMES DAILY WITH MEALS
Qty: 180 TABLET | Refills: 1 | Status: SHIPPED | OUTPATIENT
Start: 2019-06-26 | End: 2019-12-30

## 2019-06-26 RX ORDER — GLIMEPIRIDE 4 MG/1
4 TABLET ORAL
Qty: 90 TABLET | Refills: 1 | Status: SHIPPED | OUTPATIENT
Start: 2019-06-26 | End: 2019-12-30

## 2019-06-26 ASSESSMENT — MIFFLIN-ST. JEOR: SCORE: 1633.96

## 2019-06-26 NOTE — NURSING NOTE
Please abstract the following data from this visit with this patient into the appropriate field in Epic:    Tests that can be patient reported without a hard copy:    Eye exam with ophthalmology on this date: 2/2019 [facility]      Other Tests found in the patient's chart through Chart Review/Care Everywhere:        Note to [employer]: If this section is blank, no results were found via Chart Review/Care Everywhere.

## 2019-06-26 NOTE — TELEPHONE ENCOUNTER
Chart reviewed.  Rx sent to pt's preferred pharmacy.       [facility] [place]  [facility] [address] [place] [facility] [address] [address]    Tj Verma MD

## 2019-06-26 NOTE — TELEPHONE ENCOUNTER
Rcvd fax from [facility] in [place] stating the combination drug Losartan-hydrochlorothiazide is on back order.  Asking that we send over rxs for the meds separately.  Shagufta Wilson

## 2019-06-26 NOTE — PROGRESS NOTES
Subjective     Jaylen De La Vega is a 73 year old male who presents to clinic today for the following health issues:    HPI   Diabetes Follow-up      How often are you checking your blood sugar? A few times a week average of 125    What time of day are you checking your blood sugars (select all that apply)?  Before and after meals just depends on day     Have you had any blood sugars above 200?  No    Have you had any blood sugars below 70?  No but close 74 - low after walking but now will have a snack before walking     What symptoms do you notice when your blood sugar is low?  Lethargy and Other: nausea     What concerns do you have today about your diabetes? Metformin side effects      Do you have any of these symptoms? (Select all that apply)  Numbness in feet and Burning in feet- does see Dr Elkins for tendonitis  Has had EMG (dx with nerve damage/atrophy)  - needs referral for spine surgeon       Have you had a diabetic eye exam in the last 12 months? Yes- Date of last eye exam: 2/2019 [facility]     Diabetes Management Resources    Hyperlipidemia Follow-Up      Are you having any of the following symptoms? (Select all that apply)  Pain in calves when walking 1-2 blocks -due to tendonitis     Are you regularly taking any medication or supplement to lower your cholesterol?   Yes-      Are you having muscle aches or other side effects that you think could be caused by your cholesterol lowering medication?  No    Hypertension Follow-up      Do you check your blood pressure regularly outside of the clinic? Yes Grocery store and home    Are you following a low salt diet? Yes    Are your blood pressures ever more than 140 on the top number (systolic) OR more   than 90 on the bottom number (diastolic), for example 140/90? No    BP Readings from Last 2 Encounters:   06/26/19 118/70   05/13/19 120/60     Hemoglobin A1C (%)   Date Value   06/26/2019 8.1 (H)   03/20/2019 7.6 (H)     LDL Cholesterol Calculated (mg/dL)   Date  Value   09/24/2018 66   10/14/2017 78       Amount of exercise or physical activity:Walking (about 5 miles per day) and some light weight lifting (5lbs)      Problems taking medications regularly: No    Medication side effects: metformin issues     Diet: low salt and diabetic      Diabetes- Pt reports his sugars have been in the 150s when he wakes up in the mornings. He sugars have been in the 70s after he exercises in the afternoon. He has since eaten before exercising and his sugars have been around 110 when he gets home and checks. He has noticed increased gassiness and bloating and thinks this may be due to his Metformin. He does not think he wants to adjust his medications. Pt did recently have an eye exam.    Peripheral neuropathy- EMG showed changes consistent with neuropathy as well as issues with the nerves in his lower back, radiculopathy of L% and S1. Pt has noticed his left calf is considerably smaller than his right. Pt did have an MRI of his back 10 years ago that did show L5-S1: Mild left disc herniation with mass effect on the S1 nerve root, and L4-L5: Mild-moderate central disc extrusion. Mild central stenosis. Pt did have a steroid injection in his back that was effective and thinks this was done over 10 years ago.    Sleep- Pt takes SOMA to help him sleep at night, but seldomly takes these. His last refill was 5 years ago and still has half the prescription left at home.      Reviewed and updated as needed this visit by Provider       Review of Systems    ROS: 10 point ROS neg other than the symptoms noted above in the HPI.     This document serves as a record of the services and decisions personally performed and made by Tj Verma MD. It was created on his behalf by Kalpesh Arango, a trained medical scribe. The creation of this document is based on the provider's statements to the medical scribe.  Kalpesh Arango 11:21 AM June 26, 2019      Objective    /70   Pulse 89   Temp 97.7  " F (36.5  C) (Oral)   Ht 1.803 m (5' 11\")   Wt 86.2 kg (190 lb)   SpO2 96%   BMI 26.50 kg/m    Body mass index is 26.5 kg/m .  Physical Exam  GENERAL: healthy, alert and no distress  NECK: no adenopathy, no asymmetry, masses, or scars and thyroid normal to palpation  RESP: lungs clear to auscultation - no rales, rhonchi or wheezes  CV: regular rate and rhythm, normal S1 S2, no S3 or S4, no click or rub, no peripheral edema and peripheral pulses strong. 2/6 systolic murmur heard best at the right upper sternal border, without radiation.  MS: no gross musculoskeletal defects noted, no edema  SKIN: no suspicious lesions or rashes  NEURO: Normal strength and tone, mentation intact and speech normal  PSYCH: mentation appears normal, affect normal/bright  Diabetic foot exam: normal DP and PT pulses, no trophic changes or ulcerative lesions, normal sensory exam and normal monofilament exam, except for findings noted on diabetic foot graphical image: R1, L2, L3.            Diagnostic Test Results:  Labs reviewed in Epic  Results for orders placed or performed in visit on 06/26/19 (from the past 24 hour(s))   Hemoglobin A1c   Result Value Ref Range    Hemoglobin A1C 8.1 (H) 0 - 5.6 %         Assessment & Plan      (E11.9) Type 2 diabetes mellitus without complication, without long-term current use of insulin (H)  (primary encounter diagnosis)  Comment: Will recheck his A1C today- this came back at 8.1 which is consistent for him. I did advise pt though that I would like to see this under 8.0. Will have him continue with his diabetic medications as listed below and will add sitagliptin (Januvia) 25 mg daily. Will also recheck his kidney function and lipid panel and have him continue with his simvastatin and ASA. Encouraged frequent exercise and healthy diet to help maintain healthy cholesterol levels as well as improve his blood sugars. Lastly, I recommended we have him start taking fiber supplements to help with his " bloating and gassiness; pt was in agreement with this and will purchase OTC fiber supplements.  Plan: PAF COMPLETED, Hemoglobin A1c, Basic metabolic         panel  (Ca, Cl, CO2, Creat, Gluc, K, Na, BUN),         C FOOT EXAM  NO CHARGE, Albumin Random Urine Quantitative with Creat Ratio, Lipid panel         reflex to direct LDL Non-fasting, TSH with free        T4 reflex, metFORMIN (GLUCOPHAGE-XR) 500 MG 24 hr tablet, glimepiride (AMARYL) 4 MG tablet        Follow up based on labs and in 3 months for diabetes recheck.    (J44.9) Chronic obstructive pulmonary disease, unspecified COPD type (H)  Comment: This remains stable. Will have pt continue to use his albuterol inhaler as needed and his umeclidinium inhaler on a daily basis.  Plan: Follow up if needed.    (M51.37) Degeneration of lumbar or lumbosacral intervertebral disc  Comment: MRI of his back 10 years ago did show L5-S1 mild left disc herniation with mass effect on the S1 nerve, as well as L4-L5 mild moderate central disc extrusion with mild central stenosis. Advised pt that we should start with a repeat MRI of his back given his last one was 10 years ago. Then would consider additional epidural steroid injection. However, reassured pt that I do not see the need to have him see a spinal surgeon at this time and will focus on conservative measures first. If steroid injections do not prove to be effective, then may consider referring him to a spine surgeon. However, if he is able to get pain relief from a future epidural steroid injection, would then consider repeating these as long as they are effective.  Plan: MR Lumbar Spine w/o Contrast        Follow up based on results.    (I10) Essential hypertension  Comment: His BP was good today; will refill his losartan-hydrochlorothiazide.  Plan: losartan-hydrochlorothiazide (HYZAAR) 100-25 MG        tablet        Follow up if needed.    (R01.1) Undiagnosed cardiac murmurs  Comment: Pt did have a Lexiscan stress test  "7 years ago that was normal for the most part and did not show the presence of a murmur. However, given the presence of his systolic murmur today, I recommended we do an echocardiogram to evaluate his aortic valve in greater detail.  Plan: Echocardiogram Complete        Follow up based on echocardiogram results.    BMI:   Estimated body mass index is 26.5 kg/m  as calculated from the following:    Height as of this encounter: 1.803 m (5' 11\").    Weight as of this encounter: 86.2 kg (190 lb).     See Patient Instructions    Return in about 6 months (around 12/26/2019) for Diabetes Recheck.     The information in this document, created by the medical scribe for me, accurately reflects the services I personally performed and the decisions made by me. I have reviewed and approved this document for accuracy prior to leaving the patient care area.  June 26, 2019 11:21 AM    Tj Verma Jr, MD  [facility]      "

## 2019-06-26 NOTE — Clinical Note
Please abstract the following data from this visit with this patient into the appropriate field in Epic:Tests that can be patient reported without a hard copy:[facility] with ophthal[facility] on this date[facility] Tests found in the patient's chart through Chart Review/Care Everywhere:{Abstract Quality List (Optional):314351}Note to Abstraction: If this section is blank, no results were found via Chart Review/Care Everywhere.

## 2019-06-26 NOTE — PATIENT INSTRUCTIONS
Fiber supplements: MiraLax, Metamucil, Citrucel, Benefiber.  I like the stuff you mix with water and drink.

## 2019-06-27 ENCOUNTER — HOSPITAL ENCOUNTER (OUTPATIENT)
Dept: MRI IMAGING | Facility: CLINIC | Age: 74
Discharge: HOME | End: 2019-06-27
Attending: FAMILY MEDICINE | Admitting: FAMILY MEDICINE
Payer: COMMERCIAL

## 2019-06-27 DIAGNOSIS — M51.379 DEGENERATION OF LUMBAR OR LUMBOSACRAL INTERVERTEBRAL DISC: ICD-10-CM

## 2019-06-27 LAB
ANION GAP SERPL CALCULATED.3IONS-SCNC: 10 MMOL/L (ref 3–14)
BUN SERPL-MCNC: 24 MG/DL (ref 7–30)
CALCIUM SERPL-MCNC: 10.1 MG/DL (ref 8.5–10.1)
CHLORIDE SERPL-SCNC: 103 MMOL/L (ref 94–109)
CHOLEST SERPL-MCNC: 159 MG/DL
CO2 SERPL-SCNC: 26 MMOL/L (ref 20–32)
CREAT SERPL-MCNC: 1.15 MG/DL (ref 0.66–1.25)
CREAT UR-MCNC: 94 MG/DL
GFR SERPL CREATININE-BSD FRML MDRD: 63 ML/MIN/{1.73_M2}
GLUCOSE SERPL-MCNC: 128 MG/DL (ref 70–99)
HDLC SERPL-MCNC: 43 MG/DL
LDLC SERPL CALC-MCNC: 83 MG/DL
MICROALBUMIN UR-MCNC: 22 MG/L
MICROALBUMIN/CREAT UR: 23.28 MG/G CR (ref 0–17)
NONHDLC SERPL-MCNC: 116 MG/DL
POTASSIUM SERPL-SCNC: 4.1 MMOL/L (ref 3.4–5.3)
SODIUM SERPL-SCNC: 139 MMOL/L (ref 133–144)
TRIGL SERPL-MCNC: 167 MG/DL
TSH SERPL DL<=0.005 MIU/L-ACNC: 1.83 MU/L (ref 0.4–4)

## 2019-06-27 PROCEDURE — 72148 MRI LUMBAR SPINE W/O DYE: CPT

## 2019-06-27 NOTE — RESULT ENCOUNTER NOTE
Mr. De La Vega,    -A1C test (average blood sugar the last 2-3 months) is above your goal.   ADVISE: making a diabetic followup appointment in 12 weeks. Please check and record your blood sugars at least 2 times daily for 1 week prior to your appointment and bring for review.  I've also started you on a third diabetes medication called sitagliptin that you'll take once daily.  We do have the option of increasing your metformin from 500 mg twice daily to 1000 mg twice daily instead of adding the sitagliptin, but I seem to recall you had some side effects previously at this higher dose (diarrhea as I recall).   Also, you should recheck your A1C in 3 months.    If you have further questions about the interpretation of your labs, [email or web address] is a good website to check out for further information.    Please contact the clinic if you have additional questions.  Thank you.    Sincerely,    Tj Verma MD

## 2019-06-28 DIAGNOSIS — M51.379 DEGENERATION OF LUMBAR OR LUMBOSACRAL INTERVERTEBRAL DISC: Primary | ICD-10-CM

## 2019-06-28 NOTE — RESULT ENCOUNTER NOTE
Mr. De La Vega,    Your MRI shows that the arthritis between the L5 & S1 vertebrae has worsened since your last MRI in 2009.  It also shows that the left S1 nerve root may be getting pinched just like the EMG told us.  I think our first step to treat this since you're having symptoms is to do an epidural steroid injection as we discussed this week in clinic.  I've referred you to our pain clinic at the specialty care center at [facility].  They can do an injection and should call you early next week to set this up.  Let's see how the injection improves things.  If it does nothing and your pain continues to trouble you we could consider either another injection or a referral to a spine surgeon.    If you have further questions about the interpretation of your labs, [email or web address] is a good website to check out for further information.    Please contact the clinic if you have additional questions.  Thank you.    Sincerely,    Tj Verma MD

## 2019-06-28 NOTE — RESULT ENCOUNTER NOTE
Mr. De La Vega,    -Cholesterol levels are at your goal levels.  ADVISE: continuing your medication, a regular exercise program with at least 150 minutes of aerobic exercise per week, and eating a low saturated fat/low carbohydrate diet.  Also, you should recheck this fasting cholesterol panel in 12 months.  -Kidney function (GFR) is normal.  -Sodium is normal.  -Potassium is normal.  -Calcium is normal.  -Glucose is elevated due to your diabetes.  -TSH (thyroid stimulating hormone) level is normal which indicates normal thyroid function.  -Microalbumin (urine protein) level is elevated. This is suggestive of early damage to your kidneys from high blood pressure and diabetes.  ADVISE: avoiding anti-inflamatory agents such as ibuprofen (Advil, Motrin) or naproxen (Aleve) as much as possible, keeping your blood pressure in a normal range, and continuing your medication (losartan) that helps protect your kidneys.  Also, this should be rechecked in 1 year.     If you have further questions about the interpretation of your labs, [email or web address] is a good website to check out for further information.    Please contact the clinic if you have additional questions.  Thank you.    Sincerely,    Tj Verma MD

## 2019-07-01 ENCOUNTER — TELEPHONE (OUTPATIENT)
Dept: PALLIATIVE MEDICINE | Facility: CLINIC | Age: 74
End: 2019-07-01

## 2019-07-01 NOTE — TELEPHONE ENCOUNTER
Auth No / Request ID [id number]  Status Auto-Approved  Decision Approved  Effective Date 07/01/2019  Expiration Date 08/15/2019  Decision Date 07/01/2019      Patient is scheduled for MAGGI CHAPPELL    [facility]

## 2019-07-01 NOTE — TELEPHONE ENCOUNTER
Pre-screening Questions for Radiology Injections:    Injection to be done at which interventional clinic site? [facility]    Instruct patient to arrive as directed prior to the scheduled appointment time:    [place] minutes before      [place]: 30 minutes before; if IV needed 1 hour before     Procedure ordered by MELA    Procedure ordered? LESI        Transforaminal Cervical SUSIE - Dr. Lucia Rico ONLY    What insurance would patient like us to bill for this procedure? [employer]      Worker's comp or MVA (motor vehicle accident) -Any injection DO NOT SCHEDULE and route to Mary Oh.      [employer] insurance - For SI joint injections, DO NOT SCHEDULE and route Mary Oh.       [employer] - Any injection besides hip/shoulder/knee joint DO NOT SCHEDULE and route to Mary Oh. She will obtain PA and call pt back to schedule procedure or notify pt of denial.        [employer]-Route to [place] for review      IF SCHEDULING IN [place] AND NEEDS A PA, IT IS OKAY TO SCHEDULE. [place] HANDLES THEIR OWN PA'S AFTER THE PATIENT IS SCHEDULED. PLEASE SCHEDULE AT LEAST 1 WEEK OUT SO A PA CAN BE OBTAINED.      Any chance of pregnancy? Not Applicable   If YES, do NOT schedule and route to RN Dennis    Is an  needed? No     Patient has a drive home? (mandatory) YES:     Is patient taking any blood thinners (plavix, coumadin, jantoven, warfarin, heparin, pradaxa or dabigatran )? No   If hold needed, do NOT schedule, route to RN Dennis     Is patient taking any aspirin products (includes Excedrin and Fiorinal)? Yes - Pt takes 81mg daily; instructed to hold 0 day(s) prior to procedure.      If more than 325mg/day do NOT schedule; route to RN Dennis     For CERVICAL procedures, hold all aspirin products for 6 days.     Tell pt that if aspirin product is not held for 6 days, the procedure WILL BE cancelled.      Does the patient have a bleeding or clotting disorder? No     If YES, okay to schedule AND route to  RN nurse pool    For any patients with platelet count <100, must be forwarded to provider    Is patient diabetic?  Yes  If YES, have them bring their glucometer.    Does patient have an active infection or treated for one within the past week? No     Is patient currently taking any antibiotics?  No     For patients on chronic, preventative, or prophylactic antibiotics, procedures may be scheduled.     For patients on antibiotics for active or recent infection:antibiotic course must have been completed for 4 days    Is patient currently taking any steroid medications? (i.e. Prednisone, Medrol)  No     For patients on steroid medications, course must have been completed for 4 days    Reviewed with patient:  If you are started on any steroids or antibiotics between now and your appointment, you must contact us because the procedure may need to be cancelled.  Yes    Is patient actively being treated for cancer or immunocompromised? No  If YES, do NOT schedule and route to RN pool     Are you able to get on and off an exam table with minimal or no assistance? Yes  If NO, do NOT schedule and route to RN pool    Are you able to roll over and lay on your stomach with minimal or no assistance? Yes  If NO, do NOT schedule and route to RN pool     Any allergies to contrast dye, iodine, shellfish, or numbing and steroid medications? No  If YES, route to RN pool AND add allergy information to appointment notes    Allergies: No known allergies      Has the patient had a flu shot or any other vaccinations within 7 days before or after the procedure.  No     Does patient have an MRI/CT?  YES:   (SI joint, hip injections, lumbar sympathetic blocks, and stellate ganglion blocks do not require an MRI)    Was the MRI done w/in the last 3 years?  Yes    Was MRI done at [place]? Yes      If not, where was it done? N/A       If MRI was not done at [place], [facility] or [facility] do NOT schedule and route to nursing.  If pt has an  imaging disc, the injection may be scheduled but pt has to bring disc to appt. If they show up w/out disc the injection cannot be done    Reminders (please tell patient if applicable):       Instructed pt to arrive 30 minutes early for IV start if this is for a cervical procedure, ALL sympathetic (stellate ganglion, hypogastric, or lumbar sympathetic block) and all sedation procedures (RFA, spinal cord stimulation trials).  Not Applicable   -IVs are not routinely placed for Dr. Olvera cervical cases   -Dr. Rogers: IVs for cervical ESIs and cervical TBDs (not CMBBs/facet inj)      If NPO for sedation, informed patient that it is okay to take medications with sips of water (except if they are to hold blood thinners).  Not Applicable   *DO take blood pressure medication if it is prescribed*      If this is for a cervical SUSIE, informed patient that aspirin needs to be held for 6 days.   Not Applicable      For all patients not having spinal cord stimulator (SCS) trials or radiofrequency ablations (RFAs), informed patient:    IV sedation is not provided for this procedure.  If you feel that an oral anti-anxiety medication is needed, you can discuss this further with your referring provider or primary care provider.  The Pain Clinic provider will discuss specifics of what the procedure includes at your appointment.  Most procedures last 10-20 minutes.  We use numbing medications to help with any discomfort during the procedure.  NO      Do not schedule procedures requiring IV placement in the first appointment of the day or first appointment after lunch. Do NOT schedule at 0745, 0815 or 1245.       For patients 85 or older we recommend having an adult stay w/ them for the remainder of the day.       Does the patient have any questions?    Mary Oh  [facility]

## 2019-07-01 NOTE — TELEPHONE ENCOUNTER
VIPUL for patient to schedule Lumbar SUSIE        Luz Maria Justin    [facility]

## 2019-07-03 NOTE — PROGRESS NOTES
[facility] - Procedure Note    Date of Service: 7/3/2019    Procedure performed: Left L5-S1 transforaminal epidural steroid injection with fluoroscopic guidance  Diagnosis: Lumbar spondylosis; Lumbar radiculitis/radiculopathy  : Iman Olvera MD  Anesthesia: none    Indications: Jaylen De La Vega is a 73 year old male who is seen at the request of Dr. Verma for lumbar transforaminal epidural steroid injection. The patient describes LEFT sided low back pain radiating to the calf and ankle on that side. The patient has been exhibiting symptoms consistent with lumbar intraspinal inflammation and radiculopathy. Symptoms have been persistent, disabling, and intermittently severe. The patient reports minimal improvement with conservative treatment, including PT and medications.    Lumbar MRI was done on 6/27/2019 which showed:   FINDINGS: There are 5 lumbar-type vertebral bodies assumed for the  purposes of this dictation with a well-formed disc space between S1  and S2. The tip of the conus terminates at the level of L1-L2.      Alignment of the lumbar spine is normal. No loss of vertebral body  height. There are no destructive osseous lesions. Severe loss of  intervertebral disc height with disc desiccation and Modic-type  degenerative endplate changes at L5-S1. Moderate loss of disc height  and degenerative changes at L4-L5. Mild disc desiccation at L1-L2.  Focal Schmorl's node type deformity with associated edema in the  inferior L5 endplate. Small Schmorl's node type deformity at the  inferior L1 endplate.      Level by level as follows:      T12-L1: No significant disc herniation. No spinal canal or neural  foraminal narrowing.      L1-L2: No significant disc herniation. No spinal canal or neural  foraminal narrowing.      L2-L3: No significant disc herniation. No spinal canal or neural  foraminal narrowing.      L3-L4: No significant disc herniation. No spinal canal or  neural  foraminal narrowing.      L4-L5: Marked circumferential disc bulge and endplate osteophytic  spurring. Mild facet hypertrophy. Findings result in mild-to-moderate  bilateral neural foraminal narrowing. Mild central spinal canal  narrowing.      L5-S1: Marked circumferential disc bulge with endplate osteophytic  spurring. Superimposed left subarticular disc extrusion extending  caudally to the upper third of the S1 vertebral body. Mild-to-moderate  bilateral neural foraminal narrowing, right greater than left. Disc  herniation may slightly impinge the traversing left S1 nerve root in  the lateral recess.      Paraspinous soft tissues: Normal.                                                                      IMPRESSION:  Degenerative changes in the lumbar spine as described  above most pronounced at the L4-L5 and L5-S1 levels. Degenerative  changes have slightly progressed since previous MR.    Allergies:      Allergies   Allergen Reactions     No Known Allergies         Vitals:  BP (!) 150/91   Pulse 84   SpO2 95%     Review of Systems: The patient denies recent fever, chills, illness, use of antibiotics or anticoagulants. All other 10-point review of systems negative.     Procedure: The procedure and risks were explained, and informed written consent was obtained from the patient. Risks include but are not limited to: infection, bleeding, increased pain, and damage to soft tissue, nerve, muscle, and vasculature structures. After getting informed consent, patient was brought into the procedure suite and was placed in a prone position on the procedure table. A Pause for the Cause was performed. Patient was prepped and draped in sterile fashion.     After identifying the left L5-S1 neuroforamen, the C-arm was rotated to a left lateral oblique angle.  A total of 4.5 mL of Lidocaine 1% was used to anesthetize the skin and the needle track at a skin entry site coaxial with the fluoroscopy beam, and overriding  the superior aspect of the neuroforamen.  A 22 gauge 5 inch spinal needle was advanced under intermittent fluoroscopy until it entered the foramen superiorly.    The position was then inspected from anteroposterior and lateral views, and the needle adjusted appropriately.  After negative aspiration, a total of 1 mL of Omnipaque-300 was injected using static and continuous fluoroscopy confirming appropriate position, with spread along the nerve root sheath and into the epidural space, with no intravascular or intrathecal uptake. 9 mL of Omnipaque-300 was wasted.    2mL of 1% lidocaine with 20 mg of dexamethasone was injected.  The needle was removed. Hemostasis was achieved, the area was cleaned, and bandaids were placed when appropriate. Images were saved to PACS.    The patient tolerated the procedure well, and was taken to the recovery room, and there was no evidence of procedural complications. No new sensory or motor deficits were noted following the procedure. The patient was stable and able to ambulate on discharge home. Post-procedure instructions were provided.     Pre-procedure pain score: 4/10 in the back, 2/10 in the leg  Post-procedure pain score: 4/10 in the back, 2/10 in the leg    Assessment/Plan: Jaylen De La Vega is a 73 year old male s/p Left L5-S1 transforaminal epidural steroid injection today for lumbar spondylosis, radiculitis/radiculopathy.     1. Following today's procedure, the patient was advised to contact the [facility] for any of the following:   Fever, chills, or night sweats   New onset of pain, numbness, or weakness   Any questions/concerns regarding the procedure  If unable to contact the Pain Center, the patient was instructed to go to a local Emergency Room for any complications.   2. The patient will receive a follow-up call in 1 week.  3. The patient should follow-up with the referring provider in 2 weeks for post-procedure evaluation.      Iman Olvera MD    [facility]

## 2019-07-05 ENCOUNTER — ANCILLARY PROCEDURE (OUTPATIENT)
Dept: GENERAL RADIOLOGY | Facility: CLINIC | Age: 74
End: 2019-07-05
Attending: ANESTHESIOLOGY
Payer: COMMERCIAL

## 2019-07-05 ENCOUNTER — RADIOLOGY INJECTION OFFICE VISIT (OUTPATIENT)
Dept: PALLIATIVE MEDICINE | Facility: CLINIC | Age: 74
End: 2019-07-05
Payer: COMMERCIAL

## 2019-07-05 VITALS — SYSTOLIC BLOOD PRESSURE: 150 MMHG | HEART RATE: 84 BPM | OXYGEN SATURATION: 95 % | DIASTOLIC BLOOD PRESSURE: 91 MMHG

## 2019-07-05 DIAGNOSIS — M54.16 LUMBAR RADICULOPATHY: Primary | ICD-10-CM

## 2019-07-05 DIAGNOSIS — M54.16 LUMBAR RADICULOPATHY: ICD-10-CM

## 2019-07-05 PROCEDURE — 64483 NJX AA&/STRD TFRM EPI L/S 1: CPT | Mod: LT | Performed by: ANESTHESIOLOGY

## 2019-07-05 NOTE — NURSING NOTE
Discharge Information    IV Discontiued Time:  NA    Amount of Fluid Infused:  NA    Discharge Criteria = When patient returns to baseline or as per MD order    Consciousness:  Pt is fully awake    Circulation:  BP +/- 20% of pre-procedure level    Respiration:  Patient is able to breathe deeply    O2 Sat:  Patient is able to maintain O2 Sat >92% on room air    Activity:  Moves 4 extremities on command    Ambulation:  Patient is able to stand and walk or stand and pivot into wheelchair    Dressing:  Clean/dry or No Dressing    Notes:   Discharge instructions and AVS given to patient    Patient meets criteria for discharge?  YES    Admitted to PCU?  No    Responsible adult present to accompany patient home?  Yes    Signature/Title:    Khalida Noriega  RN Care Coordinator  [facility] [place]

## 2019-07-05 NOTE — NURSING NOTE
Pre-procedure Intake    Have you been fasting? NA    If yes, for how long?     Are you taking a prescribed blood thinner such as coumadin, Plavix, Xarelto?    No    If yes, when did you take your last dose?     Do you take aspirin?  Yes -   ASA    If cervical procedure, have you held aspirin for 6 days?   NA    Do you have any allergies to contrast dye, iodine, steroid and/or numbing medications?  NO    Are you currently taking antibiotics or have an active infection?  NO    Have you had a fever/elevated temperature within the past week? NO    Are you currently taking oral steroids? NO    Do you have a ? Yes       Are you pregnant or breastfeeding?  Not Applicable    Are the vital signs normal?  No: BP: 145/87

## 2019-07-18 ENCOUNTER — HOSPITAL ENCOUNTER (OUTPATIENT)
Dept: CARDIOLOGY | Facility: CLINIC | Age: 74
Discharge: HOME | End: 2019-07-18
Attending: FAMILY MEDICINE | Admitting: FAMILY MEDICINE
Payer: COMMERCIAL

## 2019-07-18 DIAGNOSIS — R01.1 UNDIAGNOSED CARDIAC MURMURS: ICD-10-CM

## 2019-07-18 PROBLEM — I35.2 AORTIC INSUFFICIENCY WITH AORTIC STENOSIS: Status: ACTIVE | Noted: 2019-07-18

## 2019-07-18 PROCEDURE — 93306 TTE W/DOPPLER COMPLETE: CPT | Mod: 26 | Performed by: INTERNAL MEDICINE

## 2019-07-18 PROCEDURE — 93306 TTE W/DOPPLER COMPLETE: CPT

## 2019-07-18 NOTE — RESULT ENCOUNTER NOTE
Mr. De La Vega,    Your heart murmur is due to a mild narrowing of your aortic valve.  This is a fairly common condition and yours is very mild.  We'll check this again with another echocardiogram in 3-5 years Nik.      If you have further questions about the interpretation of your labs, [email or web address] is a good website to check out for further information.    Please contact the clinic if you have additional questions.  Thank you.    Sincerely,    Tj Verma MD

## 2019-08-21 DIAGNOSIS — E11.9 TYPE 2 DIABETES MELLITUS WITHOUT COMPLICATION, WITHOUT LONG-TERM CURRENT USE OF INSULIN (H): ICD-10-CM

## 2019-08-21 RX ORDER — BLOOD SUGAR DIAGNOSTIC
STRIP MISCELLANEOUS
Qty: 100 STRIP | Refills: 3 | Status: SHIPPED | OUTPATIENT
Start: 2019-08-21 | End: 2022-09-01

## 2019-08-21 NOTE — TELEPHONE ENCOUNTER
"Requested Prescriptions   Pending Prescriptions Disp Refills     ONETOUCH VERIO IQ test strip [Pharmacy Med Name: ONE TOUCH VERIO TEST STRIP]  Last Written Prescription Date:  9/24/2018  Last Fill Quantity: 100 each,  # refills: 1   Last office visit: 6/26/2019 with prescribing provider:  Bayron     Future Office Visit:       100 strip 1     Sig: USE 1 STRIP BY IN VITRO ROUTE 2 TIMES DAILY       Diabetic Supplies Protocol Passed - 8/21/2019  1:12 AM        Passed - Medication is active on med list        Passed - Patient is 18 years of age or older        Passed - Recent (6 mo) or future (30 days) visit within the authorizing provider's specialty     Patient had office visit in the last 6 months or has a visit in the next 30 days with authorizing provider.  See \"Patient Info\" tab in inbasket, or \"Choose Columns\" in Meds & Orders section of the refill encounter.            "

## 2019-09-29 DIAGNOSIS — J44.9 CHRONIC OBSTRUCTIVE PULMONARY DISEASE, UNSPECIFIED COPD TYPE (H): ICD-10-CM

## 2019-09-30 NOTE — TELEPHONE ENCOUNTER
Medication is being filled for 1 time refill only due to:  Patient needs to be seen because due for diabetes check 9/26/19 per last office visit note.  MALLY OchoaN, RN  [facility]

## 2019-09-30 NOTE — TELEPHONE ENCOUNTER
"Requested Prescriptions   Pending Prescriptions Disp Refills     INCRUSE ELLIPTA 62.5 MCG/INH Inhaler [Pharmacy Med Name: INCRUSE ELLIPTA 62.5 MCG INH]  Last Written Prescription Date:  9/26/2018  Last Fill Quantity: 90 each,  # refills: 3   Last office visit: 6/26/2019 with prescribing provider:  Bayron     Future Office Visit:        3     Sig: INHALE 1 PUFF BY MOUTH INTO THE LUNGS EVERY DAY       Asthma Maintenance Inhalers - Anticholinergics Failed - 9/29/2019  8:33 AM        Failed - Asthma control assessment score within normal limits in last 6 months     Please review ACT score.     No flowsheet data found.            Passed - Patient is age 12 years or older        Passed - Medication is active on med list        Passed - Recent (6 mo) or future (30 days) visit within the authorizing provider's specialty     Patient had office visit in the last 6 months or has a visit in the next 30 days with authorizing provider or within the authorizing provider's specialty.  See \"Patient Info\" tab in inbasket, or \"Choose Columns\" in Meds & Orders section of the refill encounter.            "

## 2019-10-01 ENCOUNTER — HEALTH MAINTENANCE LETTER (OUTPATIENT)
Age: 74
End: 2019-10-01

## 2019-10-02 ENCOUNTER — TELEPHONE (OUTPATIENT)
Dept: FAMILY MEDICINE | Facility: CLINIC | Age: 74
End: 2019-10-02

## 2019-10-02 DIAGNOSIS — E11.9 TYPE 2 DIABETES MELLITUS WITHOUT COMPLICATION, WITHOUT LONG-TERM CURRENT USE OF INSULIN (H): Primary | ICD-10-CM

## 2019-10-02 NOTE — TELEPHONE ENCOUNTER
Reason for Call: Request for an order or referral:    Order or referral being requested: Labs    Date needed: at your convenience    Has the patient been seen by the PCP for this problem? YES    Additional comments: Per OV notes from June Dr. Verma requested A1c follow up in 12 weeks. Please place these orders. Also patient rec'd 1 month refill on some medications. Pt has PX scheduled for December.     Phone number Patient can be reached at:  Home number on file [phone] (home)    Best Time:  Anytime     Can we leave a detailed message on this number?  YES    Call taken on 10/2/2019 at 8:20 AM by Kristen Miguel

## 2019-10-02 NOTE — TELEPHONE ENCOUNTER
Lab order placed, please call patient to set up lab draw and advise him to request any refills he needs through his pharmacy. Thank you, Kathleen Faust R.N.

## 2019-10-02 NOTE — TELEPHONE ENCOUNTER
Called pt and spoke with pt.  Made appt for lab only and flu shot.  He also wanted a shingles vaccine but he has Medicare part D and they require this be done in a pharmacy so he will do that.  Shagufta Wilson

## 2019-10-03 ENCOUNTER — ALLIED HEALTH/NURSE VISIT (OUTPATIENT)
Dept: NURSING | Facility: CLINIC | Age: 74
End: 2019-10-03
Payer: COMMERCIAL

## 2019-10-03 DIAGNOSIS — Z23 NEED FOR PROPHYLACTIC VACCINATION AND INOCULATION AGAINST INFLUENZA: Primary | ICD-10-CM

## 2019-10-03 DIAGNOSIS — E11.9 TYPE 2 DIABETES MELLITUS WITHOUT COMPLICATION, WITHOUT LONG-TERM CURRENT USE OF INSULIN (H): ICD-10-CM

## 2019-10-03 LAB — HBA1C MFR BLD: 7.5 % (ref 0–5.6)

## 2019-10-03 PROCEDURE — 83036 HEMOGLOBIN GLYCOSYLATED A1C: CPT | Performed by: FAMILY MEDICINE

## 2019-10-03 PROCEDURE — 36415 COLL VENOUS BLD VENIPUNCTURE: CPT | Performed by: FAMILY MEDICINE

## 2019-10-03 PROCEDURE — G0008 ADMIN INFLUENZA VIRUS VAC: HCPCS

## 2019-10-03 PROCEDURE — 90662 IIV NO PRSV INCREASED AG IM: CPT

## 2019-10-03 NOTE — RESULT ENCOUNTER NOTE
Mr. De La Vega,    -A1C (test of diabetes control the last 2-3 months) is at your goal. Please recheck your A1C test in 3 months as we're scheduled to do when we see each other in December.    If you have further questions about the interpretation of your labs, [email or web address] is a good website to check out for further information.    Please contact the clinic if you have additional questions.  Thank you.    Sincerely,    Tj Verma MD

## 2019-10-09 PROBLEM — M25.572 PAIN IN JOINT INVOLVING ANKLE AND FOOT, LEFT: Status: RESOLVED | Noted: 2019-04-01 | Resolved: 2019-10-09

## 2019-10-09 NOTE — PROGRESS NOTES
Patient did not return for further treatment and no additional progress was noted.  Please refer to the progress note and goal flowsheet completed on 05/08/19 for discharge information.

## 2019-10-15 ENCOUNTER — MYC MEDICAL ADVICE (OUTPATIENT)
Dept: FAMILY MEDICINE | Facility: CLINIC | Age: 74
End: 2019-10-15

## 2019-10-15 DIAGNOSIS — M51.379 DEGENERATION OF LUMBAR OR LUMBOSACRAL INTERVERTEBRAL DISC: Primary | ICD-10-CM

## 2019-10-15 DIAGNOSIS — M51.26 LUMBAR DISC HERNIATION: ICD-10-CM

## 2019-10-15 NOTE — TELEPHONE ENCOUNTER
[facility]. Wouldn't the pain doctor order this injection or does the PCP need to order? Please advise. Thanks    Haritha Swan  Referral Coordinator

## 2019-10-16 ENCOUNTER — TELEPHONE (OUTPATIENT)
Dept: PALLIATIVE MEDICINE | Facility: CLINIC | Age: 74
End: 2019-10-16

## 2019-10-16 NOTE — TELEPHONE ENCOUNTER
Is this for a repeat? If not, I will need levels,laterality and if it is transforminal or interlaminar             Mary CHAPPELL    [facility]

## 2019-10-16 NOTE — TELEPHONE ENCOUNTER
Pre-screening Questions for Radiology Injections:    Injection to be done at which interventional clinic site? [facility]    Instruct patient to arrive as directed prior to the scheduled appointment time:    [place] minutes before      [place]: 30 minutes before; if IV needed 1 hour before     Procedure ordered by Wade    Procedure ordered? LESI      Transforaminal Cervical SUSIE - Dr. Lucia Rico ONLY    What insurance would patient like us to bill for this procedure? [employer]       Worker's comp or MVA (motor vehicle accident) -Any injection DO NOT SCHEDULE and route to Mary Oh.      [employer] insurance - For SI joint injections, DO NOT SCHEDULE and route Mray Oh.       [employer] - Any injection besides hip/shoulder/knee joint DO NOT SCHEDULE and route to Mary Oh. She will obtain PA and call pt back to schedule procedure or notify pt of denial.       HP CIGNA-Route to [place] for review      **BCBS- ALL need to be routed to [place] for review if a PA is needed**      IF SCHEDULING IN [place] AND NEEDS A PA, IT IS OKAY TO SCHEDULE. [place] HANDLES THEIR OWN PA'S AFTER THE PATIENT IS SCHEDULED. PLEASE SCHEDULE AT LEAST 1 WEEK OUT SO A PA CAN BE OBTAINED.    Any chance of pregnancy? Not Applicable   If YES, do NOT schedule and route to RN pool    Is an  needed? No     Patient has a drive home? (mandatory) YES: informed     Is patient taking any blood thinners (plavix, coumadin, jantoven, warfarin, heparin, pradaxa or dabigatran )? No   If hold needed, do NOT schedule, route to RN pool     Is patient taking any aspirin products (includes Excedrin and Fiorinal)? Yes - Pt takes 81mg daily; instructed to hold 0 day(s) prior to procedure.      If more than 325mg/day do NOT schedule; route to RN pool     For CERVICAL procedures, hold all aspirin products for 6 days.     Tell pt that if aspirin product is not held for 6 days, the procedure WILL BE cancelled.      Does the patient  have a bleeding or clotting disorder? No     If YES, okay to schedule AND route to RN nurse pool    For any patients with platelet count <100, must be forwarded to provider    Is patient diabetic?  Yes  If YES, instruct them to bring their glucometer.    Does patient have an active infection or treated for one within the past week? No     Is patient currently taking any antibiotics?  No     For patients on chronic, preventative, or prophylactic antibiotics, procedures may be scheduled.     For patients on antibiotics for active or recent infection:antibiotic course must have been completed for 4 days    Is patient currently taking any steroid medications? (i.e. Prednisone, Medrol)  No     For patients on steroid medications, course must have been completed for 4 days    Reviewed with patient:  If you are started on any steroids or antibiotics between now and your appointment, you must contact us because the procedure may need to be cancelled.  Yes    Is patient actively being treated for cancer or immunocompromised? No  If YES, do NOT schedule and route to RN pool     Are you able to get on and off an exam table with minimal or no assistance? Yes  If NO, do NOT schedule and route to RN pool    Are you able to roll over and lay on your stomach with minimal or no assistance? Yes  If NO, do NOT schedule and route to RN pool     Any allergies to contrast dye, iodine, shellfish, or numbing and steroid medications? No  If YES, route to RN pool AND add allergy information to appointment notes    Allergies: No known allergies      Has the patient had a flu shot or any other vaccinations within 7 days before or after the procedure.  No     Does patient have an MRI/CT?  YES: 2019  Check Procedure Scheduling Grid to see if required.      Was the MRI done within the last 3 years?  Yes    If yes, where was the MRI done i.e.[facility], [facility], [facility], [facility] etc?       If no, do not schedule and route to RN  pool    If MRI was not done at [facility], [facility] or [facility] do NOT schedule and route to RN pool.      If pt has an imaging disc, the injection MAY be scheduled but pt has to bring disc to appt.     If they show up without the disc the injection cannot be done    Reminders (please tell patient if applicable):       Instructed pt to arrive 30 minutes early for IV start if required. (Check Procedure Scheduling Grid)  Not Applicable      If celiac plexus block, informed patient NPO for 6 hours and that it is okay to take medications with sips of water, especially blood pressure medications  Not Applicable         If this is for a cervical procedure, informed patient that aspirin needs to be held for 6 days.   Not Applicable      For all patients not having spinal cord stimulator (SCS) trials or radiofrequency ablations (RFAs), informed patient:    IV sedation is not provided for this procedure.  If you feel that an oral anti-anxiety medication is needed, you can discuss this further with your referring provider or primary care provider.  The Pain Clinic provider will discuss specifics of what the procedure includes at your appointment.  Most procedures last 10-20 minutes.  We use numbing medications to help with any discomfort during the procedure.  Not Applicable      Do not schedule procedures requiring IV placement in the first appointment of the day or first appointment after lunch. Do NOT schedule at 0745, 0815 or 1245.       For patients 85 or older we recommend having an adult stay w/ them for the remainder of the day.       Does the patient have any questions?  VERITO Fair  [facility]

## 2019-10-16 NOTE — TELEPHONE ENCOUNTER
Reviewed chart with VIKRAM Ayala.      Yes, this is a repeat injection. Per patients report, he had significant relief from the injection done on July 5th: Procedure performed: Left L5-S1 transforaminal epidural steroid injection with fluoroscopic guidance    Nik De La Vega would like to request a referral.   Reason: Epidural steroid injection   Requested provider: [facility], Dr. Iman Olvera   Comment:   I had injection on July 5th and had significant  pain relief for about 4 weeks.   As time passed pain relief has lessened.   -----------  Will route to GERBER rosas for review to see if the above information is sufficient for insurance review.     Kenia Lima RN  Care Coordinator  [facility]

## 2019-10-16 NOTE — TELEPHONE ENCOUNTER
I did not order this.  This was ordered by his PCP.  I did his last injection which was a left L5-S1 transforaminal epidural steroid injection BUT it does not state on the order whether or not this is intended to be a repeat.  Can we reach out to the PCP and ask what he intended?  He just placed the order yesterday.     Iman Olvera MD

## 2019-10-16 NOTE — TELEPHONE ENCOUNTER
Called patient to schedule LESI, will call back at another time.      Mary Fair    [facility]

## 2019-10-17 NOTE — PROGRESS NOTES
[facility] - Procedure Note    Date of Service: 10/18/2019    Procedure performed: Left L5-S1 transforaminal epidural steroid injection with fluoroscopic guidance  Diagnosis: Lumbar spondylosis; Lumbar radiculitis/radiculopathy  : Iman Olvera MD  Anesthesia: none    Indications: Jaylen De La Vega is a 73 year old male who is seen at the request of Dr. Verma for lumbar transforaminal epidural steroid injection. The patient describes LEFT sided low back pain radiating to the calf and ankle on that side. The patient has been exhibiting symptoms consistent with lumbar intraspinal inflammation and radiculopathy. Symptoms have been persistent, disabling, and intermittently severe. The patient reports minimal improvement with conservative treatment, including PT and medications.    This is a repeat injection.  Previous Left L5-S1 transforaminal epidural steroid injection done by myself on 7/3/2019 provided good pain relief for a period of 2-3 months.       Lumbar MRI was done on 6/27/2019 which showed:   FINDINGS: There are 5 lumbar-type vertebral bodies assumed for the  purposes of this dictation with a well-formed disc space between S1  and S2. The tip of the conus terminates at the level of L1-L2.      Alignment of the lumbar spine is normal. No loss of vertebral body  height. There are no destructive osseous lesions. Severe loss of  intervertebral disc height with disc desiccation and Modic-type  degenerative endplate changes at L5-S1. Moderate loss of disc height  and degenerative changes at L4-L5. Mild disc desiccation at L1-L2.  Focal Schmorl's node type deformity with associated edema in the  inferior L5 endplate. Small Schmorl's node type deformity at the  inferior L1 endplate.      Level by level as follows:      T12-L1: No significant disc herniation. No spinal canal or neural  foraminal narrowing.      L1-L2: No significant disc herniation. No spinal canal or  neural  foraminal narrowing.      L2-L3: No significant disc herniation. No spinal canal or neural  foraminal narrowing.      L3-L4: No significant disc herniation. No spinal canal or neural  foraminal narrowing.      L4-L5: Marked circumferential disc bulge and endplate osteophytic  spurring. Mild facet hypertrophy. Findings result in mild-to-moderate  bilateral neural foraminal narrowing. Mild central spinal canal  narrowing.      L5-S1: Marked circumferential disc bulge with endplate osteophytic  spurring. Superimposed left subarticular disc extrusion extending  caudally to the upper third of the S1 vertebral body. Mild-to-moderate  bilateral neural foraminal narrowing, right greater than left. Disc  herniation may slightly impinge the traversing left S1 nerve root in  the lateral recess.      Paraspinous soft tissues: Normal.                                                                      IMPRESSION:  Degenerative changes in the lumbar spine as described  above most pronounced at the L4-L5 and L5-S1 levels. Degenerative  changes have slightly progressed since previous MR.    Allergies:      Allergies   Allergen Reactions     No Known Allergies         Vitals:  /78   Pulse 94   SpO2 95%     Review of Systems: The patient denies recent fever, chills, illness, use of antibiotics or anticoagulants. All other 10-point review of systems negative.     Procedure: The procedure and risks were explained, and informed written consent was obtained from the patient. Risks include but are not limited to: infection, bleeding, increased pain, and damage to soft tissue, nerve, muscle, and vasculature structures. After getting informed consent, patient was brought into the procedure suite and was placed in a prone position on the procedure table. A Pause for the Cause was performed. Patient was prepped and draped in sterile fashion.     After identifying the left L5-S1 neuroforamen, the C-arm was rotated to a left  lateral oblique angle.  A total of 4.5 mL of Lidocaine 1% was used to anesthetize the skin and the needle track at a skin entry site coaxial with the fluoroscopy beam, and overriding the superior aspect of the neuroforamen.  A 22 gauge 5 inch spinal needle was advanced under intermittent fluoroscopy until it entered the foramen superiorly.    The position was then inspected from anteroposterior and lateral views, and the needle adjusted appropriately.  After negative aspiration, a total of 1 mL of Omnipaque-300 was injected using static and continuous fluoroscopy confirming appropriate position, with spread along the nerve root sheath and into the epidural space, with no intravascular or intrathecal uptake. 9 mL of Omnipaque-300 was wasted.    2mL of 1% lidocaine with 20 mg of dexamethasone was injected.  The needle was removed. Hemostasis was achieved, the area was cleaned, and bandaids were placed when appropriate. Images were saved to PACS.    The patient tolerated the procedure well, and was taken to the recovery room, and there was no evidence of procedural complications. No new sensory or motor deficits were noted following the procedure. The patient was stable and able to ambulate on discharge home. Post-procedure instructions were provided.     Pre-procedure pain score: 3/10 in the back, 5/10 in the leg  Post-procedure pain score: 1/10 in the back, 1/10 in the leg    Assessment/Plan: Jaylen De La Vega is a 73 year old male s/p Left L5-S1 transforaminal epidural steroid injection today for lumbar spondylosis, radiculitis/radiculopathy.     1. Following today's procedure, the patient was advised to contact the [facility] for any of the following:   Fever, chills, or night sweats   New onset of pain, numbness, or weakness   Any questions/concerns regarding the procedure  If unable to contact the Pain Center, the patient was instructed to go to a local Emergency Room for any complications.    2. The patient will receive a follow-up call in 1 week.  3. The patient should follow-up with the referring provider in 2 weeks for post-procedure evaluation.      Iman Olvera MD   [facility] [place]

## 2019-10-17 NOTE — TELEPHONE ENCOUNTER
Auth No / Request ID [id number]    Status Auto-Approved    Decision Approved    Effective Date 10/17/2019     Expiration Date 12/01/2019    Decision Date 10/17/2019        Junior to schedule SCOTT CHAPPELL    [facility]

## 2019-10-18 ENCOUNTER — ANCILLARY PROCEDURE (OUTPATIENT)
Dept: GENERAL RADIOLOGY | Facility: CLINIC | Age: 74
End: 2019-10-18
Attending: ANESTHESIOLOGY
Payer: COMMERCIAL

## 2019-10-18 ENCOUNTER — RADIOLOGY INJECTION OFFICE VISIT (OUTPATIENT)
Dept: PALLIATIVE MEDICINE | Facility: CLINIC | Age: 74
End: 2019-10-18
Payer: COMMERCIAL

## 2019-10-18 VITALS — SYSTOLIC BLOOD PRESSURE: 135 MMHG | HEART RATE: 91 BPM | OXYGEN SATURATION: 100 % | DIASTOLIC BLOOD PRESSURE: 79 MMHG

## 2019-10-18 DIAGNOSIS — M51.379 DEGENERATION OF LUMBAR OR LUMBOSACRAL INTERVERTEBRAL DISC: ICD-10-CM

## 2019-10-18 DIAGNOSIS — M54.16 LUMBAR RADICULOPATHY: Primary | ICD-10-CM

## 2019-10-18 PROCEDURE — 64483 NJX AA&/STRD TFRM EPI L/S 1: CPT | Mod: LT | Performed by: ANESTHESIOLOGY

## 2019-10-18 NOTE — PATIENT INSTRUCTIONS
[facility] Procedure Discharge Instructions    Today you saw:   Dr. Iman Olvera      Your procedure:  Epidural steroid injection      Medications used:  Lidocaine (anesthetic)     Dexamethasone (steroid)  Omnipaque (contrast)               Be cautious when walking as numbness and/or weakness in the legs may occur up to 6-8 hours after the procedure due to effect of the local anesthetic    Do not drive for 6 hours. The effect of the local anesthetic could slow your reflexes.     Avoid strenuous activity for the first 24 hours. You may resume your regular activities after that.     You may shower, however avoid swimming, tub baths or hot tubs for 24 hours following your procedure    You may have a mild to moderate increase in pain for several days following the injection.      You may use ice packs for 10-15 minutes, 3 to 4 times a day at the injection site for comfort    Do not use heat to painful areas for 6 to 8 hours. This will give the local anesthetic time to wear off and prevent you from accidentally burning your skin.    Unless you have been directed to avoid the use of anti-inflammatory medications (NSAIDS-ibuprofen, Aleve, Motrin), you may use these medications or Tylenol for pain control if needed.     With diabetes, check your blood sugar more frequently than usual as your blood sugar may be higher than normal for 10-14 days following a steroid injection. Contact your doctor who manages your diabetes if your blood sugar is higher than usual    Possible side effects of steroids that you may experience include flushing, elevated blood pressure, increased appetite, mild headaches and restlessness.  All of these symptoms will get better with time.    It may take up to 14 days for the steroid medication to start working although you may feel the effect as early as a few days after the procedure.     Follow up with your referring provider in 2-3 weeks      If you experience any of the  following, call the pain center line during work hours at [phone] or on-call physician after hours at [phone]:  -Fever over 100 degree F  -Swelling, bleeding, redness, drainage, warmth at the injection site  -Progressive weakness or numbness in your legs   -Loss of bowel or bladder function  -Unusual headache that is not relieved by Tylenol or your regular headache medication  -Unusual new onset of pain that is not improving

## 2019-10-18 NOTE — NURSING NOTE
Discharge Information    IV Discontiued Time:  NA    Amount of Fluid Infused:  NA    Discharge Criteria = When patient returns to baseline or as per MD order    Consciousness:  Pt is fully awake    Circulation:  BP +/- 20% of pre-procedure level    Respiration:  Patient is able to breathe deeply    O2 Sat:  Patient is able to maintain O2 Sat >92% on room air    Activity:  Moves 4 extremities on command    Ambulation:  Patient is able to stand and walk or stand and pivot into wheelchair    Dressing:  Clean/dry or No Dressing    Notes:   Discharge instructions and AVS given to patient    Patient meets criteria for discharge?  YES    Admitted to PCU?  No    Responsible adult present to accompany patient home?  Yes    Signature/Title:    Khalida Noriega RN  RN Care Coordinator  [facility] [place]

## 2019-11-03 DIAGNOSIS — J44.9 CHRONIC OBSTRUCTIVE PULMONARY DISEASE, UNSPECIFIED COPD TYPE (H): ICD-10-CM

## 2019-11-04 NOTE — TELEPHONE ENCOUNTER
Medication is being filled for 1 time refill only due to:  has upcoming appt   Clarissa Knapp RN- Triage [employer]

## 2019-11-04 NOTE — TELEPHONE ENCOUNTER
"Requested Prescriptions   Pending Prescriptions Disp Refills     INCRUSE ELLIPTA 62.5 MCG/INH Inhaler [Pharmacy Med Name: INCRUSE ELLIPTA 62.5 MCG INH]  Last Written Prescription Date:  9/30/2019  Last Fill Quantity: 30 each,  # refills: 0   Last office visit: 6/26/2019 with prescribing provider:  Bayron     Future Office Visit:   Next 5 appointments (look out 90 days)    Dec 30, 2019  8:00 AM CST  PHYSICAL with Tj Verma Jr., MD  [facility] [address]  [facility] [address] [phone]             0     Sig: INHALE 1 PUFF BY MOUTH DAILY.       Asthma Maintenance Inhalers - Anticholinergics Failed - 11/3/2019 12:11 PM        Failed - Asthma control assessment score within normal limits in last 6 months     Please review ACT score.     No flowsheet data found.            Passed - Patient is age 12 years or older        Passed - Medication is active on med list        Passed - Recent (6 mo) or future (30 days) visit within the authorizing provider's specialty     Patient had office visit in the last 6 months or has a visit in the next 30 days with authorizing provider or within the authorizing provider's specialty.  See \"Patient Info\" tab in inbasket, or \"Choose Columns\" in Meds & Orders section of the refill encounter.            "

## 2019-11-06 NOTE — TELEPHONE ENCOUNTER
Infusion Nursing Note:  Monse Moya presents today for IVF and zofran.    Patient seen by provider today: No   present during visit today: Not Applicable.    Note: N/A.    Intravenous Access:  Peripheral IV placed.    Treatment Conditions:  Not Applicable.      Post Infusion Assessment:  Patient tolerated infusion without incident.  Blood return noted pre and post infusion.  Site patent and intact, free from redness, edema or discomfort.  No evidence of extravasations.  Access discontinued per protocol.       Discharge Plan:   Patient declined prescription refills.  Copy of AVS reviewed with patient and/or family.  Patient will return 11/12 for next appointment.  Patient discharged in stable condition accompanied by: self.  Departure Mode: Ambulatory.    Susie Odell RN                         Reason for call:  Patient reporting a symptom    Symptom or request: noticeable bloating, gassy, rumbling in stomach,  Diarrhea every 4-5 days    Duration (how long have symptoms been present): since April    Have you been treated for this before? No    Additional comments: Pt states his dose of Metformin was doubled in April and he has been having sxs since then.  Thinks his sxs may be due to the dose increase.    Phone Number patient can be reached at:  Home number on file [phone] (home)    Best Time:      Can we leave a detailed message on this number:  YES    Call taken on 7/5/2018 at 8:10 AM by Shagufta Wilson

## 2019-12-10 DIAGNOSIS — E78.5 HYPERLIPIDEMIA LDL GOAL <70: ICD-10-CM

## 2019-12-10 RX ORDER — SIMVASTATIN 20 MG
20 TABLET ORAL AT BEDTIME
Qty: 90 TABLET | Refills: 1 | Status: SHIPPED | OUTPATIENT
Start: 2019-12-10 | End: 2020-06-05

## 2019-12-10 NOTE — TELEPHONE ENCOUNTER
"Requested Prescriptions   Pending Prescriptions Disp Refills     simvastatin (ZOCOR) 20 MG tablet [Pharmacy Med Name: SIMVASTATIN 20 MG TABLET]  Last Written Prescription Date:  9/24/2018  Last Fill Quantity: 90 tablet,  # refills: 3   Last office visit: 6/26/2019 with prescribing provider:  Bayron     Future Office Visit:   Next 5 appointments (look out 90 days)    Dec 30, 2019  8:00 AM CST  PHYSICAL with Tj Verma Jr., MD  [facility] [address] [facility] [id number] [phone]            90 tablet 3     Sig: TAKE 1 TABLET (20 MG) BY MOUTH AT BEDTIME AT BEDTIME.       Statins Protocol Passed - 12/10/2019  2:55 AM        Passed - LDL on file in past 12 months     Recent Labs   Lab Test 06/26/19  1200   LDL 83             Passed - No abnormal creatine kinase in past 12 months     No lab results found.             Passed - Recent (12 mo) or future (30 days) visit within the authorizing provider's specialty     Patient has had an office visit with the authorizing provider or a provider within the authorizing providers department within the previous 12 mos or has a future within next 30 days. See \"Patient Info\" tab in inbasket, or \"Choose Columns\" in Meds & Orders section of the refill encounter.              Passed - Medication is active on med list        Passed - Patient is age 18 or older        "

## 2019-12-10 NOTE — TELEPHONE ENCOUNTER
Prescription approved per [facility].  MALLY OchoaN, RN  [facility]

## 2019-12-15 ENCOUNTER — HEALTH MAINTENANCE LETTER (OUTPATIENT)
Age: 74
End: 2019-12-15

## 2019-12-23 DIAGNOSIS — I10 ESSENTIAL HYPERTENSION: ICD-10-CM

## 2019-12-23 RX ORDER — LOSARTAN POTASSIUM 100 MG/1
TABLET ORAL
Qty: 90 TABLET | Refills: 1 | Status: SHIPPED | OUTPATIENT
Start: 2019-12-23 | End: 2020-06-22

## 2019-12-23 NOTE — TELEPHONE ENCOUNTER
"Requested Prescriptions   Pending Prescriptions Disp Refills     losartan (COZAAR) 100 MG tablet [Pharmacy Med Name: LOSARTAN POTASSIUM 100 MG TAB]  Last Written Prescription Date:  6/26/2019  Last Fill Quantity: 90 tablet,  # refills: 1   Last office visit: 6/26/2019 with prescribing provider:  Bayron     Future Office Visit:   Next 5 appointments (look out 90 days)    Dec 30, 2019  8:00 AM CST  PHYSICAL with Tj Verma Jr., MD  [facility] ([facility]) [address] [facility] [address] [phone]            90 tablet 1     Sig: TAKE 1 TABLET BY MOUTH EVERY DAY       Angiotensin-II Receptors Passed - 12/23/2019  3:23 AM        Passed - Last blood pressure under 140/90 in past 12 months     BP Readings from Last 3 Encounters:   10/18/19 135/79   07/05/19 (!) 150/91   06/26/19 118/70             Passed - Recent (12 mo) or future (30 days) visit within the authorizing provider's specialty     Patient has had an office visit with the authorizing provider or a provider within the authorizing providers department within the previous 12 mos or has a future within next 30 days. See \"Patient Info\" tab in inbasket, or \"Choose Columns\" in Meds & Orders section of the refill encounter.              Passed - Medication is active on med list        Passed - Patient is age 18 or older        Passed - Normal serum creatinine on file in past 12 months     Recent Labs   Lab Test 06/26/19  1200   CR 1.15             Passed - Normal serum potassium on file in past 12 months     Recent Labs   Lab Test 06/26/19  1200   POTASSIUM 4.1              "

## 2019-12-30 ENCOUNTER — OFFICE VISIT (OUTPATIENT)
Dept: FAMILY MEDICINE | Facility: CLINIC | Age: 74
End: 2019-12-30
Payer: COMMERCIAL

## 2019-12-30 VITALS
DIASTOLIC BLOOD PRESSURE: 72 MMHG | HEART RATE: 93 BPM | HEIGHT: 71 IN | TEMPERATURE: 98.3 F | OXYGEN SATURATION: 96 % | BODY MASS INDEX: 26.46 KG/M2 | SYSTOLIC BLOOD PRESSURE: 128 MMHG | WEIGHT: 189 LBS

## 2019-12-30 DIAGNOSIS — E11.9 TYPE 2 DIABETES MELLITUS WITHOUT COMPLICATION, WITHOUT LONG-TERM CURRENT USE OF INSULIN (H): ICD-10-CM

## 2019-12-30 DIAGNOSIS — I35.2 NONRHEUMATIC AORTIC INSUFFICIENCY WITH AORTIC STENOSIS: ICD-10-CM

## 2019-12-30 DIAGNOSIS — M79.622 PAIN OF LEFT UPPER ARM: ICD-10-CM

## 2019-12-30 DIAGNOSIS — Z00.00 ENCOUNTER FOR MEDICARE ANNUAL WELLNESS EXAM: Primary | ICD-10-CM

## 2019-12-30 DIAGNOSIS — Z71.89 ADVANCED DIRECTIVES, COUNSELING/DISCUSSION: ICD-10-CM

## 2019-12-30 DIAGNOSIS — I10 ESSENTIAL HYPERTENSION: ICD-10-CM

## 2019-12-30 LAB — HBA1C MFR BLD: 8 % (ref 0–5.6)

## 2019-12-30 PROCEDURE — 83036 HEMOGLOBIN GLYCOSYLATED A1C: CPT | Performed by: FAMILY MEDICINE

## 2019-12-30 PROCEDURE — 99214 OFFICE O/P EST MOD 30 MIN: CPT | Mod: 25 | Performed by: FAMILY MEDICINE

## 2019-12-30 PROCEDURE — 99397 PER PM REEVAL EST PAT 65+ YR: CPT | Performed by: FAMILY MEDICINE

## 2019-12-30 PROCEDURE — 36415 COLL VENOUS BLD VENIPUNCTURE: CPT | Performed by: FAMILY MEDICINE

## 2019-12-30 RX ORDER — METFORMIN HCL 500 MG
500 TABLET, EXTENDED RELEASE 24 HR ORAL 2 TIMES DAILY WITH MEALS
Qty: 180 TABLET | Refills: 1 | Status: SHIPPED | OUTPATIENT
Start: 2019-12-30 | End: 2020-07-15

## 2019-12-30 RX ORDER — NITROGLYCERIN 0.4 MG/1
0.4 TABLET SUBLINGUAL EVERY 5 MIN PRN
Qty: 25 TABLET | Refills: 0 | Status: SHIPPED | OUTPATIENT
Start: 2019-12-30 | End: 2022-06-29

## 2019-12-30 RX ORDER — GLIMEPIRIDE 4 MG/1
4 TABLET ORAL
Qty: 90 TABLET | Refills: 1 | Status: SHIPPED | OUTPATIENT
Start: 2019-12-30 | End: 2020-07-15

## 2019-12-30 RX ORDER — HYDROCHLOROTHIAZIDE 25 MG/1
25 TABLET ORAL DAILY
Qty: 90 TABLET | Refills: 1 | Status: SHIPPED | OUTPATIENT
Start: 2019-12-30 | End: 2020-07-15

## 2019-12-30 ASSESSMENT — MIFFLIN-ST. JEOR: SCORE: 1624.43

## 2019-12-30 NOTE — PATIENT INSTRUCTIONS
Patient Education   Personalized Prevention Plan  You are due for the preventive services outlined below.  Your care team is available to assist you in scheduling these services.  If you have already completed any of these items, please share that information with your care team to update in your medical record.  Health Maintenance Due   Topic Date Due     Zoster (Shingles) Vaccine (2 of 3) 07/22/2012     Annual Wellness Visit  10/01/2016     A1C Lab  01/03/2020       Please contact your insurance company to find out the cheapest place to obtain the new shingles vaccine series called [employer]ing[employer].

## 2019-12-30 NOTE — PROGRESS NOTES
"SUBJECTIVE:   Jaylen De La Vega is a 74 year old male who presents for Preventive Visit.  Are you in the first 12 months of your Medicare Part B coverage?  No    Physical Health:    In general, how would you rate your overall physical health? good    Outside of work, how many days during the week do you exercise? 5-7 days     Outside of work, approximately how many minutes a day do you exercise?45-60 minutes    If you drink alcohol do you typically have >3 drinks per day or >7 drinks per week? No    Do you usually eat at least 4 servings of fruit and vegetables a day, include whole grains & fiber and avoid regularly eating high fat or \"junk\" foods? Yes    Do you have any problems taking medications regularly?  No    Do you have any side effects from medications? none    Needs assistance for the following daily activities: no assistance needed    Which of the following safety concerns are present in your home?  none identified     Hearing impairment: No    In the past 6 months, have you been bothered by leaking of urine? no    Mental Health:    In general, how would you rate your overall mental or emotional health? excellent  PHQ-2 Score:      Do you feel safe in your environment? YES    Have you ever done Advance Care Planning? (For example, a Health Directive, POLST, or a discussion with a medical provider or your loved ones about your wishes): No, advance care planning information given to patient to review.  Advanced care planning was discussed at today's visit.    Additional concerns to address?  YES    Fall risk:  Fallen 2 or more times in the past year?: No  Any fall with injury in the past year?: No    Cognitive Screenin) Repeat 3 items (Leader, Season, Table)      2) Clock draw:   NORMAL  3) 3 item recall:   Recalls 3 objects  Results: 3 items recalled: COGNITIVE IMPAIRMENT LESS LIKELY    Mini-CogTM Copyright S Imer. Licensed by the author for use in [facility]; reprinted with permission " "(arabella@[facility]). All rights reserved.      Do you have sleep apnea, excessive snoring or daytime drowsiness?: no          Diabetes Follow-up    How often are you checking your blood sugar? One time daily  What time of day are you checking your blood sugars (select all that apply)?  Before meals  Have you had any blood sugars above 200?  No  Have you had any blood sugars below 70?  No    What symptoms do you notice when your blood sugar is low?  None    What concerns do you have today about your diabetes? Other: developing peripheral neuropathy     Do you have any of these symptoms? (Select all that apply)  Burning in feet     Have you had a diabetic eye exam in the last 12 months? March 2019   90 day blood sugar average 140 and 30 day average is 125.  Did have an epidural steroid injection during his 90  day period.         Diabetes Management Resources    Hyperlipidemia Follow-Up      Are you regularly taking any medication or supplement to lower your cholesterol?   Yes- simvastatin    Are you having muscle aches or other side effects that you think could be caused by your cholesterol lowering medication?  No    Hypertension Follow-up      Do you check your blood pressure regularly outside of the clinic? No     Are you following a low salt diet? Yes    Are your blood pressures ever more than 140 on the top number (systolic) OR more   than 90 on the bottom number (diastolic), for example 140/90? No     Has six month history of \"torniquet\" type pain in his left upper arm.  This lasts 4-5 seconds and resolves.  No exacerbation with activity and in fact, it may get a little better with activity.      BP Readings from Last 2 Encounters:   12/30/19 128/72   10/18/19 135/79     Hemoglobin A1C (%)   Date Value   10/03/2019 7.5 (H)   06/26/2019 8.1 (H)     LDL Cholesterol Calculated (mg/dL)   Date Value   06/26/2019 83   09/24/2018 66         Reviewed and updated as needed this visit by clinical staff  Tobacco  Allergies  " "Meds  Soc Hx        Reviewed and updated as needed this visit by Provider        Social History     Tobacco Use     Smoking status: Former Smoker     Packs/day: 0.50     Years: 50.00     Pack years: 25.00     Types: Cigarettes     Start date: 1962     Last attempt to quit: 2012     Years since quittin.0     Smokeless tobacco: Never Used   Substance Use Topics     Alcohol use: Yes     Comment: rare                           Current providers sharing in care for this patient include:   Patient Care Team:  Tj Verma Jr., MD as PCP - General (Family Practice)  Tj Verma Jr., MD as Assigned PCP    The following health maintenance items are reviewed in Epic and correct as of today:  Health Maintenance   Topic Date Due     ZOSTER IMMUNIZATION (2 of 3) 2012     MEDICARE ANNUAL WELLNESS VISIT  10/01/2016     A1C  2020     EYE EXAM  2020     BMP  2020     LIPID  2020     MICROALBUMIN  2020     DIABETIC FOOT EXAM  2020     FALL RISK ASSESSMENT  2020     TSH W/FREE T4 REFLEX  2021     ADVANCE CARE PLANNING  2022     ECHO COMPLETE  2022     DTAP/TDAP/TD IMMUNIZATION (3 - Td) 2022     COLONOSCOPY  2026     SPIROMETRY  Completed     HEPATITIS C SCREENING  Completed     COPD ACTION PLAN  Completed     PHQ-2  Completed     INFLUENZA VACCINE  Completed     PNEUMOCOCCAL IMMUNIZATION 65+ LOW/MEDIUM RISK  Completed     AORTIC ANEURYSM SCREENING (SYSTEM ASSIGNED)  Completed     IPV IMMUNIZATION  Aged Out     MENINGITIS IMMUNIZATION  Aged Out     Lab work is in process  Labs reviewed in EPIC      ROS:  Constitutional, HEENT, cardiovascular, pulmonary, gi and gu systems are negative, except as otherwise noted.    OBJECTIVE:   There were no vitals taken for this visit. Estimated body mass index is 26.5 kg/m  as calculated from the following:    Height as of 19: 1.803 m (5' 11\").    Weight as of 19: 86.2 kg (190 " lb).  EXAM:   GENERAL: healthy, alert and no distress  EYES: Eyes grossly normal to inspection, PERRL and conjunctivae and sclerae normal  HENT: ear canals and TM's normal, nose and mouth without ulcers or lesions  NECK: no adenopathy, no asymmetry, masses, or scars and thyroid normal to palpation  RESP: lungs clear to auscultation - no rales, rhonchi or wheezes  CV: regular rate and rhythm, normal S1 S2, no S3 or S4, II/VI systolic murmur RUSB, click or rub, no peripheral edema and peripheral pulses strong  ABDOMEN: soft, nontender, no hepatosplenomegaly, no masses and bowel sounds normal  MS: no gross musculoskeletal defects noted, no edema  SKIN: no suspicious lesions or rashes  NEURO: Normal strength and tone, mentation intact and speech normal  PSYCH: mentation appears normal, affect normal/bright  Diabetic foot exam: normal DP and PT pulses, no trophic changes or ulcerative lesions and normal monofilament exam, except for findings noted on diabetic foot graphical image: Right 1, 4, 9.  Left 9.              Diagnostic Test Results:  Labs reviewed in Epic  No results found for this or any previous visit (from the past 24 hour(s)).    ASSESSMENT / PLAN:   1. Encounter for Medicare annual wellness exam  up to date with preventative care measures.  Encouraged continued good diet and exercise program as he's walking 1-2 times every day.  Has a walking group he meets with 3 days per week.      2. Advanced directives, counseling/discussion  Believes he has an advanced directive at home.  Will search for copy and bring it in.  otherwise given information on completing advanced directive today.    3. Type 2 diabetes mellitus without complication, without long-term current use of insulin (H)  On triple therapy.  Hasn't tolerated higher doses of metformin.  Does note sitagliptin is costly, but reports he can afford it.  Recheck in 6 months.   - Hemoglobin A1c  - glimepiride (AMARYL) 4 MG tablet; Take 1 tablet (4 mg) by  "mouth every morning (before breakfast)  Dispense: 90 tablet; Refill: 1  - metFORMIN (GLUCOPHAGE-XR) 500 MG 24 hr tablet; Take 1 tablet (500 mg) by mouth 2 times daily (with meals)  Dispense: 180 tablet; Refill: 1  - sitagliptin (JANUVIA) 25 MG tablet; Take 1 tablet (25 mg) by mouth daily  Dispense: 90 tablet; Refill: 1    4. Essential hypertension  Blood pressure at goal. On AARB in addition to diuretic.  Renal function stable.   - hydrochlorothiazide (HYDRODIURIL) 25 MG tablet; Take 1 tablet (25 mg) by mouth daily  Dispense: 90 tablet; Refill: 1    5. Nonrheumatic aortic insufficiency with aortic stenosis  Seen on echo earlier this year and is mild.  Plan to repeat echo in 3-5 years.  Refill NTG given that his present Rx is 7 years old.   - nitroGLYcerin (NITROSTAT) 0.4 MG sublingual tablet; Place 1 tablet (0.4 mg) under the tongue every 5 minutes as needed for chest pain  Dispense: 25 tablet; Refill: 0    6. Pain of left upper arm  Not clear on etiology of this, but doubt it represents coronary artery disease as there's no exertional provocation, no other accompanying symptoms (diaphoresis, chest pain, shortness of breath, etc) and it lasts only 4-5 seconds and resolves spontaneously.  Will continue to follow clinically.       COUNSELING:  Reviewed preventive health counseling, as reflected in patient instructions    Estimated body mass index is 26.5 kg/m  as calculated from the following:    Height as of 6/26/19: 1.803 m (5' 11\").    Weight as of 6/26/19: 86.2 kg (190 lb).         reports that he quit smoking about 8 years ago. His smoking use included cigarettes. He started smoking about 57 years ago. He has a 25.00 pack-year smoking history. He has never used smokeless tobacco.      Appropriate preventive services were discussed with this patient, including applicable screening as appropriate for cardiovascular disease, diabetes, osteopenia/osteoporosis, and glaucoma.  As appropriate for age/gender, discussed " screening for colorectal cancer, prostate cancer, breast cancer, and cervical cancer. Checklist reviewing preventive services available has been given to the patient.    Reviewed patients plan of care and provided an AVS. The Basic Care Plan (routine screening as documented in Health Maintenance) for Jaylen meets the Care Plan requirement. This Care Plan has been established and reviewed with the Patient.    Counseling Resources:  ATP IV Guidelines  Pooled Cohorts Equation Calculator  Breast Cancer Risk Calculator  FRAX Risk Assessment  ICSI Preventive Guidelines  Dietary Guidelines for Americans, 2010  USDA's MyPlate  ASA Prophylaxis  Lung CA Screening    Tj Verma Jr, MD  [facility]

## 2019-12-30 NOTE — RESULT ENCOUNTER NOTE
Mr. De La Vega,    -A1C test (average blood sugar the last 2-3 months) is above your goal. We want this less than 8, Nik.    ADVISE: making a diabetic followup appointment in 12 weeks which is sooner than we usually see each other. Please check and record your blood sugars at least 4 times daily for 1 week prior to your appointment and bring for review.  This will help us determine when your sugars are high.  Also, you should recheck your A1C in 3 months.    If you have further questions about the interpretation of your labs, [email or web address].org is a good website to check out for further information.    Please contact the clinic if you have additional questions.  Thank you.    Sincerely,    Tj Verma MD

## 2020-01-23 ENCOUNTER — MYC MEDICAL ADVICE (OUTPATIENT)
Dept: FAMILY MEDICINE | Facility: CLINIC | Age: 75
End: 2020-01-23

## 2020-01-23 DIAGNOSIS — M51.379 DEGENERATION OF LUMBAR OR LUMBOSACRAL INTERVERTEBRAL DISC: Primary | ICD-10-CM

## 2020-01-24 ENCOUNTER — TELEPHONE (OUTPATIENT)
Dept: PALLIATIVE MEDICINE | Facility: CLINIC | Age: 75
End: 2020-01-24

## 2020-01-24 NOTE — TELEPHONE ENCOUNTER
"Pre-screening Questions for Radiology Injections:    Injection to be done at which interventional clinic site? [facility]    Instruct patient to arrive as directed prior to the scheduled appointment time:    [facility] 30 minutes before      [place]: 30 minutes before; if IV needed 1 hour before     Dr. Hernandez-no IV needed for Cervical SUSIE; please instruct to arrive 30\" early    Procedure ordered by Verma    Procedure ordered? LESI      Transforaminal Cervical SUSIE - no pain provider currently performing    What insurance would patient like us to bill for this procedure? [employer]       Worker's comp or MVA (motor vehicle accident) -Any injection DO NOT SCHEDULE and route to Mary Oh.      [employer] insurance - For SI joint injections, DO NOT SCHEDULE and route Mary Oh.       Humana - Any injection besides hip/shoulder/knee joint DO NOT SCHEDULE and route to Mary Oh. She will obtain PA and call pt back to schedule procedure or notify pt of denial.       HP CIGNA-Route to [place] for review      **BCBS- ALL need to be routed to [place] for review if a PA is needed**      IF SCHEDULING IN [place] AND NEEDS A PA, IT IS OKAY TO SCHEDULE. [place] HANDLES THEIR OWN PA'S AFTER THE PATIENT IS SCHEDULED. PLEASE SCHEDULE AT LEAST 1 WEEK OUT SO A PA CAN BE OBTAINED.    Any chance of pregnancy? Not Applicable   If YES, do NOT schedule and route to RN pool    Is an  needed? No     Patient has a drive home? (mandatory) YES: informed     Is patient taking any blood thinners (i.e. plavix, coumadin, jantoven, warfarin, heparin, pradaxa or dabigatran, etc)? No   If hold needed, do NOT schedule, route to RN pool     Is patient taking any aspirin products (includes Excedrin and Fiorinal)? Yes - Pt takes 81mg daily; instructed to hold 0 day(s) prior to procedure.      If more than 325mg/day do NOT schedule; route to RN pool     For CERVICAL procedures, hold all aspirin products for 6 days.     Tell " pt that if aspirin product is not held for 6 days, the procedure WILL BE cancelled.      Does the patient have a bleeding or clotting disorder? No     If YES, okay to schedule AND route to RN nurse pool    For any patients with platelet count <100, must be forwarded to provider    Is patient diabetic?  Yes If YES, instruct them to bring their glucometer.    Does patient have an active infection or treated for one within the past week? No     Is patient currently taking any antibiotics?  No     For patients on chronic, preventative, or prophylactic antibiotics, procedures may be scheduled.     For patients on antibiotics for active or recent infection:antibiotic course must have been completed for 4 days    Is patient currently taking any steroid medications? (i.e. Prednisone, Medrol)  No     For patients on steroid medications, course must have been completed for 4 days    Reviewed with patient:  If you are started on any steroids or antibiotics between now and your appointment, you must contact us because the procedure may need to be cancelled.  Yes    Is patient actively being treated for cancer or immunocompromised? No  If YES, do NOT schedule and route to RN pool     Are you able to get on and off an exam table with minimal or no assistance? Yes  If NO, do NOT schedule and route to RN pool    Are you able to roll over and lay on your stomach with minimal or no assistance? Yes  If NO, do NOT schedule and route to RN pool     Any allergies to contrast dye, iodine, shellfish, or numbing and steroid medications? No  If YES, route to RN pool AND add allergy information to appointment notes    Allergies: No known allergies      Has the patient had a flu shot or any other vaccinations within 7 days before or after the procedure.  No     Does patient have an MRI/CT?  YES: 2019  Check Procedure Scheduling Grid to see if required.      Was the MRI done within the last 3 years?  Yes    If yes, where was the MRI done  i.e.[facility], [facility], [place], [facility] etc?       If no, do not schedule and route to RN pool    If MRI was not done at [place], [facility] or [facility] do NOT schedule and route to RN pool.      If pt has an imaging disc, the injection MAY be scheduled but pt has to bring disc to appt.     If they show up without the disc the injection cannot be done    Reminders (please tell patient if applicable):       Instructed pt to arrive 30 minutes early for IV start if required. (Check Procedure Scheduling Grid)  Not Applicable      If celiac plexus block, informed patient NPO for 6 hours and that it is okay to take medications with sips of water, especially blood pressure medications  Not Applicable         If this is for a cervical procedure, informed patient that aspirin needs to be held for 6 days.   Not Applicable      For all patients not having spinal cord stimulator (SCS) trials or radiofrequency ablations (RFAs), informed patient:    IV sedation is not provided for this procedure.  If you feel that an oral anti-anxiety medication is needed, you can discuss this further with your referring provider or primary care provider.  The Pain Clinic provider will discuss specifics of what the procedure includes at your appointment.  Most procedures last 10-20 minutes.  We use numbing medications to help with any discomfort during the procedure.  Not Applicable      Do not schedule procedures requiring IV placement in the first appointment of the day or first appointment after lunch. Do NOT schedule at 0745, 0815 or 1245.       For patients 85 or older we recommend having an adult stay w/ them for the remainder of the day.       Does the patient have any questions?  NO  Mary Fair  [facility]

## 2020-01-24 NOTE — TELEPHONE ENCOUNTER
Auth No / Request ID [id number]  Status Auto-Approved  Decision Approved  Effective Date 01/24/2020  Expiration Date 03/09/2020  Decision Date 01/24/2020          Junior to schedule        Mary CHAPPELL    [facility]

## 2020-01-24 NOTE — TELEPHONE ENCOUNTER
Orders reviewed and signed.  Pain clinic to reach out to Nik to get his epidural steroid injection scheduled.    Tj Verma MD

## 2020-01-27 NOTE — PROGRESS NOTES
[facility] - Procedure Note    Date of Service: 1/29/2020    Procedure performed: Left L5-S1 transforaminal epidural steroid injection with fluoroscopic guidance  Diagnosis: Lumbar spondylosis; Lumbar radiculitis/radiculopathy  : Iman Olvera MD & Maylin Olson MD (pain fellow)   Anesthesia: none    Indications: Jaylen De La Vega is a 74 year old male who is seen at the request of Dr. Verma for lumbar transforaminal epidural steroid injection. The patient describes LEFT sided low back pain radiating to the calf and ankle on that side. The patient has been exhibiting symptoms consistent with lumbar intraspinal inflammation and radiculopathy. Symptoms have been persistent, disabling, and intermittently severe. The patient reports minimal improvement with conservative treatment, including PT and medications.    This is a repeat injection.  Previous Left L5-S1 transforaminal epidural steroid injection done by myself on 10/18/2019 & 7/3/2019 provided good pain relief for a period of 2-3 months.       Lumbar MRI was done on 6/27/2019 which showed:   FINDINGS: There are 5 lumbar-type vertebral bodies assumed for the  purposes of this dictation with a well-formed disc space between S1  and S2. The tip of the conus terminates at the level of L1-L2.      Alignment of the lumbar spine is normal. No loss of vertebral body  height. There are no destructive osseous lesions. Severe loss of  intervertebral disc height with disc desiccation and Modic-type  degenerative endplate changes at L5-S1. Moderate loss of disc height  and degenerative changes at L4-L5. Mild disc desiccation at L1-L2.  Focal Schmorl's node type deformity with associated edema in the  inferior L5 endplate. Small Schmorl's node type deformity at the  inferior L1 endplate.      Level by level as follows:      T12-L1: No significant disc herniation. No spinal canal or neural  foraminal narrowing.      L1-L2: No  significant disc herniation. No spinal canal or neural  foraminal narrowing.      L2-L3: No significant disc herniation. No spinal canal or neural  foraminal narrowing.      L3-L4: No significant disc herniation. No spinal canal or neural  foraminal narrowing.      L4-L5: Marked circumferential disc bulge and endplate osteophytic  spurring. Mild facet hypertrophy. Findings result in mild-to-moderate  bilateral neural foraminal narrowing. Mild central spinal canal  narrowing.      L5-S1: Marked circumferential disc bulge with endplate osteophytic  spurring. Superimposed left subarticular disc extrusion extending  caudally to the upper third of the S1 vertebral body. Mild-to-moderate  bilateral neural foraminal narrowing, right greater than left. Disc  herniation may slightly impinge the traversing left S1 nerve root in  the lateral recess.      Paraspinous soft tissues: Normal.                                                                    IMPRESSION:  Degenerative changes in the lumbar spine as described  above most pronounced at the L4-L5 and L5-S1 levels. Degenerative  changes have slightly progressed since previous MR.    Allergies:      Allergies   Allergen Reactions     No Known Allergies         Vitals:  /77   Pulse 97   SpO2 95%     Review of Systems: The patient denies recent fever, chills, illness, use of antibiotics or anticoagulants. All other 10-point review of systems negative.     Procedure: The procedure and risks were explained, and informed written consent was obtained from the patient. Risks include but are not limited to: infection, bleeding, increased pain, and damage to soft tissue, nerve, muscle, and vasculature structures. After getting informed consent, patient was brought into the procedure suite and was placed in a prone position on the procedure table. A Pause for the Cause was performed. Patient was prepped and draped in sterile fashion.     After identifying the left L5-S1  neuroforamen, the C-arm was rotated to a left lateral oblique angle.  A total of 4.5 mL of Lidocaine 1% was used to anesthetize the skin and the needle track at a skin entry site coaxial with the fluoroscopy beam, and overriding the superior aspect of the neuroforamen.  A 22 gauge 5 inch spinal needle was advanced under intermittent fluoroscopy until it entered the foramen superiorly.    The position was then inspected from anteroposterior and lateral views, and the needle adjusted appropriately.  After negative aspiration, a total of 1 mL of Omnipaque-300 was injected using static and continuous fluoroscopy confirming appropriate position, with spread along the nerve root sheath and into the epidural space, with no intravascular or intrathecal uptake. 9 mL of Omnipaque-300 was wasted.    2mL of 1% lidocaine with 20 mg of dexamethasone was injected.  The needle was removed. Hemostasis was achieved, the area was cleaned, and bandaids were placed when appropriate. Images were saved to PACS.    The patient tolerated the procedure well, and was taken to the recovery room, and there was no evidence of procedural complications. No new sensory or motor deficits were noted following the procedure. The patient was stable and able to ambulate on discharge home. Post-procedure instructions were provided.     Pre-procedure pain score: 0/10 in the back, 6/10 in the leg  Post-procedure pain score: 0/10 in the back, 1/10 in the leg    Assessment/Plan: Jaylen De La Vega is a 74 year old male s/p Left L5-S1 transforaminal epidural steroid injection today for lumbar spondylosis, radiculitis/radiculopathy.     1. Following today's procedure, the patient was advised to contact the [facility] for any of the following:   Fever, chills, or night sweats   New onset of pain, numbness, or weakness   Any questions/concerns regarding the procedure  If unable to contact the Pain Center, the patient was instructed to go to a  local Emergency Room for any complications.   2. The patient will receive a follow-up call in 1 week.  3. The patient should follow-up with the referring provider in 2 weeks for post-procedure evaluation.      BEST ANDRADE MD   Pain Management & Addiction Medicine

## 2020-01-29 ENCOUNTER — ANCILLARY PROCEDURE (OUTPATIENT)
Dept: GENERAL RADIOLOGY | Facility: CLINIC | Age: 75
End: 2020-01-29
Attending: ANESTHESIOLOGY
Payer: COMMERCIAL

## 2020-01-29 ENCOUNTER — RADIOLOGY INJECTION OFFICE VISIT (OUTPATIENT)
Dept: PALLIATIVE MEDICINE | Facility: CLINIC | Age: 75
End: 2020-01-29
Payer: COMMERCIAL

## 2020-01-29 VITALS — HEART RATE: 100 BPM | SYSTOLIC BLOOD PRESSURE: 135 MMHG | OXYGEN SATURATION: 93 % | DIASTOLIC BLOOD PRESSURE: 78 MMHG

## 2020-01-29 DIAGNOSIS — M54.16 LUMBAR RADICULOPATHY: ICD-10-CM

## 2020-01-29 DIAGNOSIS — M54.16 LUMBAR RADICULOPATHY: Primary | ICD-10-CM

## 2020-01-29 PROCEDURE — 64483 NJX AA&/STRD TFRM EPI L/S 1: CPT | Mod: LT | Performed by: ANESTHESIOLOGY

## 2020-01-29 NOTE — PATIENT INSTRUCTIONS
[facility] Procedure Discharge Instructions    Today you saw:   Dr. Iman Olvera      Your procedure:  Epidural steroid injection       Medications used:  Lidocaine (anesthetic)  Dexamethasone (steroid)     Omnipaque (contrast)                   Be cautious when walking as numbness and/or weakness in the legs may occur up to 6-8 hours after the procedure due to effect of the local anesthetic    Do not drive for 6 hours. The effect of the local anesthetic could slow your reflexes.     Avoid strenuous activity for the first 24 hours. You may resume your regular activities after that.     You may shower, however avoid swimming, tub baths or hot tubs for 24 hours following your procedure    You may have a mild to moderate increase in pain for several days following the injection.      You may use ice packs for 10-15 minutes, 3 to 4 times a day at the injection site for comfort    Do not use heat to painful areas for 6 to 8 hours. This will give the local anesthetic time to wear off and prevent you from accidentally burning your skin.    Unless you have been directed to avoid the use of anti-inflammatory medications (NSAIDS-ibuprofen, Aleve, Motrin), you may use these medications or Tylenol for pain control if needed.     With diabetes, check your blood sugar more frequently than usual as your blood sugar may be higher than normal for 10-14 days following a steroid injection. Contact your doctor who manages your diabetes if your blood sugar is higher than usual    Possible side effects of steroids that you may experience include flushing, elevated blood pressure, increased appetite, mild headaches and restlessness.  All of these symptoms will get better with time.    It may take up to 14 days for the steroid medication to start working although you may feel the effect as early as a few days after the procedure.     Follow up with your referring provider in 2-3 weeks      If you experience any of the  following, call the pain center line during work hours at [phone] or on-call physician after hours at [phone]:  -Fever over 100 degree F  -Swelling, bleeding, redness, drainage, warmth at the injection site  -Progressive weakness or numbness in your legs   -Loss of bowel or bladder function  -Unusual headache that is not relieved by Tylenol or your regular headache medication  -Unusual new onset of pain that is not improving

## 2020-01-29 NOTE — NURSING NOTE
Discharge Information    IV Discontiued Time:  NA    Amount of Fluid Infused:  NA    Discharge Criteria = When patient returns to baseline or as per MD order    Consciousness:  Pt is fully awake    Circulation:  BP +/- 20% of pre-procedure level    Respiration:  Patient is able to breathe deeply    O2 Sat:  Patient is able to maintain O2 Sat >92% on room air    Activity:  Moves 4 extremities on command    Ambulation:  Patient is able to stand and walk or stand and pivot into wheelchair    Dressing:  Clean/dry or No Dressing    Notes:   Discharge instructions and AVS given to patient    Patient meets criteria for discharge?  YES    Admitted to PCU?  No    Responsible adult present to accompany patient home?  Yes    Signature/Title:    Kenia Lima RN Care Coordinator  [facility] [place]

## 2020-01-29 NOTE — NURSING NOTE
Pre-procedure Intake    Have you been fasting? NA    If yes, for how long?     Are you taking a prescribed blood thinner such as coumadin, Plavix, Xarelto?    No    If yes, when did you take your last dose?     Do you take aspirin?  Yes -   ASA    If cervical procedure, have you held aspirin for 6 days?   NA    Do you have any allergies to contrast dye, iodine, steroid and/or numbing medications?  NO    Are you currently taking antibiotics or have an active infection?  NO    Have you had a fever/elevated temperature within the past week? NO    Are you currently taking oral steroids? NO    Do you have a ? Yes       Are you pregnant or breastfeeding?  Not Applicable    Are the vital signs normal?  No: pulse:97

## 2020-03-15 ENCOUNTER — MYC REFILL (OUTPATIENT)
Dept: FAMILY MEDICINE | Facility: CLINIC | Age: 75
End: 2020-03-15

## 2020-03-15 DIAGNOSIS — J44.9 CHRONIC OBSTRUCTIVE PULMONARY DISEASE, UNSPECIFIED COPD TYPE (H): ICD-10-CM

## 2020-03-16 NOTE — TELEPHONE ENCOUNTER
"Requested Prescriptions   Pending Prescriptions Disp Refills     umeclidinium (INCRUSE ELLIPTA) 62.5 MCG/INH inhaler  Last Written Prescription Date:  11/4/2019  Last Fill Quantity: 60 each,  # refills: 0   Last office visit: 12/30/2019 with prescribing provider:  Bayron     Future Office Visit:       60 each 0       Asthma Maintenance Inhalers - Anticholinergics Failed - 3/16/2020 12:12 PM        Failed - Asthma control assessment score within normal limits in last 6 months     Please review ACT score.     No flowsheet data found.            Passed - Patient is age 12 years or older        Passed - Medication is active on med list        Passed - Recent (6 mo) or future (30 days) visit within the authorizing provider's specialty     Patient had office visit in the last 6 months or has a visit in the next 30 days with authorizing provider or within the authorizing provider's specialty.  See \"Patient Info\" tab in inbasket, or \"Choose Columns\" in Meds & Orders section of the refill encounter.               "

## 2020-03-17 ENCOUNTER — MYC REFILL (OUTPATIENT)
Dept: FAMILY MEDICINE | Facility: CLINIC | Age: 75
End: 2020-03-17

## 2020-03-17 DIAGNOSIS — J44.9 CHRONIC OBSTRUCTIVE PULMONARY DISEASE, UNSPECIFIED COPD TYPE (H): ICD-10-CM

## 2020-03-17 NOTE — TELEPHONE ENCOUNTER
Asthma is not on problem list. Associated diagnosis is COPD.   Routing refill request to provider for review/approval because:  A break in medication  Cornelia Chaney RN

## 2020-06-05 ENCOUNTER — TELEPHONE (OUTPATIENT)
Dept: FAMILY MEDICINE | Facility: CLINIC | Age: 75
End: 2020-06-05

## 2020-06-05 DIAGNOSIS — E78.5 HYPERLIPIDEMIA LDL GOAL <70: ICD-10-CM

## 2020-06-05 RX ORDER — SIMVASTATIN 20 MG
20 TABLET ORAL AT BEDTIME
Qty: 30 TABLET | Refills: 1 | Status: SHIPPED | OUTPATIENT
Start: 2020-06-05 | End: 2020-07-15

## 2020-06-05 NOTE — TELEPHONE ENCOUNTER
Medication is being filled for 1 time refill only due to:  Patient needs to be seen because due for diabetic follow up and lipid labs.     30 day with 1 refill provided. Will route to  to contact patient for follow up scheduling. Kathleen Faust R.N.

## 2020-06-08 ENCOUNTER — MYC MEDICAL ADVICE (OUTPATIENT)
Dept: FAMILY MEDICINE | Facility: CLINIC | Age: 75
End: 2020-06-08

## 2020-06-08 DIAGNOSIS — I49.1 PREMATURE ATRIAL CONTRACTIONS: ICD-10-CM

## 2020-06-08 DIAGNOSIS — E78.5 HYPERLIPIDEMIA LDL GOAL <70: Primary | ICD-10-CM

## 2020-06-08 DIAGNOSIS — E11.9 TYPE 2 DIABETES MELLITUS WITHOUT COMPLICATION, WITHOUT LONG-TERM CURRENT USE OF INSULIN (H): ICD-10-CM

## 2020-06-09 NOTE — TELEPHONE ENCOUNTER
Ron called to schedule a diabetes check for July or Aug. With Dr Verma. He would also like a lab only about a week ahead of the appt.    Call him on his cell phone: [phone]    Thank you,  Lottie Farfan  Patient Representative

## 2020-06-09 NOTE — TELEPHONE ENCOUNTER
Let's see Nik on Thursday 3:20-4pm.  I'll enter labs today if he'd like to get them done tomorrow.     Please call patient.     Tj Verma MD

## 2020-06-09 NOTE — TELEPHONE ENCOUNTER
Called Nik and made appts for 7/10 for labs and 7/15 with MD TARUN.    Nik also mentioned he has had some issues with his pulse.  He has a pulse ox meter he uses and his O2 is good, but his pulse has been between  when at rest.  He walks a lot and when he comes home from a walk, it is about 125-130 and he feels he morally runs about 70.  I asked if he is going on a brisk walk or a casual stroll.  He states at this point it is a casual stroll and that sometimes, after walking about a mile or 2, he comes home and has no energy and has even fallen asleep a few times.  Routing to PCP.  Do you want him added to your schedule on Thursday?  Shagufta Wilson

## 2020-06-10 NOTE — TELEPHONE ENCOUNTER
Devan moore.  Shagufta Wilson       Patient noted to have persistent leukocytosis. Source of infection thought to be due to sacral ulcer leading to osteomyelitis.  Plan per primary/ID team.  Adjust antibiotics as per renal dose clearance.  Family meeting planned for possibly Monday.

## 2020-06-10 NOTE — TELEPHONE ENCOUNTER
I looked over his chart and he has a history of PAC's so I wonder if he's feeling these.  They're typically nothing to worry about now that we've identified them.  It would be nice, despite this, to have Nik triaged to make sure he's not having chest pain or shortness of breath associated with this as that would merit him being seen this week.    Tj Verma MD

## 2020-06-10 NOTE — TELEPHONE ENCOUNTER
Called pt to offer appt tomorrow.  He has his DM eye exam at noon tomorrow so does not think he will make it back in time for this appt.  He feels he can wait until the appt I have on 7/15, but if something opens up sooner, he will take it.  Routing to PCP to advise.  I can watch the schedules for changes so I can get him seen sooner.  Do you want his triaged as well to make sure he can wait til July 15?

## 2020-06-10 NOTE — TELEPHONE ENCOUNTER
He wore a 48 hour Holter in April 2017 where we discovered his frequent PAC's.    Given his description of chest pain and shortness of breath it certainly could be his underlying asthma, but could be something more.  I think we need to have Nik seen this week or next week for a CXR, ECG and physical exam.  May need a stress test to help us differentiate PAC's vs. Asthma vs. coronary artery disease as the cause of his symptoms. He's OK to see another provider in [place] as I'll only be doing virtual visits next week.    Please call patient.     Tj Verma MD

## 2020-06-10 NOTE — TELEPHONE ENCOUNTER
I called Nik and spoke to him. Nik feels like after his walks he feels tired and he didn't used to. He does not walk fast he reports. Pulse general runs 95 at rest and . After walk is 125 or so, stays at that rate for 5 min then returns to resting rate.     If any exercise during the day, he lays down for 15-20 and feels better sometimes falls asleep. This can happen a couple of times per day if he exercises. He does things like lifts weights or housecleaning.     Is having a little mild SOB he says has chronic lung issues. Is associated with his elevated pulse rate. Has come on over time so he didn't really notice it come on suddenly and became his new normal.  Gets a little sweaty at times he says.     Says has had chest pain at times that he thought was heartburn. Not related to activity. Once about 4 months ago had some chest pain and took an ASA and it went away--he points . He says he wasn't concerned about it thinking it was heartburn. Has not occurred since 4 mos ago which was only 3 or 4th time he ever had. Said it was very mild tinge and not a tightening.     When I asked he says he has not worn an event or Holter monitor in past.     Routing to Dr. Verma for further advisement. Kathleen Faust R.N.

## 2020-06-11 ENCOUNTER — TRANSFERRED RECORDS (OUTPATIENT)
Dept: HEALTH INFORMATION MANAGEMENT | Facility: CLINIC | Age: 75
End: 2020-06-11

## 2020-06-11 LAB — RETINOPATHY: NEGATIVE

## 2020-06-11 NOTE — TELEPHONE ENCOUNTER
Tried to call pt a few times this AM.  The phone automatically connect each time and I can hear him talking to someone else, but he cannot hear me.  I wonder if his phone is answering while he is on a walk.  Will try again in a bit.  Shagufta Wilson

## 2020-06-12 ENCOUNTER — ANCILLARY PROCEDURE (OUTPATIENT)
Dept: GENERAL RADIOLOGY | Facility: CLINIC | Age: 75
End: 2020-06-12
Attending: NURSE PRACTITIONER
Payer: COMMERCIAL

## 2020-06-12 ENCOUNTER — OFFICE VISIT (OUTPATIENT)
Dept: FAMILY MEDICINE | Facility: CLINIC | Age: 75
End: 2020-06-12
Payer: COMMERCIAL

## 2020-06-12 VITALS
WEIGHT: 189 LBS | BODY MASS INDEX: 26.46 KG/M2 | TEMPERATURE: 97.3 F | HEIGHT: 71 IN | OXYGEN SATURATION: 93 % | HEART RATE: 92 BPM | DIASTOLIC BLOOD PRESSURE: 88 MMHG | SYSTOLIC BLOOD PRESSURE: 142 MMHG

## 2020-06-12 DIAGNOSIS — R06.02 SHORTNESS OF BREATH: ICD-10-CM

## 2020-06-12 DIAGNOSIS — I49.1 PREMATURE ATRIAL CONTRACTIONS: ICD-10-CM

## 2020-06-12 DIAGNOSIS — J44.9 CHRONIC OBSTRUCTIVE PULMONARY DISEASE, UNSPECIFIED COPD TYPE (H): ICD-10-CM

## 2020-06-12 DIAGNOSIS — R00.2 PALPITATIONS: Primary | ICD-10-CM

## 2020-06-12 DIAGNOSIS — R07.9 CHEST PAIN, UNSPECIFIED TYPE: ICD-10-CM

## 2020-06-12 DIAGNOSIS — E11.9 TYPE 2 DIABETES MELLITUS WITHOUT COMPLICATION, WITHOUT LONG-TERM CURRENT USE OF INSULIN (H): ICD-10-CM

## 2020-06-12 DIAGNOSIS — E78.5 HYPERLIPIDEMIA LDL GOAL <70: ICD-10-CM

## 2020-06-12 LAB
BASOPHILS # BLD AUTO: 0 10E9/L (ref 0–0.2)
BASOPHILS NFR BLD AUTO: 0.6 %
DIFFERENTIAL METHOD BLD: NORMAL
EOSINOPHIL # BLD AUTO: 0.1 10E9/L (ref 0–0.7)
EOSINOPHIL NFR BLD AUTO: 1.2 %
ERYTHROCYTE [DISTWIDTH] IN BLOOD BY AUTOMATED COUNT: 13.7 % (ref 10–15)
HBA1C MFR BLD: 8.2 % (ref 0–5.6)
HCT VFR BLD AUTO: 48.1 % (ref 40–53)
HGB BLD-MCNC: 15.8 G/DL (ref 13.3–17.7)
LYMPHOCYTES # BLD AUTO: 2.5 10E9/L (ref 0.8–5.3)
LYMPHOCYTES NFR BLD AUTO: 38.1 %
MCH RBC QN AUTO: 28.1 PG (ref 26.5–33)
MCHC RBC AUTO-ENTMCNC: 32.8 G/DL (ref 31.5–36.5)
MCV RBC AUTO: 86 FL (ref 78–100)
MONOCYTES # BLD AUTO: 0.7 10E9/L (ref 0–1.3)
MONOCYTES NFR BLD AUTO: 10 %
NEUTROPHILS # BLD AUTO: 3.3 10E9/L (ref 1.6–8.3)
NEUTROPHILS NFR BLD AUTO: 50.1 %
PLATELET # BLD AUTO: 226 10E9/L (ref 150–450)
RBC # BLD AUTO: 5.62 10E12/L (ref 4.4–5.9)
WBC # BLD AUTO: 6.6 10E9/L (ref 4–11)

## 2020-06-12 PROCEDURE — 83036 HEMOGLOBIN GLYCOSYLATED A1C: CPT | Performed by: FAMILY MEDICINE

## 2020-06-12 PROCEDURE — 80053 COMPREHEN METABOLIC PANEL: CPT | Performed by: FAMILY MEDICINE

## 2020-06-12 PROCEDURE — 36415 COLL VENOUS BLD VENIPUNCTURE: CPT | Performed by: FAMILY MEDICINE

## 2020-06-12 PROCEDURE — 99214 OFFICE O/P EST MOD 30 MIN: CPT | Performed by: NURSE PRACTITIONER

## 2020-06-12 PROCEDURE — 82043 UR ALBUMIN QUANTITATIVE: CPT | Performed by: FAMILY MEDICINE

## 2020-06-12 PROCEDURE — 93000 ELECTROCARDIOGRAM COMPLETE: CPT | Performed by: NURSE PRACTITIONER

## 2020-06-12 PROCEDURE — 80061 LIPID PANEL: CPT | Performed by: FAMILY MEDICINE

## 2020-06-12 PROCEDURE — 84443 ASSAY THYROID STIM HORMONE: CPT | Performed by: FAMILY MEDICINE

## 2020-06-12 PROCEDURE — 71046 X-RAY EXAM CHEST 2 VIEWS: CPT | Mod: FY

## 2020-06-12 PROCEDURE — 85025 COMPLETE CBC W/AUTO DIFF WBC: CPT | Performed by: FAMILY MEDICINE

## 2020-06-12 RX ORDER — ALBUTEROL SULFATE 90 UG/1
2 AEROSOL, METERED RESPIRATORY (INHALATION) EVERY 6 HOURS PRN
Qty: 1 INHALER | Refills: 3 | Status: SHIPPED | OUTPATIENT
Start: 2020-06-12 | End: 2021-05-26

## 2020-06-12 ASSESSMENT — MIFFLIN-ST. JEOR: SCORE: 1624.43
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
## 2020-06-12 NOTE — RESULT ENCOUNTER NOTE
Dear Nik,     The radiologist agreed that your chest x-ray was overall reassuring.      See below for reading:      IMPRESSION: There are no acute infiltrates. The cardiac silhouette is  not enlarged. Pulmonary vasculature is unremarkable.    Please send a [employer] message or call [phone]  if you have any questions.      Valencia Tolentino, CLARY, CNP  [place] [facility]    If you have further questions about the interpretation of your labs, [email or web address] is a good website to check out for further information.

## 2020-06-13 LAB
ALBUMIN SERPL-MCNC: 4 G/DL (ref 3.4–5)
ALP SERPL-CCNC: 76 U/L (ref 40–150)
ALT SERPL W P-5'-P-CCNC: 29 U/L (ref 0–70)
ANION GAP SERPL CALCULATED.3IONS-SCNC: 6 MMOL/L (ref 3–14)
AST SERPL W P-5'-P-CCNC: 19 U/L (ref 0–45)
BILIRUB SERPL-MCNC: 0.4 MG/DL (ref 0.2–1.3)
BUN SERPL-MCNC: 18 MG/DL (ref 7–30)
CALCIUM SERPL-MCNC: 9.7 MG/DL (ref 8.5–10.1)
CHLORIDE SERPL-SCNC: 103 MMOL/L (ref 94–109)
CHOLEST SERPL-MCNC: 148 MG/DL
CO2 SERPL-SCNC: 27 MMOL/L (ref 20–32)
CREAT SERPL-MCNC: 1.07 MG/DL (ref 0.66–1.25)
CREAT UR-MCNC: 68 MG/DL
GFR SERPL CREATININE-BSD FRML MDRD: 68 ML/MIN/{1.73_M2}
GLUCOSE SERPL-MCNC: 189 MG/DL (ref 70–99)
HDLC SERPL-MCNC: 39 MG/DL
LDLC SERPL CALC-MCNC: 76 MG/DL
MICROALBUMIN UR-MCNC: 41 MG/L
MICROALBUMIN/CREAT UR: 60.89 MG/G CR (ref 0–17)
NONHDLC SERPL-MCNC: 109 MG/DL
POTASSIUM SERPL-SCNC: 3.8 MMOL/L (ref 3.4–5.3)
PROT SERPL-MCNC: 8 G/DL (ref 6.8–8.8)
SODIUM SERPL-SCNC: 136 MMOL/L (ref 133–144)
TRIGL SERPL-MCNC: 163 MG/DL
TSH SERPL DL<=0.005 MIU/L-ACNC: 1.36 MU/L (ref 0.4–4)

## 2020-06-14 NOTE — RESULT ENCOUNTER NOTE
Mr. De La Vega,    -Normal red blood cell (hgb) levels, normal white blood cell count and normal platelet levels.  -Cholesterol levels are at your goal levels.  ADVISE: continuing your medication, a regular exercise program with at least 150 minutes of aerobic exercise per week, and eating a low saturated fat/low carbohydrate diet.  Also, you should recheck this fasting cholesterol panel in 12 months.  -Liver and gallbladder tests (ALT,AST, Alk phos,bilirubin) are normal.  -Kidney function (GFR) is normal.  -Sodium is normal.  -Potassium is normal.  -Calcium is normal.  -Glucose is elevated due to your diabetes.  -A1C test (average blood sugar the last 2-3 months) is above your goal.   ADVISE: making a diabetic followup appointment in 4 weeks as you've already done. Please check and record your blood sugars at least 2 times daily for 1 week prior to your appointment and bring for review.  Also, you should recheck your A1C in 3 months.  -TSH (thyroid stimulating hormone) level is normal which indicates normal thyroid function.  -Microalbumin (urine protein) level is elevated. This is suggestive of early damage to your kidneys from high blood pressure and diabetes.  ADVISE: avoiding anti-inflamatory agents such as ibuprofen (Advil, Motrin) or naproxen (Aleve) as much as possible, keeping your blood pressure in a normal range, and continuing your medication (losartan) that helps protect your kidneys.  Also, this should be rechecked in 1 year.     If you have further questions about the interpretation of your labs, [email or web address] is a good website to check out for further information.    Please contact the clinic if you have additional questions.  Thank you.    Sincerely,    Tj Verma MD

## 2020-06-15 ENCOUNTER — TRANSFERRED RECORDS (OUTPATIENT)
Dept: HEALTH INFORMATION MANAGEMENT | Facility: CLINIC | Age: 75
End: 2020-06-15

## 2020-06-15 ENCOUNTER — MYC MEDICAL ADVICE (OUTPATIENT)
Dept: FAMILY MEDICINE | Facility: CLINIC | Age: 75
End: 2020-06-15

## 2020-06-15 LAB — RETINOPATHY: NORMAL

## 2020-06-15 NOTE — TELEPHONE ENCOUNTER
We do not have a beta blocker on his medication list. What is he taking?    We typically do advise stopping beta blockers 24 hours prior to stress tests.    Sonja Arango PA-C

## 2020-06-15 NOTE — TELEPHONE ENCOUNTER
Please see [employer] message. Are you able to review and advise in JM, CNP's absence? Thank you.  LINDA Ochoa, RN  [facility]

## 2020-06-18 ENCOUNTER — HOSPITAL ENCOUNTER (OUTPATIENT)
Dept: CARDIOLOGY | Facility: CLINIC | Age: 75
Discharge: HOME | End: 2020-06-18
Attending: NURSE PRACTITIONER | Admitting: NURSE PRACTITIONER
Payer: COMMERCIAL

## 2020-06-18 ENCOUNTER — HOSPITAL ENCOUNTER (OUTPATIENT)
Dept: NUCLEAR MEDICINE | Facility: CLINIC | Age: 75
Setting detail: OBSERVATION
Discharge: HOME | End: 2020-06-18
Attending: NURSE PRACTITIONER | Admitting: NURSE PRACTITIONER
Payer: COMMERCIAL

## 2020-06-18 DIAGNOSIS — R07.9 CHEST PAIN, UNSPECIFIED TYPE: ICD-10-CM

## 2020-06-18 DIAGNOSIS — R06.02 SHORTNESS OF BREATH: ICD-10-CM

## 2020-06-18 LAB
CV STRESS MAX HR HE: 123
NUC STRESS EJECTION FRACTION: 69 %
RATE PRESSURE PRODUCT: NORMAL
STRESS ECHO BASELINE DIASTOLIC HE: 68
STRESS ECHO BASELINE HR: 97
STRESS ECHO BASELINE SYSTOLIC BP: 124
STRESS ECHO CALCULATED PERCENT HR: 84 %
STRESS ECHO LAST STRESS DIASTOLIC BP: 70
STRESS ECHO LAST STRESS SYSTOLIC BP: 130
STRESS ECHO TARGET HR: 147
STRESS/REST PERFUSION RATIO: 1.28

## 2020-06-18 PROCEDURE — A9502 TC99M TETROFOSMIN: HCPCS | Performed by: NURSE PRACTITIONER

## 2020-06-18 PROCEDURE — 93016 CV STRESS TEST SUPVJ ONLY: CPT | Performed by: INTERNAL MEDICINE

## 2020-06-18 PROCEDURE — 93017 CV STRESS TEST TRACING ONLY: CPT

## 2020-06-18 PROCEDURE — 78452 HT MUSCLE IMAGE SPECT MULT: CPT | Mod: 26 | Performed by: INTERNAL MEDICINE

## 2020-06-18 PROCEDURE — 78452 HT MUSCLE IMAGE SPECT MULT: CPT

## 2020-06-18 PROCEDURE — 25000128 H RX IP 250 OP 636

## 2020-06-18 PROCEDURE — 34300033 ZZH RX 343: Performed by: NURSE PRACTITIONER

## 2020-06-18 PROCEDURE — 93018 CV STRESS TEST I&R ONLY: CPT | Performed by: INTERNAL MEDICINE

## 2020-06-18 RX ORDER — REGADENOSON 0.08 MG/ML
INJECTION, SOLUTION INTRAVENOUS
Status: COMPLETED
Start: 2020-06-18 | End: 2020-06-18

## 2020-06-18 RX ADMIN — REGADENOSON 0.4 MG: 0.08 INJECTION, SOLUTION INTRAVENOUS at 11:52

## 2020-06-18 RX ADMIN — TETROFOSMIN 10 MCI.: 1.38 INJECTION, POWDER, LYOPHILIZED, FOR SOLUTION INTRAVENOUS at 10:16

## 2020-06-18 RX ADMIN — TETROFOSMIN 30.3 MCI.: 1.38 INJECTION, POWDER, LYOPHILIZED, FOR SOLUTION INTRAVENOUS at 11:54

## 2020-06-18 NOTE — RESULT ENCOUNTER NOTE
Mr. De La Vega,    -All of your labs are normal.  Your stress test does not appear markedly different than the one we did 8 years ago which is reassuring.    If you have further questions about the interpretation of your labs, [email or web address] is a good website to check out for further information.    Please contact the clinic if you have additional questions.  Thank you.    Sincerely,    Tj Verma MD

## 2020-06-22 ENCOUNTER — MYC REFILL (OUTPATIENT)
Dept: FAMILY MEDICINE | Facility: CLINIC | Age: 75
End: 2020-06-22

## 2020-06-22 DIAGNOSIS — E78.5 HYPERLIPIDEMIA LDL GOAL <70: ICD-10-CM

## 2020-06-22 DIAGNOSIS — I10 ESSENTIAL HYPERTENSION: ICD-10-CM

## 2020-06-22 RX ORDER — SIMVASTATIN 20 MG
20 TABLET ORAL AT BEDTIME
Qty: 30 TABLET | Refills: 1 | Status: CANCELLED | OUTPATIENT
Start: 2020-06-22

## 2020-06-23 RX ORDER — LOSARTAN POTASSIUM 100 MG/1
100 TABLET ORAL DAILY
Qty: 30 TABLET | Refills: 0 | Status: SHIPPED | OUTPATIENT
Start: 2020-06-23 | End: 2020-07-15

## 2020-06-23 NOTE — TELEPHONE ENCOUNTER
Medication is being filled for 1 time refill only due to:  further refills to be addressed at office visit in July   MALLY cOhoaN, RN  [facility]

## 2020-07-05 ENCOUNTER — MYC MEDICAL ADVICE (OUTPATIENT)
Dept: FAMILY MEDICINE | Facility: CLINIC | Age: 75
End: 2020-07-05

## 2020-07-09 ENCOUNTER — DOCUMENTATION ONLY (OUTPATIENT)
Dept: FAMILY MEDICINE | Facility: CLINIC | Age: 75
End: 2020-07-09

## 2020-07-09 DIAGNOSIS — E11.9 TYPE 2 DIABETES MELLITUS WITHOUT COMPLICATION, WITHOUT LONG-TERM CURRENT USE OF INSULIN (H): Primary | ICD-10-CM

## 2020-07-09 NOTE — PROGRESS NOTES
Heleroy Zaragoza will be in tomorrow for a lab appointment.    Can you place any Future Orders you would like us to do.    Sorry for the short notice but I thought they had sent you a message with the way the appointment note read.    Sorry & Thanks!  Fadumo Saucedo MLT ([facility])

## 2020-07-10 DIAGNOSIS — E11.9 TYPE 2 DIABETES MELLITUS WITHOUT COMPLICATION, WITHOUT LONG-TERM CURRENT USE OF INSULIN (H): ICD-10-CM

## 2020-07-10 LAB — HBA1C MFR BLD: 8.2 % (ref 0–5.6)

## 2020-07-10 PROCEDURE — 36415 COLL VENOUS BLD VENIPUNCTURE: CPT | Performed by: FAMILY MEDICINE

## 2020-07-10 PROCEDURE — 83036 HEMOGLOBIN GLYCOSYLATED A1C: CPT | Performed by: FAMILY MEDICINE

## 2020-07-10 NOTE — RESULT ENCOUNTER NOTE
Mr. De La Vega,    -A1C test (average blood sugar the last 2-3 months) is above your goal.   ADVISE: We'll chat about this at your appointment next week, Nik.  We'll figure something out to help this.  If you have further questions about the interpretation of your labs, [email or web address] is a good website to check out for further information.    Please contact the clinic if you have additional questions.  Thank you.    Sincerely,    Tj Verma MD

## 2020-07-14 NOTE — PROGRESS NOTES
"Jaylen De La Vega is a 74 year old male who is being evaluated via a billable video visit.      The patient has been notified of following:     \"This video visit will be conducted via a call between you and your physician/provider. We have found that certain health care needs can be provided without the need for an in-person physical exam.  This service lets us provide the care you need with a video conversation.  If a prescription is necessary we can send it directly to your pharmacy.  If lab work is needed we can place an order for that and you can then stop by our lab to have the test done at a later time.    Video visits are billed at different rates depending on your insurance coverage.  Please reach out to your insurance provider with any questions.    If during the course of the call the physician/provider feels a video visit is not appropriate, you will not be charged for this service.\"    Patient has given verbal consent for Video visit? Yes  How would you like to obtain your AVS? Jannahart  Patient would like the video invitation sent by: duncan  Will anyone else be joining your video visit? No      Subjective     Jaylen De La Vega is a 74 year old male who presents today via video visit for the following health issues:    HPI  Diabetes Follow-up    How often are you checking your blood sugar? Two times daily  Blood sugar testing frequency justification:  Uncontrolled diabetes  What time of day are you checking your blood sugars (select all that apply)?  Before meals  Have you had any blood sugars above 200?  No  Have you had any blood sugars below 70?  No- some in past as low as 55    What symptoms do you notice when your blood sugar is low?  Dizzy    What concerns do you have today about your diabetes? Low blood sugar      Do you have any of these symptoms? (Select all that apply)  No numbness or tingling in feet.  No redness, sores or blisters on feet.  No complaints of excessive thirst.  No reports of blurry " vision.  No significant changes to weight.    Have you had a diabetic eye exam in the last 12 months? Yes- Date of last eye exam: 6/15/20,  Location: [facility]      Sugars typically 150 or higher in the morning.  Rarely checks sugars before bed.  At 5pm they're .        Hyperlipidemia Follow-Up      Are you regularly taking any medication or supplement to lower your cholesterol?   Yes- Statin    Are you having muscle aches or other side effects that you think could be caused by your cholesterol lowering medication?  No    Hypertension Follow-up      Do you check your blood pressure regularly outside of the clinic? Yes     Are you following a low salt diet? No    Are your blood pressures ever more than 140 on the top number (systolic) OR more   than 90 on the bottom number (diastolic), for example 140/90? No      How many servings of fruits and vegetables do you eat daily?  4 or more    On average, how many sweetened beverages do you drink each day (Examples: soda, juice, sweet tea, etc.  Do NOT count diet or artificially sweetened beverages)?   1 - 2 wine at dinner    How many days per week do you exercise enough to make your heart beat faster? 7    How many minutes a day do you exercise enough to make your heart beat faster? 30 - 60     How many days per week do you miss taking your medication? 0          BP Readings from Last 2 Encounters:   06/12/20 (!) 142/88   01/29/20 135/78     Hemoglobin A1C (%)   Date Value   07/10/2020 8.2 (H)   06/12/2020 8.2 (H)     LDL Cholesterol Calculated (mg/dL)   Date Value   06/12/2020 76   06/26/2019 83                 Video Start Time: 3:35pm    Reports his low back pain continues to be an issue and that at his last epidural steroid injection with Dr. Olvera she advised him that it's time to see a spine surgeon.  Last epidural steroid injection gave relief for just 8 days and the one prior to that lasted only 3 weeks.  Last MRI of his lumbar spine was about a year  ago.            Reviewed and updated as needed this visit by Provider         Review of Systems   Constitutional, HEENT, cardiovascular, pulmonary, gi and gu systems are negative, except as otherwise noted.      Objective             Physical Exam     GENERAL: Healthy, alert and no distress  EYES: Eyes grossly normal to inspection.  No discharge or erythema, or obvious scleral/conjunctival abnormalities.  RESP: No audible wheeze, cough, or visible cyanosis.  No visible retractions or increased work of breathing.    SKIN: Visible skin clear. No significant rash, abnormal pigmentation or lesions.  NEURO: Cranial nerves grossly intact.  Mentation and speech appropriate for age.  PSYCH: Mentation appears normal, affect normal/bright, judgement and insight intact, normal speech and appearance well-groomed.      Diagnostic Test Results:  Labs reviewed in Epic        Assessment & Plan     1. Degeneration of lumbar or lumbosacral intervertebral disc  No longer managable with epidural steroid injections.  Will refer to spine surgery for consultation.  - Orthopedic & Spine  Referral; Future    2. Lumbar disc herniation  As above.  - Orthopedic & Spine  Referral; Future    3. Type 2 diabetes mellitus without complication, without long-term current use of insulin (H)  Still not optimally controlled.  Exercises most days per week (more comfortably now knowing that his recent GXT returned normal).  Admits he has one glass of wine every night and is concerned that is the culprit of his elevated sugars.  He really doesn't want to give up his wine he tells me.  I advised him we could proceed with a continuous glucose monitor to better understand when his sugars are high during the day and he agrees with this approach.  Will await results.  Declines nutrition counseling while he's at diabetes education for his CGM, advising me he's got all the written materials at home to review.   - AMBULATORY ADULT DIABETES  "EDUCATOR REFERRAL; Future  - glimepiride (AMARYL) 4 MG tablet; Take 1 tablet (4 mg) by mouth every morning (before breakfast)  Dispense: 90 tablet; Refill: 1  - metFORMIN (GLUCOPHAGE-XR) 500 MG 24 hr tablet; Take 1 tablet (500 mg) by mouth 2 times daily (with meals)  Dispense: 180 tablet; Refill: 1  - sitagliptin (JANUVIA) 25 MG tablet; Take 1 tablet (25 mg) by mouth daily  Dispense: 90 tablet; Refill: 1  - **A1C FUTURE 3mo; Future    4. Essential hypertension  BP elevated at last visit.  Will recheck BP when he's seen for his next lab draw.  - hydrochlorothiazide (HYDRODIURIL) 25 MG tablet; Take 1 tablet (25 mg) by mouth daily  Dispense: 90 tablet; Refill: 1  - losartan (COZAAR) 100 MG tablet; Take 1 tablet (100 mg) by mouth daily  Dispense: 90 tablet; Refill: 1    5. Hyperlipidemia LDL goal <70  Lipids at goal.    - simvastatin (ZOCOR) 20 MG tablet; Take 1 tablet (20 mg) by mouth At Bedtime at bedtime.  Dispense: 90 tablet; Refill: 3     BMI:   Estimated body mass index is 26.36 kg/m  as calculated from the following:    Height as of 6/12/20: 1.803 m (5' 11\").    Weight as of 6/12/20: 85.7 kg (189 lb).       Blood sugar testing frequency justification:  Uncontrolled diabetes and Adjustment of medication(s)      CONSULTATION/REFERRAL to Diabetes education for continuous glucose monitor     Return in about 3 months (around 10/15/2020) for Diabetes Recheck, Video visit with pre-visit vitals/labs.    Tj Verma Jr, MD  [facility]      Video-Visit Details    Type of service:  Video Visit    Video End Time:4:02 PM    Originating Location (pt. Location): Home    Distant Location (provider location):  [facility]     Platform used for Video Visit: AmWell    Return in about 3 months (around 10/15/2020) for Diabetes Recheck, Video visit with pre-visit vitals/labs.       Tj Verma Jr, MD        "

## 2020-07-15 ENCOUNTER — VIRTUAL VISIT (OUTPATIENT)
Dept: FAMILY MEDICINE | Facility: CLINIC | Age: 75
End: 2020-07-15
Payer: COMMERCIAL

## 2020-07-15 DIAGNOSIS — I10 ESSENTIAL HYPERTENSION: ICD-10-CM

## 2020-07-15 DIAGNOSIS — E78.5 HYPERLIPIDEMIA LDL GOAL <70: ICD-10-CM

## 2020-07-15 DIAGNOSIS — M51.379 DEGENERATION OF LUMBAR OR LUMBOSACRAL INTERVERTEBRAL DISC: Primary | ICD-10-CM

## 2020-07-15 DIAGNOSIS — M51.26 LUMBAR DISC HERNIATION: ICD-10-CM

## 2020-07-15 DIAGNOSIS — E11.9 TYPE 2 DIABETES MELLITUS WITHOUT COMPLICATION, WITHOUT LONG-TERM CURRENT USE OF INSULIN (H): ICD-10-CM

## 2020-07-15 PROCEDURE — 99214 OFFICE O/P EST MOD 30 MIN: CPT | Mod: 95 | Performed by: FAMILY MEDICINE

## 2020-07-15 RX ORDER — LOSARTAN POTASSIUM 100 MG/1
100 TABLET ORAL DAILY
Qty: 90 TABLET | Refills: 1 | Status: SHIPPED | OUTPATIENT
Start: 2020-07-15 | End: 2021-02-12

## 2020-07-15 RX ORDER — METFORMIN HCL 500 MG
500 TABLET, EXTENDED RELEASE 24 HR ORAL 2 TIMES DAILY WITH MEALS
Qty: 180 TABLET | Refills: 1 | Status: SHIPPED | OUTPATIENT
Start: 2020-07-15 | End: 2021-03-12

## 2020-07-15 RX ORDER — SIMVASTATIN 20 MG
20 TABLET ORAL AT BEDTIME
Qty: 90 TABLET | Refills: 3 | Status: SHIPPED | OUTPATIENT
Start: 2020-07-15 | End: 2021-05-26

## 2020-07-15 RX ORDER — GLIMEPIRIDE 4 MG/1
4 TABLET ORAL
Qty: 90 TABLET | Refills: 1 | Status: SHIPPED | OUTPATIENT
Start: 2020-07-15 | End: 2021-03-12 | Stop reason: ALTCHOICE

## 2020-07-15 RX ORDER — HYDROCHLOROTHIAZIDE 25 MG/1
25 TABLET ORAL DAILY
Qty: 90 TABLET | Refills: 1 | Status: SHIPPED | OUTPATIENT
Start: 2020-07-15 | End: 2021-03-12

## 2020-07-20 ENCOUNTER — OFFICE VISIT (OUTPATIENT)
Dept: NEUROSURGERY | Facility: CLINIC | Age: 75
End: 2020-07-20
Attending: FAMILY MEDICINE
Payer: COMMERCIAL

## 2020-07-20 VITALS
HEIGHT: 71 IN | SYSTOLIC BLOOD PRESSURE: 154 MMHG | RESPIRATION RATE: 18 BRPM | DIASTOLIC BLOOD PRESSURE: 82 MMHG | OXYGEN SATURATION: 93 % | BODY MASS INDEX: 26.46 KG/M2 | HEART RATE: 103 BPM | WEIGHT: 189 LBS

## 2020-07-20 DIAGNOSIS — M54.16 SPINAL STENOSIS OF LUMBAR REGION WITH RADICULOPATHY: ICD-10-CM

## 2020-07-20 DIAGNOSIS — M48.061 SPINAL STENOSIS OF LUMBAR REGION WITH RADICULOPATHY: ICD-10-CM

## 2020-07-20 DIAGNOSIS — Z11.59 ENCOUNTER FOR SCREENING FOR OTHER VIRAL DISEASES: Primary | ICD-10-CM

## 2020-07-20 DIAGNOSIS — M51.26 LUMBAR DISC HERNIATION: Primary | ICD-10-CM

## 2020-07-20 DIAGNOSIS — M51.379 DEGENERATION OF LUMBAR OR LUMBOSACRAL INTERVERTEBRAL DISC: ICD-10-CM

## 2020-07-20 PROCEDURE — G0463 HOSPITAL OUTPT CLINIC VISIT: HCPCS

## 2020-07-20 PROCEDURE — 99204 OFFICE O/P NEW MOD 45 MIN: CPT | Performed by: NURSE PRACTITIONER

## 2020-07-20 ASSESSMENT — PAIN SCALES - GENERAL: PAINLEVEL: SEVERE PAIN (6)

## 2020-07-20 ASSESSMENT — MIFFLIN-ST. JEOR: SCORE: 1624.43
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
## 2020-07-20 NOTE — NURSING NOTE
Patient Education    Education included but not limited to:  - Surgical risks: blood clots, urinating difficulties, nerve damage, infection.  - Pre-operative physical with primary care physician within 30 days of surgical date.   - Pre-operative clearance from other pertaining specialties.   - Discontinue NSAIDS x 7 days prior to surgical date.   -Do not begin taking NSAIDs (Advil, Motrin, Ibuprofen, Nuprin, Diclofenac, Meloxicam, Aleve, Celebrex, Aspirin, etc.) until 6 weeks after surgery if you had a fusion. May cause bleeding and interfere with bone healing.    -May try Tylenol for pain.    -Discussed being off work after surgery, short term disability, FMLA, etc.   -Forms to be completed    -Pre-op timeline: NPO, shower, medications    -Hospital stay: Checking in, surgery, recovery room, hospital room.    - Post operative pain management: narcotics, muscle relaxants, ice, etc.   -No driving while taking narcotics     -Post operative incision care:   Keep your incision clean and dry.   Okay to shower. No submerging in water until incision healed.   Watch for signs of infection and notify clinic if drainage or fever develops.   - Post operative activity limitations recommended until follow up appointment: no lifting > 10 pounds; limited bending, twisting, overhead reaching.    - Follow up appointments: 2 week nurse visit, discussed doing via phone if feeling ok. 6 week post op, 3 months post op, 6 months post op, 1 year post op. You will need to an xray before each appointment. Please call to schedule follow up appointment at [phone].        Patient verbalized understanding of above instructions. All questions were answered to the best of my ability and the patient's satisfaction. Patient advised to call with any additional questions or concerns.

## 2020-07-20 NOTE — PATIENT INSTRUCTIONS
- Order placed for MRI. You can schedule at our  today, or you can call [facility] at [phone]. We will call you with the results and next steps once imaging is completed.    Patient Instructions    Surgery scheduled at [facility] for a MIS decompression with Dr. Solis     Pre-Operative    Surgical risks: blood clots in the leg or lung, problems urinating, nerve damage, drainage from the incision, infection,stiffness    Pre-operative physical with primary care physician within 30 days of surgical date.     Likely same day procedure with discharge home day of surgery, may stay for 23 hour observation hospitalization for monitoring.       Shower procedure  o Please shower with antimicrobial soap the night before surgery and morning of surgery. Please refer to showering instruction sheet in folder.    Eating/Drinking  o Stop all solid foods 8 hours before surgery.      Medications  o Hold Aspirin, NSAIDs (Advil/Ibuprofen, Indocin, Naproxen,Nuprin,Relafen/Nabumetone, Diclofenac,Meloxicam, Aleve, Celebrex) x 7 days prior to surgical date  o You can take Tylenol (Acetaminophen) for pain, 1000 mg  - Do not exceed 3,000 mg per day   o Any other medications prescribed, please discuss with your primary care provider at your pre-operative physical     Pain Management    You will have some post-operative incisional pain which may require pain medications and muscle relaxants. You will receive medication upon discharge.    You may resume taking NSAIDs (ex. Ibuprofen, aleve, naproxen) immediately post-op    Do NOT drive while taking narcotic pain medication    Do NOT drink alcohol while using any pain medication    You can utilize ice as needed (no longer than 20 minutes at one time)    Incision Care    No submerging incision in water such as pools, hot tubs, baths for at least 8 weeks or until incision is healed    It is okay to shower, just pat the incision dry     Remove dressing as instructed upon  discharge    Watch for signs of infection  o Redness, swelling, warmth, drainage, and fever of 101 degrees or higher  o Notify clinic [phone].    Activity Restrictions    For the first 6-8 weeks, no lifting > 10 pounds, limited bending, twisting, or overhead reaching.    Take stairs in moderation     Ok to walk as tolerated, take short frequent walks. You may gradually increase the distance as tolerated.     Avoid bed rest and prolonged sitting for longer than 30 minutes (change positions frequently while awake)    No contact sports until after follow up visit    No high impact activities such as; running/jogging, snowmobile or 4 hays riding or any other recreational vehicles    Please call the clinic if you develop any of the following symptoms:  o Swelling and/or warmth in one or both legs  o Pain or tenderness in your leg, ankle, foot, or arm   o Red or discolored skin     Post-Op Follow Up Appointments    2 week incision check with RN    6 week post op follow up visit with Physician Assistant    3 months post op with Dr. Solis     Please call to schedule follow up appointment at [phone]    Resources    If you are currently employed, you will need to be off work for 2-4 weeks for post op recovery and healing.    Please fax any FMLA/short term disability paperwork to [phone]    You may call our clinic when you'd like to return to work and we can provide a work letter    To allow staff adequate time to complete paperwork, please send as soon as possible

## 2020-07-20 NOTE — NURSING NOTE
"Jaylen De La Vega is a 74 year old male who presents for:  Chief Complaint   Patient presents with     Consult For     Lumbar issues        Initial Vitals:  BP (!) 154/82   Pulse 103   Resp 18   Ht 5' 11\" (1.803 m)   Wt 189 lb (85.7 kg)   SpO2 93%   BMI 26.36 kg/m   Estimated body mass index is 26.36 kg/m  as calculated from the following:    Height as of this encounter: 5' 11\" (1.803 m).    Weight as of this encounter: 189 lb (85.7 kg).. Body surface area is 2.07 meters squared. BP completed using cuff size: regular  Severe Pain (6)    Nursing Comments: Pt present today for back issues.    Eamon Soto, VINAY    "

## 2020-07-24 ENCOUNTER — HOSPITAL ENCOUNTER (OUTPATIENT)
Dept: MRI IMAGING | Facility: CLINIC | Age: 75
Discharge: HOME | End: 2020-07-24
Attending: NEUROLOGICAL SURGERY | Admitting: NEUROLOGICAL SURGERY
Payer: COMMERCIAL

## 2020-07-24 DIAGNOSIS — M54.16 SPINAL STENOSIS OF LUMBAR REGION WITH RADICULOPATHY: ICD-10-CM

## 2020-07-24 DIAGNOSIS — M48.061 SPINAL STENOSIS OF LUMBAR REGION WITH RADICULOPATHY: ICD-10-CM

## 2020-07-24 PROCEDURE — 72148 MRI LUMBAR SPINE W/O DYE: CPT

## 2020-08-14 ENCOUNTER — OFFICE VISIT (OUTPATIENT)
Dept: FAMILY MEDICINE | Facility: CLINIC | Age: 75
End: 2020-08-14
Payer: COMMERCIAL

## 2020-08-14 VITALS
WEIGHT: 186 LBS | OXYGEN SATURATION: 96 % | BODY MASS INDEX: 26.04 KG/M2 | HEART RATE: 93 BPM | SYSTOLIC BLOOD PRESSURE: 138 MMHG | DIASTOLIC BLOOD PRESSURE: 84 MMHG | TEMPERATURE: 97.4 F | HEIGHT: 71 IN

## 2020-08-14 DIAGNOSIS — E11.9 TYPE 2 DIABETES MELLITUS WITHOUT COMPLICATION, WITHOUT LONG-TERM CURRENT USE OF INSULIN (H): Primary | ICD-10-CM

## 2020-08-14 DIAGNOSIS — J44.9 CHRONIC OBSTRUCTIVE PULMONARY DISEASE, UNSPECIFIED COPD TYPE (H): ICD-10-CM

## 2020-08-14 DIAGNOSIS — M51.26 LUMBAR DISC HERNIATION: ICD-10-CM

## 2020-08-14 PROCEDURE — 99214 OFFICE O/P EST MOD 30 MIN: CPT | Performed by: FAMILY MEDICINE

## 2020-08-14 ASSESSMENT — MIFFLIN-ST. JEOR: SCORE: 1610.82
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
## 2020-08-17 DIAGNOSIS — Z11.59 ENCOUNTER FOR SCREENING FOR OTHER VIRAL DISEASES: ICD-10-CM

## 2020-08-17 PROCEDURE — U0003 INFECTIOUS AGENT DETECTION BY NUCLEIC ACID (DNA OR RNA); SEVERE ACUTE RESPIRATORY SYNDROME CORONAVIRUS 2 (SARS-COV-2) (CORONAVIRUS DISEASE [COVID-19]), AMPLIFIED PROBE TECHNIQUE, MAKING USE OF HIGH THROUGHPUT TECHNOLOGIES AS DESCRIBED BY CMS-2020-01-R: HCPCS | Performed by: NEUROLOGICAL SURGERY

## 2020-08-18 LAB
SARS-COV-2 RNA SPEC QL NAA+PROBE: NOT DETECTED
SPECIMEN SOURCE: NORMAL

## 2020-08-19 ENCOUNTER — ANESTHESIA EVENT (OUTPATIENT)
Dept: SURGERY | Facility: CLINIC | Age: 75
End: 2020-08-19
Payer: COMMERCIAL

## 2020-08-19 ENCOUNTER — ANESTHESIA (OUTPATIENT)
Dept: SURGERY | Facility: CLINIC | Age: 75
End: 2020-08-19
Payer: COMMERCIAL

## 2020-08-19 ENCOUNTER — HOSPITAL ENCOUNTER (OUTPATIENT)
Facility: CLINIC | Age: 75
Discharge: HOME | End: 2020-08-19
Attending: NEUROLOGICAL SURGERY | Admitting: NEUROLOGICAL SURGERY
Payer: COMMERCIAL

## 2020-08-19 ENCOUNTER — APPOINTMENT (OUTPATIENT)
Dept: GENERAL RADIOLOGY | Facility: CLINIC | Age: 75
End: 2020-08-19
Attending: NEUROLOGICAL SURGERY
Payer: COMMERCIAL

## 2020-08-19 VITALS
SYSTOLIC BLOOD PRESSURE: 166 MMHG | HEART RATE: 92 BPM | TEMPERATURE: 98.5 F | WEIGHT: 186.7 LBS | OXYGEN SATURATION: 94 % | BODY MASS INDEX: 26.14 KG/M2 | HEIGHT: 71 IN | RESPIRATION RATE: 16 BRPM | DIASTOLIC BLOOD PRESSURE: 94 MMHG

## 2020-08-19 DIAGNOSIS — M54.16 SPINAL STENOSIS OF LUMBAR REGION WITH RADICULOPATHY: Primary | ICD-10-CM

## 2020-08-19 DIAGNOSIS — M48.061 SPINAL STENOSIS OF LUMBAR REGION WITH RADICULOPATHY: Primary | ICD-10-CM

## 2020-08-19 DIAGNOSIS — M54.16 SPINAL STENOSIS OF LUMBAR REGION WITH RADICULOPATHY: ICD-10-CM

## 2020-08-19 DIAGNOSIS — M48.061 SPINAL STENOSIS OF LUMBAR REGION WITH RADICULOPATHY: ICD-10-CM

## 2020-08-19 LAB
CREAT SERPL-MCNC: 1.06 MG/DL (ref 0.66–1.25)
GFR SERPL CREATININE-BSD FRML MDRD: 69 ML/MIN/{1.73_M2}
GLUCOSE BLDC GLUCOMTR-MCNC: 114 MG/DL (ref 70–99)
GLUCOSE BLDC GLUCOMTR-MCNC: 299 MG/DL (ref 70–99)
POTASSIUM SERPL-SCNC: 3.7 MMOL/L (ref 3.4–5.3)

## 2020-08-19 PROCEDURE — 36415 COLL VENOUS BLD VENIPUNCTURE: CPT | Performed by: ANESTHESIOLOGY

## 2020-08-19 PROCEDURE — 71000027 ZZH RECOVERY PHASE 2 EACH 15 MINS: Performed by: NEUROLOGICAL SURGERY

## 2020-08-19 PROCEDURE — 71000012 ZZH RECOVERY PHASE 1 LEVEL 1 FIRST HR: Performed by: NEUROLOGICAL SURGERY

## 2020-08-19 PROCEDURE — 25800030 ZZH RX IP 258 OP 636: Performed by: ANESTHESIOLOGY

## 2020-08-19 PROCEDURE — 25000125 ZZHC RX 250: Performed by: NEUROLOGICAL SURGERY

## 2020-08-19 PROCEDURE — 36000063 ZZH SURGERY LEVEL 4 EA 15 ADDTL MIN: Performed by: NEUROLOGICAL SURGERY

## 2020-08-19 PROCEDURE — 82962 GLUCOSE BLOOD TEST: CPT

## 2020-08-19 PROCEDURE — 25000125 ZZHC RX 250: Performed by: NURSE ANESTHETIST, CERTIFIED REGISTERED

## 2020-08-19 PROCEDURE — 63048 LAM FACETEC &FORAMOT EA ADDL: CPT | Performed by: NEUROLOGICAL SURGERY

## 2020-08-19 PROCEDURE — 36000065 ZZH SURGERY LEVEL 4 W FLUORO 1ST 30 MIN: Performed by: NEUROLOGICAL SURGERY

## 2020-08-19 PROCEDURE — 40000277 XR SURGERY CARM FLUORO LESS THAN 5 MIN W STILLS: Mod: TC

## 2020-08-19 PROCEDURE — 63047 LAM FACETEC & FORAMOT LUMBAR: CPT | Performed by: NEUROLOGICAL SURGERY

## 2020-08-19 PROCEDURE — 84132 ASSAY OF SERUM POTASSIUM: CPT | Performed by: ANESTHESIOLOGY

## 2020-08-19 PROCEDURE — 82565 ASSAY OF CREATININE: CPT | Performed by: ANESTHESIOLOGY

## 2020-08-19 PROCEDURE — 25000128 H RX IP 250 OP 636: Performed by: NURSE ANESTHETIST, CERTIFIED REGISTERED

## 2020-08-19 PROCEDURE — 25800030 ZZH RX IP 258 OP 636: Performed by: NURSE ANESTHETIST, CERTIFIED REGISTERED

## 2020-08-19 PROCEDURE — 25000132 ZZH RX MED GY IP 250 OP 250 PS 637: Performed by: NEUROLOGICAL SURGERY

## 2020-08-19 PROCEDURE — 27210794 ZZH OR GENERAL SUPPLY STERILE: Performed by: NEUROLOGICAL SURGERY

## 2020-08-19 PROCEDURE — 25000128 H RX IP 250 OP 636: Performed by: NEUROLOGICAL SURGERY

## 2020-08-19 PROCEDURE — 63048 LAM FACETEC &FORAMOT EA ADDL: CPT | Mod: AS | Performed by: PHYSICIAN ASSISTANT

## 2020-08-19 PROCEDURE — 37000008 ZZH ANESTHESIA TECHNICAL FEE, 1ST 30 MIN: Performed by: NEUROLOGICAL SURGERY

## 2020-08-19 PROCEDURE — 40000306 ZZH STATISTIC PRE PROC ASSESS II: Performed by: NEUROLOGICAL SURGERY

## 2020-08-19 PROCEDURE — 37000009 ZZH ANESTHESIA TECHNICAL FEE, EACH ADDTL 15 MIN: Performed by: NEUROLOGICAL SURGERY

## 2020-08-19 PROCEDURE — 63047 LAM FACETEC & FORAMOT LUMBAR: CPT | Mod: AS | Performed by: PHYSICIAN ASSISTANT

## 2020-08-19 PROCEDURE — 25000132 ZZH RX MED GY IP 250 OP 250 PS 637: Performed by: PHYSICIAN ASSISTANT

## 2020-08-19 PROCEDURE — 27210995 ZZH RX 272: Performed by: NEUROLOGICAL SURGERY

## 2020-08-19 RX ORDER — DIAZEPAM 10 MG/2ML
2.5 INJECTION, SOLUTION INTRAMUSCULAR; INTRAVENOUS
Status: DISCONTINUED | OUTPATIENT
Start: 2020-08-19 | End: 2020-08-19 | Stop reason: HOSPADM

## 2020-08-19 RX ORDER — SODIUM CHLORIDE, SODIUM LACTATE, POTASSIUM CHLORIDE, CALCIUM CHLORIDE 600; 310; 30; 20 MG/100ML; MG/100ML; MG/100ML; MG/100ML
INJECTION, SOLUTION INTRAVENOUS CONTINUOUS
Status: DISCONTINUED | OUTPATIENT
Start: 2020-08-19 | End: 2020-08-19 | Stop reason: HOSPADM

## 2020-08-19 RX ORDER — DEXAMETHASONE SODIUM PHOSPHATE 4 MG/ML
INJECTION, SOLUTION INTRA-ARTICULAR; INTRALESIONAL; INTRAMUSCULAR; INTRAVENOUS; SOFT TISSUE PRN
Status: DISCONTINUED | OUTPATIENT
Start: 2020-08-19 | End: 2020-08-19

## 2020-08-19 RX ORDER — KETOROLAC TROMETHAMINE 30 MG/ML
INJECTION, SOLUTION INTRAMUSCULAR; INTRAVENOUS PRN
Status: DISCONTINUED | OUTPATIENT
Start: 2020-08-19 | End: 2020-08-19

## 2020-08-19 RX ORDER — CEFAZOLIN SODIUM 2 G/100ML
2 INJECTION, SOLUTION INTRAVENOUS
Status: COMPLETED | OUTPATIENT
Start: 2020-08-19 | End: 2020-08-19

## 2020-08-19 RX ORDER — LIDOCAINE HYDROCHLORIDE 10 MG/ML
INJECTION, SOLUTION INFILTRATION; PERINEURAL PRN
Status: DISCONTINUED | OUTPATIENT
Start: 2020-08-19 | End: 2020-08-19

## 2020-08-19 RX ORDER — HYDROMORPHONE HYDROCHLORIDE 1 MG/ML
.3-.5 INJECTION, SOLUTION INTRAMUSCULAR; INTRAVENOUS; SUBCUTANEOUS EVERY 5 MIN PRN
Status: DISCONTINUED | OUTPATIENT
Start: 2020-08-19 | End: 2020-08-19 | Stop reason: HOSPADM

## 2020-08-19 RX ORDER — OXYCODONE HYDROCHLORIDE 5 MG/1
5 TABLET ORAL EVERY 6 HOURS PRN
Qty: 30 TABLET | Refills: 0 | Status: SHIPPED | OUTPATIENT
Start: 2020-08-19 | End: 2020-09-02

## 2020-08-19 RX ORDER — PROPOFOL 10 MG/ML
INJECTION, EMULSION INTRAVENOUS PRN
Status: DISCONTINUED | OUTPATIENT
Start: 2020-08-19 | End: 2020-08-19

## 2020-08-19 RX ORDER — NALOXONE HYDROCHLORIDE 0.4 MG/ML
.1-.4 INJECTION, SOLUTION INTRAMUSCULAR; INTRAVENOUS; SUBCUTANEOUS
Status: DISCONTINUED | OUTPATIENT
Start: 2020-08-19 | End: 2020-08-19 | Stop reason: HOSPADM

## 2020-08-19 RX ORDER — ACETAMINOPHEN 325 MG/1
650 TABLET ORAL
Status: DISCONTINUED | OUTPATIENT
Start: 2020-08-19 | End: 2020-08-19 | Stop reason: HOSPADM

## 2020-08-19 RX ORDER — ACETAMINOPHEN 325 MG/1
975 TABLET ORAL ONCE
Status: COMPLETED | OUTPATIENT
Start: 2020-08-19 | End: 2020-08-19

## 2020-08-19 RX ORDER — NEOSTIGMINE METHYLSULFATE 1 MG/ML
VIAL (ML) INJECTION PRN
Status: DISCONTINUED | OUTPATIENT
Start: 2020-08-19 | End: 2020-08-19

## 2020-08-19 RX ORDER — ALBUTEROL SULFATE 0.83 MG/ML
2.5 SOLUTION RESPIRATORY (INHALATION) EVERY 4 HOURS PRN
Status: DISCONTINUED | OUTPATIENT
Start: 2020-08-19 | End: 2020-08-19 | Stop reason: HOSPADM

## 2020-08-19 RX ORDER — LABETALOL 20 MG/4 ML (5 MG/ML) INTRAVENOUS SYRINGE
10
Status: DISCONTINUED | OUTPATIENT
Start: 2020-08-19 | End: 2020-08-19 | Stop reason: HOSPADM

## 2020-08-19 RX ORDER — IBUPROFEN 600 MG/1
600 TABLET, FILM COATED ORAL EVERY 8 HOURS PRN
Qty: 42 TABLET | Refills: 0 | Status: SHIPPED | OUTPATIENT
Start: 2020-08-19 | End: 2020-09-02

## 2020-08-19 RX ORDER — MAGNESIUM HYDROXIDE 1200 MG/15ML
LIQUID ORAL PRN
Status: DISCONTINUED | OUTPATIENT
Start: 2020-08-19 | End: 2020-08-19 | Stop reason: HOSPADM

## 2020-08-19 RX ORDER — LIDOCAINE 40 MG/G
CREAM TOPICAL
Status: DISCONTINUED | OUTPATIENT
Start: 2020-08-19 | End: 2020-08-19 | Stop reason: HOSPADM

## 2020-08-19 RX ORDER — BUPIVACAINE HYDROCHLORIDE AND EPINEPHRINE 2.5; 5 MG/ML; UG/ML
INJECTION, SOLUTION INFILTRATION; PERINEURAL PRN
Status: DISCONTINUED | OUTPATIENT
Start: 2020-08-19 | End: 2020-08-19 | Stop reason: HOSPADM

## 2020-08-19 RX ORDER — ONDANSETRON 2 MG/ML
INJECTION INTRAMUSCULAR; INTRAVENOUS PRN
Status: DISCONTINUED | OUTPATIENT
Start: 2020-08-19 | End: 2020-08-19

## 2020-08-19 RX ORDER — METHOCARBAMOL 750 MG/1
750 TABLET, FILM COATED ORAL
Status: DISCONTINUED | OUTPATIENT
Start: 2020-08-19 | End: 2020-08-19 | Stop reason: HOSPADM

## 2020-08-19 RX ORDER — ONDANSETRON 2 MG/ML
4 INJECTION INTRAMUSCULAR; INTRAVENOUS EVERY 30 MIN PRN
Status: DISCONTINUED | OUTPATIENT
Start: 2020-08-19 | End: 2020-08-19 | Stop reason: HOSPADM

## 2020-08-19 RX ORDER — CEFAZOLIN SODIUM 1 G/3ML
1 INJECTION, POWDER, FOR SOLUTION INTRAMUSCULAR; INTRAVENOUS SEE ADMIN INSTRUCTIONS
Status: DISCONTINUED | OUTPATIENT
Start: 2020-08-19 | End: 2020-08-19 | Stop reason: HOSPADM

## 2020-08-19 RX ORDER — OXYCODONE HYDROCHLORIDE 5 MG/1
5 TABLET ORAL
Status: COMPLETED | OUTPATIENT
Start: 2020-08-19 | End: 2020-08-19

## 2020-08-19 RX ORDER — GLYCOPYRROLATE 0.2 MG/ML
INJECTION, SOLUTION INTRAMUSCULAR; INTRAVENOUS PRN
Status: DISCONTINUED | OUTPATIENT
Start: 2020-08-19 | End: 2020-08-19

## 2020-08-19 RX ORDER — FENTANYL CITRATE 50 UG/ML
INJECTION, SOLUTION INTRAMUSCULAR; INTRAVENOUS PRN
Status: DISCONTINUED | OUTPATIENT
Start: 2020-08-19 | End: 2020-08-19

## 2020-08-19 RX ORDER — ONDANSETRON 4 MG/1
4 TABLET, ORALLY DISINTEGRATING ORAL EVERY 30 MIN PRN
Status: DISCONTINUED | OUTPATIENT
Start: 2020-08-19 | End: 2020-08-19 | Stop reason: HOSPADM

## 2020-08-19 RX ORDER — FENTANYL CITRATE 50 UG/ML
25-50 INJECTION, SOLUTION INTRAMUSCULAR; INTRAVENOUS
Status: DISCONTINUED | OUTPATIENT
Start: 2020-08-19 | End: 2020-08-19 | Stop reason: HOSPADM

## 2020-08-19 RX ORDER — DIMENHYDRINATE 50 MG/ML
25 INJECTION, SOLUTION INTRAMUSCULAR; INTRAVENOUS
Status: DISCONTINUED | OUTPATIENT
Start: 2020-08-19 | End: 2020-08-19 | Stop reason: HOSPADM

## 2020-08-19 RX ORDER — MEPERIDINE HYDROCHLORIDE 25 MG/ML
12.5 INJECTION INTRAMUSCULAR; INTRAVENOUS; SUBCUTANEOUS EVERY 5 MIN PRN
Status: DISCONTINUED | OUTPATIENT
Start: 2020-08-19 | End: 2020-08-19 | Stop reason: HOSPADM

## 2020-08-19 RX ORDER — METHOCARBAMOL 500 MG/1
500 TABLET, FILM COATED ORAL 4 TIMES DAILY PRN
Qty: 30 TABLET | Refills: 0 | Status: SHIPPED | OUTPATIENT
Start: 2020-08-19 | End: 2020-09-02

## 2020-08-19 RX ORDER — DOCUSATE SODIUM 100 MG/1
100 CAPSULE, LIQUID FILLED ORAL 2 TIMES DAILY PRN
Qty: 30 CAPSULE | Refills: 0 | Status: SHIPPED | OUTPATIENT
Start: 2020-08-19 | End: 2021-01-30

## 2020-08-19 RX ORDER — HYDRALAZINE HYDROCHLORIDE 20 MG/ML
2.5-5 INJECTION INTRAMUSCULAR; INTRAVENOUS EVERY 10 MIN PRN
Status: DISCONTINUED | OUTPATIENT
Start: 2020-08-19 | End: 2020-08-19 | Stop reason: HOSPADM

## 2020-08-19 RX ADMIN — DEXMEDETOMIDINE HYDROCHLORIDE 0.4 MCG/KG/HR: 100 INJECTION, SOLUTION INTRAVENOUS at 09:34

## 2020-08-19 RX ADMIN — ROCURONIUM BROMIDE 10 MG: 10 INJECTION INTRAVENOUS at 10:18

## 2020-08-19 RX ADMIN — PHENYLEPHRINE HYDROCHLORIDE 100 MCG: 10 INJECTION INTRAVENOUS at 10:36

## 2020-08-19 RX ADMIN — ROCURONIUM BROMIDE 50 MG: 10 INJECTION INTRAVENOUS at 09:34

## 2020-08-19 RX ADMIN — ACETAMINOPHEN 975 MG: 325 TABLET, FILM COATED ORAL at 09:07

## 2020-08-19 RX ADMIN — ROCURONIUM BROMIDE 10 MG: 10 INJECTION INTRAVENOUS at 10:34

## 2020-08-19 RX ADMIN — PHENYLEPHRINE HYDROCHLORIDE 100 MCG: 10 INJECTION INTRAVENOUS at 10:03

## 2020-08-19 RX ADMIN — LIDOCAINE HYDROCHLORIDE 50 MG: 10 INJECTION, SOLUTION INFILTRATION; PERINEURAL at 09:34

## 2020-08-19 RX ADMIN — GLYCOPYRROLATE 0.2 MG: 0.2 INJECTION, SOLUTION INTRAMUSCULAR; INTRAVENOUS at 09:34

## 2020-08-19 RX ADMIN — PHENYLEPHRINE HYDROCHLORIDE 100 MCG: 10 INJECTION INTRAVENOUS at 10:24

## 2020-08-19 RX ADMIN — FENTANYL CITRATE 100 MCG: 50 INJECTION, SOLUTION INTRAMUSCULAR; INTRAVENOUS at 09:34

## 2020-08-19 RX ADMIN — KETOROLAC TROMETHAMINE 30 MG: 30 INJECTION, SOLUTION INTRAMUSCULAR at 09:34

## 2020-08-19 RX ADMIN — DEXAMETHASONE SODIUM PHOSPHATE 8 MG: 4 INJECTION, SOLUTION INTRA-ARTICULAR; INTRALESIONAL; INTRAMUSCULAR; INTRAVENOUS; SOFT TISSUE at 09:34

## 2020-08-19 RX ADMIN — CEFAZOLIN SODIUM 2 G: 2 INJECTION, SOLUTION INTRAVENOUS at 09:39

## 2020-08-19 RX ADMIN — OXYCODONE HYDROCHLORIDE 5 MG: 5 TABLET ORAL at 12:23

## 2020-08-19 RX ADMIN — SODIUM CHLORIDE, POTASSIUM CHLORIDE, SODIUM LACTATE AND CALCIUM CHLORIDE: 600; 310; 30; 20 INJECTION, SOLUTION INTRAVENOUS at 09:27

## 2020-08-19 RX ADMIN — HYDROMORPHONE HYDROCHLORIDE 1 MG: 1 INJECTION, SOLUTION INTRAMUSCULAR; INTRAVENOUS; SUBCUTANEOUS at 09:34

## 2020-08-19 RX ADMIN — PROPOFOL 150 MG: 10 INJECTION, EMULSION INTRAVENOUS at 09:34

## 2020-08-19 RX ADMIN — GLYCOPYRROLATE 0.4 MG: 0.2 INJECTION, SOLUTION INTRAMUSCULAR; INTRAVENOUS at 10:45

## 2020-08-19 RX ADMIN — Medication 3 MG: at 10:45

## 2020-08-19 RX ADMIN — ONDANSETRON HYDROCHLORIDE 4 MG: 2 INJECTION, SOLUTION INTRAVENOUS at 09:34

## 2020-08-19 SDOH — HEALTH STABILITY: MENTAL HEALTH: CURRENT SMOKER: 0

## 2020-08-19 ASSESSMENT — COPD QUESTIONNAIRES
CAT_SEVERITY: MODERATE
COPD: 1

## 2020-08-19 ASSESSMENT — MIFFLIN-ST. JEOR: SCORE: 1614

## 2020-08-19 NOTE — PROVIDER NOTIFICATION
Patient unable to produce urine steam more than a dribble or small stream intermittently.  PVS several times, last being 474 around 1600.  PA was paged at 1500 with no call back after an hour.  Dr. Villanueva notified of PVS of 474.  Orders received: straight cath x 1.  Patient may discharge home following straight cath.  Instruct patient to go to emergency room if unable to void within 8-10 hours.    At 1620, patient was straight cathed using 16 Burmese coude catheter and bladder was drained of 425ml of urine.  Patient and family voiced understanding of need to void within 10 hours.

## 2020-08-19 NOTE — OR NURSING
Dr Villanueva aware  after pt had grahm crackers and apple juice.  No pre-snack BG was taken.  No action warranted by Dr. Villanueva at this time.

## 2020-08-19 NOTE — BRIEF OP NOTE
[facility]    Brief Operative Note    Pre-operative diagnosis: Spinal stenosis of lumbar region with radiculopathy [M48.061, M54.16]  Post-operative diagnosis Same as pre-operative diagnosis    Procedure: Procedure(s):  Left Lumbar 4-5 minimally invasive decompression and Left Lumbar 5-Sacral 1 microdiscectomy  Surgeon: Surgeon(s) and Role:     * Girma Solis MD - Primary     * Jonathan Gurrola PA-C - Assisting  Anesthesia: General   Estimated blood loss: 25 mL  Drains: None  Specimens: * No specimens in log *  Findings:   None.  Complications: None.  Implants: * No implants in log *    [id number]

## 2020-08-19 NOTE — ANESTHESIA POSTPROCEDURE EVALUATION
Patient: Jaylen De La Vega    Procedure(s):  Left Lumbar 4-5 minimally invasive decompression and Left Lumbar 5-Sacral 1 microdiscectomy    Diagnosis:Spinal stenosis of lumbar region with radiculopathy [M48.061, M54.16]  Diagnosis Additional Information: No value filed.    Anesthesia Type:  General    Note:  Anesthesia Post Evaluation    Patient location during evaluation: PACU  Patient participation: Able to fully participate in evaluation  Level of consciousness: sleepy but conscious  Pain management: adequate  multimodal analgesia used between 6 hours prior to anesthesia start to PACU dischargeAirway patency: patent  Cardiovascular status: acceptable  Respiratory status: acceptable  Hydration status: acceptable  PONV: none       Comments: Stable pulmonary status should allow for discharge.        Last vitals:  Vitals:    08/19/20 0836 08/19/20 1100 08/19/20 1105   BP: (!) 144/96 (!) 149/66    Pulse: 103 80 84   Resp: 16 15 (!) 5   Temp: 97.5  F (36.4  C) 97  F (36.1  C)    SpO2: 95% 99% 99%         Electronically Signed By: Alex Villanueva MD  August 19, 2020  11:08 AM

## 2020-08-19 NOTE — OP NOTE
Procedure Date: 08/19/2020      PREOPERATIVE DIAGNOSES:  Lumbar stenosis with radiculopathy.      POSTOPERATIVE DIAGNOSES:  Lumbar stenosis with radiculopathy.      PROCEDURES PERFORMED:  Left L4-5 minimally invasive decompression and left L5-S1 minimally invasive microdiskectomy.      SURGEON:  Girma Solis MD      ASSISTANT:  Jonathan Gurrola PA-C      ANESTHESIA:  General endotracheal anesthesia plus local anesthetic.      ESTIMATED BLOOD LOSS:  25 mL      INDICATIONS FOR PROCEDURE:  The patient is a 74-year-old man with back and left lower extremity pain, worse when walking.  The MRI demonstrates both a disk osteophyte complex at L5 to S1 as well as lateral recess stenosis at L4 to L5.  The patient was brought for minimally invasive decompression and diskectomy. Please note that Jonathan Gurrola PA-C's assistance was needed for positioning, retraction, suctioning, and closure.      DESCRIPTION OF PROCEDURE:  The patient was brought to the operating room, general endotracheal anesthesia was induced.  The patient was rolled in the prone position on the Morris frame.  The back was prepped and draped in sterile fashion.  C-arm fluoroscopy was used to localize the appropriate levels and a left-sided approach was performed using the [employer] tubular dilating system.  First, the L5 to S1 level was addressed and the retractor was confirmed to be in the appropriate position.  The microscope was sterilely draped and brought into the field and the high-speed drill then used to perform a hemilaminotomy and medial facetectomy.  The ligamentum flavum was elevated and the underlying thecal sac and nerve root identified.  Underneath the nerve root, there was a significant bone spur and disk osteophyte complex.  The combination of rongeurs and curettes were used to remove the disk and osteophyte complex, decompressing the nerve.  Once hemostasis was achieved at this level, then the retractor was moved up to the L4 to L5  level and again confirmed with fluoroscopy.  Again, the medial hemilaminotomy and medial facetectomy were performed at L4 to L5 and the ligamentum flavum elevated and the facet hypertrophy and ligamentous hypertrophy overlying the nerves was removed on the left side, decompressing the nerves on this side.  Once this was done, hemostasis was achieved.  The wound was copiously irrigated.  Local anesthetic was infiltrated into the wound.  The fascia was closed with 0 interrupted Vicryl, 2-0 inverted interrupted Vicryl was used for subcutaneous layer and a 4-0 subcuticular Monocryl was used for skin.  Exofin was placed as a dressing.  The patient was rolled back in supine position, awakened, extubated and taken to the recovery room in good condition.         ROJAS HARRY MD             D: 2020   T: 2020   MT: HAI      Name:     DONNA CLEVELAND   MRN:      [id number]        Account:        [id number]   :      1945           Procedure Date: 2020      Document: [id number]
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
## 2020-08-19 NOTE — DISCHARGE INSTRUCTIONS
DR. ROJAS HARRY M.D.                    CLINIC PHONE NUMBER:  [phone]  [facility]          GENERAL ANESTHESIA OR SEDATION ADULT DISCHARGE INSTRUCTIONS   SPECIAL PRECAUTIONS FOR 24 HOURS AFTER SURGERY    IT IS NOT UNUSUAL TO FEEL LIGHT-HEADED OR FAINT, UP TO 24 HOURS AFTER SURGERY OR WHILE TAKING PAIN MEDICATION.  IF YOU HAVE THESE SYMPTOMS; SIT FOR A FEW MINUTES BEFORE STANDING AND HAVE SOMEONE ASSIST YOU WHEN YOU GET UP TO WALK OR USE THE BATHROOM.    YOU SHOULD REST AND RELAX FOR THE NEXT 24 HOURS AND YOU MUST MAKE ARRANGEMENTS TO HAVE SOMEONE STAY WITH YOU FOR AT LEAST 24 HOURS AFTER YOUR DISCHARGE.  AVOID HAZARDOUS AND STRENUOUS ACTIVITIES.  DO NOT MAKE IMPORTANT DECISIONS FOR 24 HOURS.    DO NOT DRIVE ANY VEHICLE OR OPERATE MECHANICAL EQUIPMENT FOR 24 HOURS FOLLOWING THE END OF YOUR SURGERY.  EVEN THOUGH YOU MAY FEEL NORMAL, YOUR REACTIONS MAY BE AFFECTED BY THE MEDICATION YOU HAVE RECEIVED.    DO NOT DRINK ALCOHOLIC BEVERAGES FOR 24 HOURS FOLLOWING YOUR SURGERY.    DRINK CLEAR LIQUIDS (APPLE JUICE, GINGER ALE, 7-UP, BROTH, ETC.).  PROGRESS TO YOUR REGULAR DIET AS YOU FEEL ABLE.    YOU MAY HAVE A DRY MOUTH, A SORE THROAT, MUSCLES ACHES OR TROUBLE SLEEPING.  THESE SHOULD GO AWAY AFTER 24 HOURS.    CALL YOUR DOCTOR FOR ANY OF THE FOLLOWING:  SIGNS OF INFECTION (FEVER, GROWING TENDERNESS AT THE SURGERY SITE, A LARGE AMOUNT OF DRAINAGE OR BLEEDING, SEVERE PAIN, FOUL-SMELLING DRAINAGE, REDNESS OR SWELLING.    IT HAS BEEN OVER 8 TO 10 HOURS SINCE SURGERY AND YOU ARE STILL NOT ABLE TO URINATE (PASS WATER).     Maximum acetaminophen (Tylenol) dose from all sources should not exceed 4 grams (4000 mg) per day. You had tylenol 975 mg at 0900 am    You received Toradol, an IV form of ibuprofen (Motrin) at 0930.  Do not take any ibuprofen products until 3:30.

## 2020-08-19 NOTE — ANESTHESIA CARE TRANSFER NOTE
Patient: Jaylen De La Vega    Procedure(s):  Left Lumbar 4-5 minimally invasive decompression and Left Lumbar 5-Sacral 1 microdiscectomy    Diagnosis: Spinal stenosis of lumbar region with radiculopathy [M48.061, M54.16]  Diagnosis Additional Information: No value filed.    Anesthesia Type:   General     Note:  Airway :Face Mask  Patient transferred to:PACU  Comments: Pt transferred to PACU: VSS; Report to RNHandoff Report: Identifed the Patient, Identified the Reponsible Provider, Reviewed the pertinent medical history, Discussed the surgical course, Reviewed Intra-OP anesthesia mangement and issues during anesthesia, Set expectations for post-procedure period and Allowed opportunity for questions and acknowledgement of understanding      Vitals: (Last set prior to Anesthesia Care Transfer)    CRNA VITALS  8/19/2020 1028 - 8/19/2020 1103      8/19/2020             NIBP:  140/87    NIBP Mean:  105                Electronically Signed By: CLARY Brand CRNA  August 19, 2020  11:03 AM

## 2020-08-20 ENCOUNTER — TELEPHONE (OUTPATIENT)
Dept: NEUROSURGERY | Facility: CLINIC | Age: 75
End: 2020-08-20

## 2020-08-20 NOTE — TELEPHONE ENCOUNTER
Contacted patient for post-op follow up call. Patient is s/p L4-5 MIS decompression on 8/19.     Post-Op Pain Management:   After surgery patient is doing well overall, but reports minimal incisional and leg pain.   Pain is controlled by oxycodone.   Reminded patient to apply ice to help with pain management as well.    Incision Care:   Patient denies issues or concerns with incision today.   Reminded to watch for s/sx of infection: drainage, redness, swelling, warmth, and fever.   Advised proper incision care: No submerging incision for 8 weeks or until it is fully healed. Okay to shower, use soap, and gently pat dry.   Advised to call clinic with any incision questions or concerns.     Post-Op Activity Restrictions:   Reminded patient to follow activity restrictions for the next 6 weeks: No heavy lifting more than 10lbs, limit repetitive bending/twisting.     Nutrition:   Patient has been eating and drinking well.   No issues with bowel or bladder habits.   Advised patient to increase fluid, fiber intake, and use OTC stool softener if needed.     Follow-Up:  Patient scheduled for post-op visits.   Advised to call our clinic with any post-op questions or concerns: [phone]

## 2020-09-02 DIAGNOSIS — M48.061 SPINAL STENOSIS OF LUMBAR REGION WITH RADICULOPATHY: ICD-10-CM

## 2020-09-02 DIAGNOSIS — M54.16 SPINAL STENOSIS OF LUMBAR REGION WITH RADICULOPATHY: ICD-10-CM

## 2020-09-02 RX ORDER — METHOCARBAMOL 500 MG/1
500 TABLET, FILM COATED ORAL 4 TIMES DAILY PRN
Qty: 30 TABLET | Refills: 0 | Status: SHIPPED | OUTPATIENT
Start: 2020-09-02 | End: 2020-09-11

## 2020-09-02 RX ORDER — OXYCODONE HYDROCHLORIDE 5 MG/1
5 TABLET ORAL EVERY 6 HOURS PRN
Qty: 30 TABLET | Refills: 0 | Status: SHIPPED | OUTPATIENT
Start: 2020-09-02 | End: 2020-09-11

## 2020-09-02 NOTE — TELEPHONE ENCOUNTER
Reached out to patient to ask if he is OK with a telephone visit as there are no sutures or staples that needs to be removed. He is OK with it being a telephone visit. Appointment changed.     DOS: 8/19/2020  Procedure: Left L4-5 minimally invasive decompression and left L5-S1 minimally invasive microdiskectomy   Surgeon: Girma Solis MD     Patient complains of muscles spasms that starts at his left buttock that radiates down to the outside and back of his calf. He states it is worse at night. We discussed of symptoms he may experience throughout recovery and he verbalized understanding. Patient has ran out of Oxycodone and Robaxin. He is requesting for refills of this today. He was taking Oxycodone and Robaxin which provided pain relief. He has also been using ice frequently throughout the day as it does help. We discussed he could use heat but to avoid directly over the incision site.     Medications pended for your approval, if appropriate. Pharmacy verified. He understands refills may not be sent today as it is near the end of the day. Patient has nurse visit appointment tomorrow so he was advised to check on status then if not filled today.

## 2020-09-03 ENCOUNTER — E-VISIT (OUTPATIENT)
Dept: FAMILY MEDICINE | Facility: CLINIC | Age: 75
End: 2020-09-03

## 2020-09-03 ENCOUNTER — VIRTUAL VISIT (OUTPATIENT)
Dept: NEUROSURGERY | Facility: CLINIC | Age: 75
End: 2020-09-03
Attending: NEUROLOGICAL SURGERY
Payer: COMMERCIAL

## 2020-09-03 DIAGNOSIS — N40.1 BENIGN PROSTATIC HYPERPLASIA WITH URINARY FREQUENCY: Primary | ICD-10-CM

## 2020-09-03 DIAGNOSIS — R35.0 BENIGN PROSTATIC HYPERPLASIA WITH URINARY FREQUENCY: Primary | ICD-10-CM

## 2020-09-03 DIAGNOSIS — Z98.890 S/P LUMBAR MICRODISCECTOMY: Primary | ICD-10-CM

## 2020-09-03 PROCEDURE — 99207 ZZC NO CHARGE NURSE ONLY: CPT | Mod: TEL

## 2020-09-03 PROCEDURE — 40000269 ZZH STATISTIC NO CHARGE FACILITY FEE: Mod: TEL

## 2020-09-03 NOTE — PROGRESS NOTES
"Telephone Encounter: Nurse Visit for Incision Check      S:  Patient is s/p Left L4-5 minimally invasive decompression and left L5-S1 minimally invasive microdiskectomyon 8/19/20 with Dr. Solis. Patient states 7/10 pain rating. He reports Muscle spasms in left low back and left buttock, feels like spasms are worse when lying down. Pain in left leg as well. He denies tingling/numbness , or denies foot drop to lower extremities, mild left leg weakness . No falls reported. For pain management, patient is taking ES tylenol. He ran out of Robaxin and Oxycodone a week ago he said. Looks like medications were refilled yesterday 9/2/20.  Discussed with patient that once he resumes his pain meds and muscle relaxer should help him get back on track with pain mgmt. He will call and update if he feels like the Robaxin is not helping his spasms.  Patient also denies any fever, cough, or SOB today. Patient is also reporting concerns with urinating. Having some continued retention and not able to full empty his bladder.  He said he cannot take ibuprofen as this makes that issues worse and reports having an enlarged prostate. Discussed with care team and Per Laura Strickland PA-C patient to contact his PCP regarding this for further evaluation. Patient verbalized understanding.       O:  Patient assessed incision while discussing over the phone. He reports incision \"looks good\".  Denies any redness, swelling, drainage, dehiscence, warmth or fevers. Encouraged patient to send a picture of incision through my chart.     A: S/p Left L4-5 minimally invasive decompression and left L5-S1 minimally invasive microdiskectomy. Recovering well overall.  P:    - Keep your incision clean and dry at all times. No bathing, swimming, or submerging in water until incision is well healed.  Call clinic or go to Emergency Room if you develop any new pain, drainage, swelling, or fever.  - No lifting greater than 10-15 pounds. No bending or twisting, or over " head reaching.   - Return as scheduled for post op follow-up      -Please call clinic with any further questions or concerns: [phone]     Rosi Shaikh RN  [facility]   [address] [address]     Tel [phone]

## 2020-09-04 DIAGNOSIS — M48.061 SPINAL STENOSIS OF LUMBAR REGION WITH RADICULOPATHY: ICD-10-CM

## 2020-09-04 DIAGNOSIS — M54.16 SPINAL STENOSIS OF LUMBAR REGION WITH RADICULOPATHY: ICD-10-CM

## 2020-09-04 PROBLEM — R35.0 BENIGN PROSTATIC HYPERPLASIA WITH URINARY FREQUENCY: Status: ACTIVE | Noted: 2020-09-04

## 2020-09-04 PROBLEM — N40.1 BENIGN PROSTATIC HYPERPLASIA WITH URINARY FREQUENCY: Status: ACTIVE | Noted: 2020-09-04

## 2020-09-04 RX ORDER — METHOCARBAMOL 500 MG/1
500 TABLET, FILM COATED ORAL 4 TIMES DAILY PRN
Qty: 30 TABLET | Refills: 0 | Status: CANCELLED | OUTPATIENT
Start: 2020-09-04

## 2020-09-04 RX ORDER — OXYCODONE HYDROCHLORIDE 5 MG/1
5 TABLET ORAL EVERY 6 HOURS PRN
Qty: 30 TABLET | Refills: 0 | Status: CANCELLED | OUTPATIENT
Start: 2020-09-04

## 2020-09-04 RX ORDER — TAMSULOSIN HYDROCHLORIDE 0.4 MG/1
0.4 CAPSULE ORAL DAILY
Qty: 90 CAPSULE | Refills: 1 | Status: SHIPPED | OUTPATIENT
Start: 2020-09-04 | End: 2021-03-12

## 2020-09-04 NOTE — PATIENT INSTRUCTIONS
Patient Education     Benign Prostatic Hyperplasia    The prostate is a small gland that in men that makes a fluid that goes into semen. As you age, the prostate grows. If it becomes too big, it may cause problems with urination. This condition is called benign prostatic hyperplasia (BPH). BPH is not a cancer.  Symptoms of BPH  BPH is common in men over age 60. That s because the prostate grows bigger during a man s life. As it grows, it presses against the urethra. The urethra is the tube that carries urine out of your body from your bladder through your penis. Your bladder may also weaken as you age. It may not empty completely after you urinate.  Men with BPH may have these symptoms:    The urge to frequently urinate, especially at night    Leaking or dribbling of urine    A weak stream of urine    Not able to urinate, or having trouble starting to urinate  Diagnosing BPH  BPH can hurt your bladder and kidneys. It can also lead to bladder stones and urinary tract infections. If you think you may have BPH, talk with your healthcare provider. Early treatment can prevent problems.  Several tests can diagnose BPH. These include:    Digital rectal exam. During this procedure, your provider puts a gloved, greased (lubricated) finger into your rectum to check the size of your prostate.    Imaging tests. X-rays and other imaging tests can find problems in your kidneys or bladder.    Cystoscopy. This test uses a flexible tube with a camera (called a scope). The scope is passed up the urethra to look inside your urinary tract.    Urine flow study. This test uses a special device to see how fast urine leaves your body.    Prostate ultrasound. This test uses sound waves to view the size and inside of the prostate gland.  Treating BPH  If you have mild symptoms, you may not need treatment. You may be able to control your BPH with lifestyle changes. Some men feel better if they limit or avoid alcohol and caffeinated drinks  like coffee. Not drinking too many fluids at night can also help. Increasing your physical activity may ease symptoms, too.  Kegel exercises may also help. They strengthen the pelvic muscle to prevent urine from leaking. While urinating, contract your pelvic muscle to stop or slow down the flow of urine. Hold for 10 seconds. Repeat at least 5 times. Do the exercise 3 to 5 times each day.  Certain medicines can worsen BPH symptoms. These include medicines for congestion, allergies, and depression. Medicines that increase your urine flow (diuretics or water pills) can also worsen BPH symptoms. If you take any of these, talk with your provider. You may need to take another medicine or change how much you take.  BPH symptoms often get worse as the prostate grows. So at some point you may need treatment. Your provider may prescribe medicine to shrink the prostate or stop its growth. Other treatments can make the urethra wider to let urine flow more easily. There are also some minimally invasive techniques to remove prostate tissue.  If your BPH is severe, your healthcare provider may recommend surgery. Surgery takes out enlarged parts of the prostate gland. Your healthcare provider can figure out the best option for you based on your age, overall health, and other factors.  BPH and prostate cancer  BPH and prostate cancer share some symptoms. That s why it s important to talk with your provider about your symptoms. Men with BPH aren t more likely to develop this cancer. But they may have higher levels of the prostate-specific antigen (PSA). A higher PSA level may also be a sign of prostate cancer. Certain tests help tell BPH from prostate cancer. They include prostate ultrasound and biopsy.  Date Last Reviewed: 6/1/2017 2000-2019 [employer]. [address] [place] [address], [place] [address]. All rights reserved. This information is not intended as a substitute for professional medical care. Always follow your  healthcare professional's instructions.

## 2020-09-04 NOTE — TELEPHONE ENCOUNTER
Another duplicate message. Patient had medication filled yesterday. Discussed with patient number to call and how to leave a message on the nurse line if he needs a refill. He stated understanding no further questions.

## 2020-09-04 NOTE — TELEPHONE ENCOUNTER
Pharmacy faxed over a refill for Methocarbamol 500 mg tab. Entered encounter and pended for approval.     Robel Sanders CMA on 9/4/2020 at 9:04 AM

## 2020-09-04 NOTE — TELEPHONE ENCOUNTER
Called patient to see if he was requesting a refill or if the pharmacy sent the fax in error.    Patient reported that he just got a refill yesterday so to disregard. Deleted Oxycodone request. No further questions.

## 2020-09-04 NOTE — TELEPHONE ENCOUNTER
Pharmacy faxed over refill request for oxycodone 5 mg tab. Entered encounter and pended for approval.     Robel Sanders CMA on 9/4/2020 at 8:36 AM

## 2020-09-11 DIAGNOSIS — M54.16 SPINAL STENOSIS OF LUMBAR REGION WITH RADICULOPATHY: ICD-10-CM

## 2020-09-11 DIAGNOSIS — M48.061 SPINAL STENOSIS OF LUMBAR REGION WITH RADICULOPATHY: ICD-10-CM

## 2020-09-11 RX ORDER — OXYCODONE HYDROCHLORIDE 5 MG/1
5 TABLET ORAL EVERY 6 HOURS PRN
Qty: 30 TABLET | Refills: 0 | Status: SHIPPED | OUTPATIENT
Start: 2020-09-11 | End: 2020-09-28

## 2020-09-11 RX ORDER — METHOCARBAMOL 500 MG/1
500 TABLET, FILM COATED ORAL 4 TIMES DAILY PRN
Qty: 30 TABLET | Refills: 0 | Status: SHIPPED | OUTPATIENT
Start: 2020-09-11 | End: 2020-09-28

## 2020-09-11 NOTE — TELEPHONE ENCOUNTER
Patient is s/p left L4-5 minimally invasive decompression and left L5-S1 minimally invasive microdiskectomy with Dr. Solis on 8/19. Nurse visit on 9/3.    Patient LVM on RN line requesting a call back. Called and spoke to the patient.  He states that he is having muscle spasms on the left side that start at his buttocks and radiate down to his ankle.     He needs a refill of Robaxin, will send to provider to review but informed patient that it may not get done today. He is taking one tab 4 times daily. He would also like a refill of Oxycodone.

## 2020-09-28 DIAGNOSIS — M54.16 SPINAL STENOSIS OF LUMBAR REGION WITH RADICULOPATHY: ICD-10-CM

## 2020-09-28 DIAGNOSIS — M48.061 SPINAL STENOSIS OF LUMBAR REGION WITH RADICULOPATHY: ICD-10-CM

## 2020-09-28 DIAGNOSIS — Z98.890 S/P LUMBAR MICRODISCECTOMY: Primary | ICD-10-CM

## 2020-09-28 RX ORDER — OXYCODONE HYDROCHLORIDE 5 MG/1
5 TABLET ORAL EVERY 6 HOURS PRN
Qty: 30 TABLET | Refills: 0 | Status: SHIPPED | OUTPATIENT
Start: 2020-09-28 | End: 2021-01-30

## 2020-09-28 RX ORDER — METHOCARBAMOL 500 MG/1
500 TABLET, FILM COATED ORAL 4 TIMES DAILY PRN
Qty: 30 TABLET | Refills: 0 | Status: SHIPPED | OUTPATIENT
Start: 2020-09-28 | End: 2020-10-21

## 2020-09-28 NOTE — TELEPHONE ENCOUNTER
"Prescription Refill Request    Medication: Oxycodone and Robaxin    Surgical procedure/date: Left L4-5 minimally invasive decompression and left L5-S1 minimally invasive microdiskectomy on 08/19/2020 by Dr. Solis    Current regimen/number of tabs per day: Oxycodone- 2 tabs total daily.  Robaxin-  3 tabs daily   *Ibuprofen and Tylenol prn  *Icing     Last refill:  09/11/2020 #30    Pain Assessment: 7/10 (at night the pain jumps to a 10). Described as a spasm. Starts at left buttock and and goes down to left ankle, different than prior to surgery but started right after surgery.  *Robaxin and Oxycodone help provide relief per patient.     location: [facility] [place]     Any patient questions or concerns: feels for the most part things are getting a little better, except when the spasms hit feels like \"surgery was yesterday\"  Follow up appt on 10/05/2020 with Nikko Gurrola PA-C  Will forward request to care team for approval.   "

## 2020-10-05 ENCOUNTER — OFFICE VISIT (OUTPATIENT)
Dept: NEUROSURGERY | Facility: CLINIC | Age: 75
End: 2020-10-05
Attending: PHYSICIAN ASSISTANT
Payer: COMMERCIAL

## 2020-10-05 VITALS
BODY MASS INDEX: 26.18 KG/M2 | OXYGEN SATURATION: 95 % | HEIGHT: 71 IN | TEMPERATURE: 98 F | HEART RATE: 101 BPM | SYSTOLIC BLOOD PRESSURE: 145 MMHG | WEIGHT: 187 LBS | DIASTOLIC BLOOD PRESSURE: 68 MMHG

## 2020-10-05 DIAGNOSIS — Z98.890 S/P LUMBAR MICRODISCECTOMY: Primary | ICD-10-CM

## 2020-10-05 PROCEDURE — G0463 HOSPITAL OUTPT CLINIC VISIT: HCPCS

## 2020-10-05 PROCEDURE — 99024 POSTOP FOLLOW-UP VISIT: CPT | Performed by: PHYSICIAN ASSISTANT

## 2020-10-05 RX ORDER — METHYLPREDNISOLONE 4 MG
TABLET, DOSE PACK ORAL
Qty: 21 TABLET | Refills: 0 | Status: SHIPPED | OUTPATIENT
Start: 2020-10-05 | End: 2020-10-21

## 2020-10-05 RX ORDER — METHOCARBAMOL 500 MG/1
500 TABLET, FILM COATED ORAL 4 TIMES DAILY PRN
Qty: 30 TABLET | Refills: 0 | Status: SHIPPED | OUTPATIENT
Start: 2020-10-05 | End: 2021-01-30

## 2020-10-05 ASSESSMENT — PAIN SCALES - GENERAL: PAINLEVEL: SEVERE PAIN (7)

## 2020-10-05 ASSESSMENT — MIFFLIN-ST. JEOR: SCORE: 1615.36

## 2020-10-05 NOTE — LETTER
10/5/2020         RE: Jaylen De La Vega  [address]  [place] [address]        Dear Colleague,    Thank you for referring your patient, Jaylen De La Vega, to the [facility]UROSURGERY [facility] [place]. Please see a copy of my visit note below.    NEUROSURGERY CLINIC PROGRESS NOTE    DATE OF VISIT: 10/5/2020    HPI:     Jaylen De La Vega is a pleasant 74 year old male who presents to the clinic today for a 6-week post-operative follow-up visit. On 08/19/2020 the patient underwent a left lumbar 4-5 minimally invasive decompression and Left lumbar 5-sacral 1 microdiscectomy with Dr. Solis for spinal stenosis of lumbar region with radiculopathy. Per chart review, the patient's pre-operative symptoms consisted of a 5 year history of exacerbating symptoms.  Today, the patient reports that overall he is doing better, although not significantly. He does endorse improvement with his strength and pain, yet, he is still experiencing frequent cramping and discomfort. He has reduced his narcotic intake, but continues to ice and use muscle relaxants up to four times a day. He is independently ambulating a half mile a day.     Current Outpatient Medications   Medication     aspirin 81 MG tablet     docusate sodium (COLACE) 100 MG capsule     glimepiride (AMARYL) 4 MG tablet     hydrochlorothiazide (HYDRODIURIL) 25 MG tablet     losartan (COZAAR) 100 MG tablet     metFORMIN (GLUCOPHAGE-XR) 500 MG 24 hr tablet     methocarbamol (ROBAXIN) 500 MG tablet     methocarbamol (ROBAXIN) 500 MG tablet     methylPREDNISolone (MEDROL DOSEPAK) 4 MG tablet therapy pack     nitroGLYcerin (NITROSTAT) 0.4 MG sublingual tablet     ONETOUCH DELICA LANCETS 33G MISC     ONETOUCH VERIO IQ test strip     oxyCODONE (ROXICODONE) 5 MG tablet     simvastatin (ZOCOR) 20 MG tablet     sitagliptin (JANUVIA) 25 MG tablet     tamsulosin (FLOMAX) 0.4 MG capsule     umeclidinium (INCRUSE ELLIPTA) 62.5 MCG/INH inhaler     vitamin B-Complex     VITAMIN D  "OR     albuterol (PROAIR HFA/PROVENTIL HFA/VENTOLIN HFA) 108 (90 Base) MCG/ACT inhaler     No current facility-administered medications for this visit.        Allergies   Allergen Reactions     No Known Allergies        Past Medical History:   Diagnosis Date     Arthritis     mishel  fingers     Asthma      Cellulitis and abscess of face [id number]    abstr [id number]     COPD (chronic obstructive pulmonary disease) (H)      Diabetes (H)      Diverticulitis      Hyperlipemia      Hypertension        Review Of Systems    Skin: negative  Eyes: negative  Ears/Nose/Throat: negative  Respiratory: No shortness of breath, dyspnea on exertion, cough, or hemoptysis  Cardiovascular: negative  Gastrointestinal: negative  Musculoskeletal: negative  Neurologic: positive for cramping and numbness or tingling of feet  Psychiatric: negative  Hematologic/Lymphatic/Immunologic: negative  Endocrine: negative    OBJECTIVE:    BP (!) 145/68 (BP Location: Right arm, Patient Position: Sitting, Cuff Size: Adult Regular)   Pulse 101   Temp 98  F (36.7  C) (Oral)   Ht 5' 11\" (1.803 m)   Wt 187 lb (84.8 kg)   SpO2 95%   BMI 26.08 kg/m      Exam:    Patient appears comfortable and in no apparent distress. Moving all extremities.  Gait is non-antalgic.  CN II-XII grossly intact, alert and appropriate with conversation and following  commands  Bilateral upper extremities with full strength including hand intrinsics and grasp.  Sensation intact throughout.  Bilateral lower extremities 5/5 strength including plantar and dorsiflexion, although left dies feel a bit weaker.  Diminished sensation throughout bilaterally - baseline.  Lumbar incision edges well approximated. Absent for edema, erythema, or drainage.    ASSESSMENT:    1. S/P lumbar microdiscectomy        PLAN:    Jaylen De La Vega is 6 weeks out from a left lumbar 4-5 minimally invasive decompression and left lumbar 5-sacral 1 microdiscectomy  performed by Dr. Solis on 08/19. Today the patient " reports that overall he is doing better, although not significantly. He does endorse improvement with his strength and pain, yet, he is still experiencing frequent cramping and discomfort. He has reduced his narcotic intake, but continues to ice and use muscle relaxants up to four times a day. He is independently ambulating a half mile a day.     The patient has remained compliant with the post-operative restrictions which included not lifting anything greater than 5-10 lbs. Today we discussed increasing activity from 10 lbs by 2-5 lbs per week, but encouraged continuing to avoid excessive bending, twisting, and turning at the waist and to avoid jostling and jarring activities. We also provided him with a MDP and refill for his robaxin.     Mr. De La Vega will return to the clinic in 6 weeks. If his symptoms do not improve we will obtain a lumbar MRI W/WO.    The patient gave verbal understanding and is in agreement with the above plan. He will call or return to the clinic for any worsening or changes in symptoms.    Respectfully,     ROCKY Dean PA-C        Again, thank you for allowing me to participate in the care of your patient.        Sincerely,        Jonathan Gurrola PA-C

## 2020-10-05 NOTE — NURSING NOTE
"Jaylen De La Vega is a 74 year old male who presents for:  Chief Complaint   Patient presents with     Neurologic Problem     6 week Post Op. Left L4-L5 min invasive decompression and left L5-S1 microdisectomy        Initial Vitals:  BP (!) 145/68 (BP Location: Right arm, Patient Position: Sitting, Cuff Size: Adult Regular)   Pulse 101   Temp 98  F (36.7  C) (Oral)   Ht 5' 11\" (1.803 m)   Wt 187 lb (84.8 kg)   SpO2 95%   BMI 26.08 kg/m   Estimated body mass index is 26.08 kg/m  as calculated from the following:    Height as of this encounter: 5' 11\" (1.803 m).    Weight as of this encounter: 187 lb (84.8 kg).. Body surface area is 2.06 meters squared. BP completed using cuff size: regular  Severe Pain (7)    Nursing Comments: see Chief complaint.     Robel Sanders, VINAY    "

## 2020-10-05 NOTE — PROGRESS NOTES
NEUROSURGERY CLINIC PROGRESS NOTE    DATE OF VISIT: 10/5/2020    HPI:     Jaylen De La Vega is a pleasant 74 year old male who presents to the clinic today for a 6-week post-operative follow-up visit. On 08/19/2020 the patient underwent a left lumbar 4-5 minimally invasive decompression and Left lumbar 5-sacral 1 microdiscectomy with Dr. Solis for spinal stenosis of lumbar region with radiculopathy. Per chart review, the patient's pre-operative symptoms consisted of a 5 year history of exacerbating symptoms.  Today, the patient reports that overall he is doing better, although not significantly. He does endorse improvement with his strength and pain, yet, he is still experiencing frequent cramping and discomfort. He has reduced his narcotic intake, but continues to ice and use muscle relaxants up to four times a day. He is independently ambulating a half mile a day.     Current Outpatient Medications   Medication     aspirin 81 MG tablet     docusate sodium (COLACE) 100 MG capsule     glimepiride (AMARYL) 4 MG tablet     hydrochlorothiazide (HYDRODIURIL) 25 MG tablet     losartan (COZAAR) 100 MG tablet     metFORMIN (GLUCOPHAGE-XR) 500 MG 24 hr tablet     methocarbamol (ROBAXIN) 500 MG tablet     methocarbamol (ROBAXIN) 500 MG tablet     methylPREDNISolone (MEDROL DOSEPAK) 4 MG tablet therapy pack     nitroGLYcerin (NITROSTAT) 0.4 MG sublingual tablet     ONETOUCH DELICA LANCETS 33G MISC     ONETOUCH VERIO IQ test strip     oxyCODONE (ROXICODONE) 5 MG tablet     simvastatin (ZOCOR) 20 MG tablet     sitagliptin (JANUVIA) 25 MG tablet     tamsulosin (FLOMAX) 0.4 MG capsule     umeclidinium (INCRUSE ELLIPTA) 62.5 MCG/INH inhaler     vitamin B-Complex     VITAMIN D OR     albuterol (PROAIR HFA/PROVENTIL HFA/VENTOLIN HFA) 108 (90 Base) MCG/ACT inhaler     No current facility-administered medications for this visit.        Allergies   Allergen Reactions     No Known Allergies        Past Medical History:   Diagnosis Date      "Arthritis     mishel  fingers     Asthma      Cellulitis and abscess of face [id number]    abstr [id number]     COPD (chronic obstructive pulmonary disease) (H)      Diabetes (H)      Diverticulitis      Hyperlipemia      Hypertension        Review Of Systems    Skin: negative  Eyes: negative  Ears/Nose/Throat: negative  Respiratory: No shortness of breath, dyspnea on exertion, cough, or hemoptysis  Cardiovascular: negative  Gastrointestinal: negative  Musculoskeletal: negative  Neurologic: positive for cramping and numbness or tingling of feet  Psychiatric: negative  Hematologic/Lymphatic/Immunologic: negative  Endocrine: negative    OBJECTIVE:    BP (!) 145/68 (BP Location: Right arm, Patient Position: Sitting, Cuff Size: Adult Regular)   Pulse 101   Temp 98  F (36.7  C) (Oral)   Ht 5' 11\" (1.803 m)   Wt 187 lb (84.8 kg)   SpO2 95%   BMI 26.08 kg/m      Exam:    Patient appears comfortable and in no apparent distress. Moving all extremities.  Gait is non-antalgic.  CN II-XII grossly intact, alert and appropriate with conversation and following  commands  Bilateral upper extremities with full strength including hand intrinsics and grasp.  Sensation intact throughout.  Bilateral lower extremities 5/5 strength including plantar and dorsiflexion, although left dies feel a bit weaker.  Diminished sensation throughout bilaterally - baseline.  Lumbar incision edges well approximated. Absent for edema, erythema, or drainage.    ASSESSMENT:    1. S/P lumbar microdiscectomy        PLAN:    Jaylen De La Vega is 6 weeks out from a left lumbar 4-5 minimally invasive decompression and left lumbar 5-sacral 1 microdiscectomy  performed by Dr. Solis on 08/19. Today the patient reports that overall he is doing better, although not significantly. He does endorse improvement with his strength and pain, yet, he is still experiencing frequent cramping and discomfort. He has reduced his narcotic intake, but continues to ice and use muscle " relaxants up to four times a day. He is independently ambulating a half mile a day.     The patient has remained compliant with the post-operative restrictions which included not lifting anything greater than 5-10 lbs. Today we discussed increasing activity from 10 lbs by 2-5 lbs per week, but encouraged continuing to avoid excessive bending, twisting, and turning at the waist and to avoid jostling and jarring activities. We also provided him with a MDP and refill for his robaxin.     Mr. De La Vega will return to the clinic in 6 weeks. If his symptoms do not improve we will obtain a lumbar MRI W/WO.    The patient gave verbal understanding and is in agreement with the above plan. He will call or return to the clinic for any worsening or changes in symptoms.    Respectfully,     ROCKY Dean PA-C

## 2020-10-21 ENCOUNTER — VIRTUAL VISIT (OUTPATIENT)
Dept: FAMILY MEDICINE | Facility: CLINIC | Age: 75
End: 2020-10-21
Payer: COMMERCIAL

## 2020-10-21 VITALS — OXYGEN SATURATION: 95 % | DIASTOLIC BLOOD PRESSURE: 68 MMHG | SYSTOLIC BLOOD PRESSURE: 120 MMHG | HEART RATE: 104 BPM

## 2020-10-21 DIAGNOSIS — J44.9 CHRONIC OBSTRUCTIVE PULMONARY DISEASE, UNSPECIFIED COPD TYPE (H): ICD-10-CM

## 2020-10-21 DIAGNOSIS — E11.9 TYPE 2 DIABETES MELLITUS WITHOUT COMPLICATION, WITHOUT LONG-TERM CURRENT USE OF INSULIN (H): Primary | ICD-10-CM

## 2020-10-21 PROBLEM — I50.32 CHRONIC DIASTOLIC CONGESTIVE HEART FAILURE (H): Status: RESOLVED | Noted: 2020-10-21 | Resolved: 2020-10-21

## 2020-10-21 PROBLEM — I50.32 CHRONIC DIASTOLIC CONGESTIVE HEART FAILURE (H): Status: ACTIVE | Noted: 2020-10-21

## 2020-10-21 PROCEDURE — 99214 OFFICE O/P EST MOD 30 MIN: CPT | Mod: 95 | Performed by: FAMILY MEDICINE

## 2020-10-21 NOTE — PATIENT INSTRUCTIONS
Diabetes education: If you have not heard from the scheduling office within 2 business days, please call [phone] for [facility] or [phone] for [facility].

## 2020-10-21 NOTE — PROGRESS NOTES
"Jaylen De La Vega is a 74 year old male who is being evaluated via a billable video visit.      The patient has been notified of following:     \"This video visit will be conducted via a call between you and your physician/provider. We have found that certain health care needs can be provided without the need for an in-person physical exam.  This service lets us provide the care you need with a video conversation.  If a prescription is necessary we can send it directly to your pharmacy.  If lab work is needed we can place an order for that and you can then stop by our lab to have the test done at a later time.    Video visits are billed at different rates depending on your insurance coverage.  Please reach out to your insurance provider with any questions.    If during the course of the call the physician/provider feels a video visit is not appropriate, you will not be charged for this service.\"    Patient has given verbal consent for Video visit? Yes  How would you like to obtain your AVS? MyChart  If you are dropped from the video visit, the video invite should be resent to: Text to cell phone: [phone]  Will anyone else be joining your video visit? No      Subjective     Jaylen De La Vega is a 74 year old male who presents today via video visit for the following health issues:    History of Present Illness       Diabetes:   He presents for follow up of diabetes.  He is checking home blood glucose one time daily. He is aware of hypoglycemia symptoms including weakness.          He eats 2-3 servings of fruits and vegetables daily.He consumes 0 sweetened beverage(s) daily.He exercises with enough effort to increase his heart rate 20 to 29 minutes per day.  He exercises with enough effort to increase his heart rate 6 days per week.   He is taking medications regularly.    Has been on prednisone due to back pain which drove sugars up to 300 range.  Last dose was about a week ago.         Chronic problems general " questions HPI Form    Diabetes  Frequency of checking blood sugars:: one time daily  Hypoglycemia symptoms:: weakness  Dietary sodium intake:: Yes  How many servings of fruits and vegetables do you eat daily?: 2-3  On average, how many sweetened beverages do you drink each day (Examples: soda, juice, sweet tea, etc.  Do NOT count diet or artificially sweetened beverages)?: 0  How many minutes a day do you exercise enough to make your heart beat faster?: 20 to 29  How many days a week do you exercise enough to make your heart beat faster?: 6  How many days per week do you miss taking your medication?: 0    Hyperlipidemia Follow-Up      Are you regularly taking any medication or supplement to lower your cholesterol?   Yes    Are you having muscle aches or other side effects that you think could be caused by your cholesterol lowering medication?  No    Hypertension Follow-up      Do you check your blood pressure regularly outside of the clinic? Yes     Are you following a low salt diet? Yes    Are your blood pressures ever more than 140 on the top number (systolic) OR more   than 90 on the bottom number (diastolic), for example 140/90? Yes - had a couple but runs usually lower. 120/68 about an hour ago            Video Start Time: 4:13 PM        Review of Systems   Constitutional, HEENT, cardiovascular, pulmonary, gi and gu systems are negative, except as otherwise noted.      Objective           Vitals:  No vitals were obtained today due to virtual visit.    Physical Exam     GENERAL: Healthy, alert and no distress  EYES: Eyes grossly normal to inspection.  No discharge or erythema, or obvious scleral/conjunctival abnormalities.  RESP: No audible wheeze, cough, or visible cyanosis.  No visible retractions or increased work of breathing.    SKIN: Visible skin clear. No significant rash, abnormal pigmentation or lesions.  NEURO: Cranial nerves grossly intact.  Mentation and speech appropriate for age.  PSYCH: Mentation  appears normal, affect normal/bright, judgement and insight intact, normal speech and appearance well-groomed.      Admission on 08/19/2020, Discharged on 08/19/2020   Component Date Value Ref Range Status     Creatinine 08/19/2020 1.06  0.66 - 1.25 mg/dL Final     GFR Estimate 08/19/2020 69  >60 mL/min/[1.73_m2] Final    Comment: Non  GFR Calc  Starting 12/18/2018, serum creatinine based estimated GFR (eGFR) will be   calculated using the Chronic Kidney Disease Epidemiology Collaboration   (CKD-EPI) equation.       GFR Estimate If Black 08/19/2020 80  >60 mL/min/[1.73_m2] Final    Comment:  GFR Calc  Starting 12/18/2018, serum creatinine based estimated GFR (eGFR) will be   calculated using the Chronic Kidney Disease Epidemiology Collaboration   (CKD-EPI) equation.       Potassium 08/19/2020 3.7  3.4 - 5.3 mmol/L Final     Glucose 08/19/2020 114* 70 - 99 mg/dL Final     Glucose 08/19/2020 299* 70 - 99 mg/dL Final           Assessment & Plan     Type 2 diabetes mellitus without complication, without long-term current use of insulin (H)  A1C was above 8 when last checked in July.  I think the problem is with lifestyle rather than with medication as he's on metformin 500 mg BID (couldn't tolerate higher dose due to diarrhea), sulfonylurea and DPP-4 inhibitor.  Discussed changing DPP-4 inhibitor to GLP-1 agonist, but will first focus on lifestyle with diabetes education referral as Nik is a little apprehensive to do an injectable medication.  Recheck A1C in 6-10 weeks.  Change Januvia to once weekly injectable GLP-1 agonist if not at goal when this is rechecked in 6-10 weeks.  - AMBULATORY ADULT DIABETES EDUCATOR REFERRAL; Future  - **A1C FUTURE anytime; Future        Regular exercise  See Patient Instructions    Return in about 3 months (around 1/21/2021) for Diabetes Recheck.    Tj Verma Jr, MD  [facility] [place]      Video-Visit Details    Type of service:  Video  Visit    Video End Time:4:37 PM    Originating Location (pt. Location): Home    Distant Location (provider location):  [facility] [place]     Platform used for Video Visit: Peter

## 2020-10-30 PROBLEM — J44.9 COPD (CHRONIC OBSTRUCTIVE PULMONARY DISEASE) (H): Status: RESOLVED | Noted: 2020-10-30 | Resolved: 2020-10-30

## 2020-10-30 PROBLEM — J44.9 COPD (CHRONIC OBSTRUCTIVE PULMONARY DISEASE) (H): Status: ACTIVE | Noted: 2020-10-30

## 2020-11-09 DIAGNOSIS — E11.9 TYPE 2 DIABETES MELLITUS WITHOUT COMPLICATION, WITHOUT LONG-TERM CURRENT USE OF INSULIN (H): ICD-10-CM

## 2020-11-09 LAB — HBA1C MFR BLD: 8.3 % (ref 0–5.6)

## 2020-11-09 PROCEDURE — 83036 HEMOGLOBIN GLYCOSYLATED A1C: CPT | Performed by: FAMILY MEDICINE

## 2020-11-09 PROCEDURE — 36415 COLL VENOUS BLD VENIPUNCTURE: CPT | Performed by: FAMILY MEDICINE

## 2020-11-11 NOTE — RESULT ENCOUNTER NOTE
Mr. De La Vega,    -A1C test (average blood sugar the last 2-3 months) is above your goal. Keep working on the carbohydrate control (rice, potatoes, fruit, pasta, wine)  ADVISE: making a diabetic lab followup appointment in 4 weeks. If your A1C is still above 8 at that point, we'll change your medication to a once per week injectable medication in place of the Januvia.    If you have further questions about the interpretation of your labs, [email or web address].org is a good website to check out for further information.    Please contact the clinic if you have additional questions.  Thank you.    Sincerely,    Tj Verma MD

## 2020-11-16 ENCOUNTER — OFFICE VISIT (OUTPATIENT)
Dept: NEUROSURGERY | Facility: CLINIC | Age: 75
End: 2020-11-16
Attending: NEUROLOGICAL SURGERY
Payer: COMMERCIAL

## 2020-11-16 VITALS
SYSTOLIC BLOOD PRESSURE: 140 MMHG | OXYGEN SATURATION: 94 % | HEIGHT: 71 IN | DIASTOLIC BLOOD PRESSURE: 75 MMHG | TEMPERATURE: 98.3 F | HEART RATE: 103 BPM | BODY MASS INDEX: 26.26 KG/M2 | WEIGHT: 187.6 LBS

## 2020-11-16 DIAGNOSIS — M48.061 SPINAL STENOSIS OF LUMBAR REGION WITH RADICULOPATHY: Primary | ICD-10-CM

## 2020-11-16 DIAGNOSIS — M54.16 SPINAL STENOSIS OF LUMBAR REGION WITH RADICULOPATHY: Primary | ICD-10-CM

## 2020-11-16 PROCEDURE — 99024 POSTOP FOLLOW-UP VISIT: CPT | Performed by: NEUROLOGICAL SURGERY

## 2020-11-16 PROCEDURE — G0463 HOSPITAL OUTPT CLINIC VISIT: HCPCS

## 2020-11-16 ASSESSMENT — MIFFLIN-ST. JEOR: SCORE: 1618.08

## 2020-11-16 ASSESSMENT — PAIN SCALES - GENERAL: PAINLEVEL: MILD PAIN (2)

## 2020-11-16 NOTE — PROGRESS NOTES
"It was a pleasure to see Jaylen De La Vega today in Neurosurgery Clinic. He is a 74 year old male who underwent:    Procedure Date: 08/19/2020      PREOPERATIVE DIAGNOSES:  Lumbar stenosis with radiculopathy.      POSTOPERATIVE DIAGNOSES:  Lumbar stenosis with radiculopathy.      PROCEDURES PERFORMED:  Left L4-5 minimally invasive decompression and left L5-S1 minimally invasive microdiskectomy.      SURGEON:  Girma Solis MD      ASSISTANT:  Jonathan Gurrola PA-C      ANESTHESIA:  General endotracheal anesthesia plus local anesthetic.      ESTIMATED BLOOD LOSS:  25 mL     Overall he is doing reasonably well.  Initially he had spasms in pain but these overall have improved.  And he seems happy with the results of the surgery.  He is walking again a couple of miles a day.  He does have some activity related axial back pain particularly when bending forward.      Vitals:    11/16/20 1026   BP: (!) 140/75   BP Location: Right arm   Patient Position: Sitting   Cuff Size: Adult Regular   Pulse: 103   Temp: 98.3  F (36.8  C)   TempSrc: Oral   SpO2: 94%   Weight: 85.1 kg (187 lb 9.6 oz)   Height: 1.803 m (5' 11\")     Body mass index is 26.16 kg/m .  Mild Pain (2)    Well-healed lumbar incision  Bilateral lower extremity strength 5 out of 5 in all muscle groups.    Imaging: No new imaging.    Assessment: Status post lumbar decompression.  Doing well.    Plan: At this point the patient may continue activity as tolerated if he wishes to be seen by physical therapy I would be happy to refer him in the future but he is going to stick with the exercises he is previously done in the past.     "

## 2020-11-16 NOTE — LETTER
"    11/16/2020         RE: Jaylen De La Vega  [address]  [place] [address]        Dear Colleague,    Thank you for referring your patient, Jaylen De La Vega, to the [facility] NEUROSURGERY CLINIC [place]. Please see a copy of my visit note below.    It was a pleasure to see Jaylen De La Vega today in Neurosurgery Clinic. He is a 74 year old male who underwent:    Procedure Date: 08/19/2020      PREOPERATIVE DIAGNOSES:  Lumbar stenosis with radiculopathy.      POSTOPERATIVE DIAGNOSES:  Lumbar stenosis with radiculopathy.      PROCEDURES PERFORMED:  Left L4-5 minimally invasive decompression and left L5-S1 minimally invasive microdiskectomy.      SURGEON:  Girma Solis MD      ASSISTANT:  Jonathan Gurrola PA-C      ANESTHESIA:  General endotracheal anesthesia plus local anesthetic.      ESTIMATED BLOOD LOSS:  25 mL     Overall he is doing reasonably well.  Initially he had spasms in pain but these overall have improved.  And he seems happy with the results of the surgery.  He is walking again a couple of miles a day.  He does have some activity related axial back pain particularly when bending forward.      Vitals:    11/16/20 1026   BP: (!) 140/75   BP Location: Right arm   Patient Position: Sitting   Cuff Size: Adult Regular   Pulse: 103   Temp: 98.3  F (36.8  C)   TempSrc: Oral   SpO2: 94%   Weight: 85.1 kg (187 lb 9.6 oz)   Height: 1.803 m (5' 11\")     Body mass index is 26.16 kg/m .  Mild Pain (2)    Well-healed lumbar incision  Bilateral lower extremity strength 5 out of 5 in all muscle groups.    Imaging: No new imaging.    Assessment: Status post lumbar decompression.  Doing well.    Plan: At this point the patient may continue activity as tolerated if he wishes to be seen by physical therapy I would be happy to refer him in the future but he is going to stick with the exercises he is previously done in the past.         Again, thank you for allowing me to participate in the care " of your patient.        Sincerely,        Girma Solis MD

## 2020-11-16 NOTE — NURSING NOTE
"Jaylen De La Vega is a 74 year old male who presents for:  Chief Complaint   Patient presents with     Neurologic Problem     12 week post op Left l4-L5 min invasive decompression and left L5-S1 Microdisectomy.         Initial Vitals:  BP (!) 140/75 (BP Location: Right arm, Patient Position: Sitting, Cuff Size: Adult Regular)   Pulse 103   Temp 98.3  F (36.8  C) (Oral)   Ht 5' 11\" (1.803 m)   Wt 187 lb 9.6 oz (85.1 kg)   SpO2 94%   BMI 26.16 kg/m   Estimated body mass index is 26.16 kg/m  as calculated from the following:    Height as of this encounter: 5' 11\" (1.803 m).    Weight as of this encounter: 187 lb 9.6 oz (85.1 kg).. Body surface area is 2.06 meters squared. BP completed using cuff size: regular  Mild Pain (2)    Nursing Comments: radiates from back and into left leg. Getting some muscle spasms. Down to above the knee, comes and goes.     Robel Sanders, CMA    "

## 2020-11-18 ENCOUNTER — ALLIED HEALTH/NURSE VISIT (OUTPATIENT)
Dept: EDUCATION SERVICES | Facility: CLINIC | Age: 75
End: 2020-11-18
Payer: COMMERCIAL

## 2020-11-18 DIAGNOSIS — E11.9 TYPE 2 DIABETES MELLITUS WITHOUT COMPLICATION, WITHOUT LONG-TERM CURRENT USE OF INSULIN (H): Primary | ICD-10-CM

## 2020-11-18 PROCEDURE — 95250 CONT GLUC MNTR PHYS/QHP EQP: CPT

## 2020-11-18 PROCEDURE — 99207 PR DROP WITH A PROCEDURE: CPT

## 2020-11-18 NOTE — LETTER
11/18/2020         RE: Jaylen De La Vega  [address]  [place] [address]        Dear Colleague,    Thank you for referring your patient, aJylen De La Vega, to the [facility]. Please see a copy of my visit note below.    Diabetes Self-Management Education & Support  Professional Continuous Glucose Monitor Insertion    SUBJECTIVE/OBJECTIVE:     Jaylen De La Vega presents for professional Continuous Glucose Monitor Insertion.     Patient comments/concerns: A1c is creeping up    Lab Results:  Lab Results   Component Value Date    A1C 8.3 11/09/2020      Lab Results   Component Value Date     06/12/2020       Medication:  Diabetes Medication(s)     Biguanides       metFORMIN (GLUCOPHAGE-XR) 500 MG 24 hr tablet    Take 1 tablet (500 mg) by mouth 2 times daily (with meals)    Dipeptidyl Peptidase-4 (DPP-4) Inhibitors       sitagliptin (JANUVIA) 25 MG tablet    Take 1 tablet (25 mg) by mouth daily    Sulfonylureas       glimepiride (AMARYL) 4 MG tablet    Take 1 tablet (4 mg) by mouth every morning (before breakfast)          ASSESSMENT:    CGM study indicated for: Difficult to manage hypoglycemia and/or hyperglycemia, Unexplained fluctuations in glucose values     INTERVENTION:   Sensor started today.     Sensor Type: LibrePro  Lot #: [id number]  Serial #: [id number]  Expiration Date: 03/31/21  Diabetes management related comments/concerns: a1c is creeping up    Sensor was inserted with no resistance or bleeding at insertion site.      Pt will plan to wear the sensor through 11/25/2020.    WRITTEN AND VERBAL INFORMATION GIVEN TO SUPPORT UNDERSTANDING OF:  LibrePro CGM: Sensor insertion, intention of monitoring for 14 days. Keep records of BG, food intake, exercise, and medication dosing during wear.       Patient verbalizes understanding of how to remove sensor, if needed, and all instructions provided.     Educational and other materials:  Food/exercise/medication log  sheets  Contact information    PLAN:  Pt was given instructions for tracking BG, medications, food intake and activity.  Patient to return all items associated with the professional Continuous Glucose Monitor System.  See Patient Instructions, AVS printed and provided to patient today.    Follow-up:    Follow up on 11/25/2020 at 9am.    Nayana Jimenez RN, [facility]    Time spent in DSMT: 0 minutes   Time spent in CGM insertion: 15 minutes, in addition to time spent in DSMT  Encounter Type: Individual

## 2020-11-18 NOTE — PATIENT INSTRUCTIONS
1. Plan to wear the LibrePro sensor for 14 days. It is okay to shower, bathe, and swim (up to 3 feet deep for 30 minutes)    2. Continue with your usual diabetes care plan - check blood sugars and take medicines, as prescribed.    3. Keep a log of what you eat and drink, when you take your medications and how much you take, and exercise you do while you are wearing the sensor.    3. Do not cover the sensor with extra adhesive (the small hole in the center of the sensor must remain uncovered)    4. Use a little extra care, especially when getting dressed or exercising, to avoid accidentally loosening or removing the sensor.     5. Remove the sensor if you need to have an MRI or CT scan.     If the LibrePro sensor comes off early, place it in a plastic bag or envelope and call your diabetes educator or bring it with you to your follow-up visit.     Follow-up appointment: 11/25/2020 at 9am    [facility] the [facility]:  For Your Diabetes Education and Nutrition Appointments Call:  [phone]   For Diabetes Education or Nutrition Related Questions:   Phone: [phone]  Send Compufirst Message   If you need a medication refill please contact your pharmacy. Please allow 3 business days for your refills to be completed.

## 2020-11-18 NOTE — PROGRESS NOTES
Diabetes Self-Management Education & Support  Professional Continuous Glucose Monitor Insertion    SUBJECTIVE/OBJECTIVE:     Jaylen De La Vega presents for professional Continuous Glucose Monitor Insertion.     Patient comments/concerns: A1c is creeping up    Lab Results:  Lab Results   Component Value Date    A1C 8.3 11/09/2020      Lab Results   Component Value Date     06/12/2020       Medication:  Diabetes Medication(s)     Biguanides       metFORMIN (GLUCOPHAGE-XR) 500 MG 24 hr tablet    Take 1 tablet (500 mg) by mouth 2 times daily (with meals)    Dipeptidyl Peptidase-4 (DPP-4) Inhibitors       sitagliptin (JANUVIA) 25 MG tablet    Take 1 tablet (25 mg) by mouth daily    Sulfonylureas       glimepiride (AMARYL) 4 MG tablet    Take 1 tablet (4 mg) by mouth every morning (before breakfast)          ASSESSMENT:    CGM study indicated for: Difficult to manage hypoglycemia and/or hyperglycemia, Unexplained fluctuations in glucose values     INTERVENTION:   Sensor started today.     Sensor Type: LibrePro  Lot #: [id number]  Serial #: [id number]  Expiration Date: 03/31/21  Diabetes management related comments/concerns: a1c is creeping up    Sensor was inserted with no resistance or bleeding at insertion site.      Pt will plan to wear the sensor through 11/25/2020.    WRITTEN AND VERBAL INFORMATION GIVEN TO SUPPORT UNDERSTANDING OF:  LibrePro CGM: Sensor insertion, intention of monitoring for 14 days. Keep records of BG, food intake, exercise, and medication dosing during wear.       Patient verbalizes understanding of how to remove sensor, if needed, and all instructions provided.     Educational and other materials:  Food/exercise/medication log sheets  Contact information    PLAN:  Pt was given instructions for tracking BG, medications, food intake and activity.  Patient to return all items associated with the professional Continuous Glucose Monitor System.  See Patient Instructions, AVS printed and provided to  patient today.    Follow-up:    Follow up on 11/25/2020 at 9am.    Nayana Jimenez RN, [facility]    Time spent in DSMT: 0 minutes   Time spent in CGM insertion: 15 minutes, in addition to time spent in DSMT  Encounter Type: Individual

## 2020-11-25 ENCOUNTER — ALLIED HEALTH/NURSE VISIT (OUTPATIENT)
Dept: EDUCATION SERVICES | Facility: CLINIC | Age: 75
End: 2020-11-25
Payer: COMMERCIAL

## 2020-11-25 DIAGNOSIS — E11.9 TYPE 2 DIABETES MELLITUS WITHOUT COMPLICATION, WITHOUT LONG-TERM CURRENT USE OF INSULIN (H): Primary | ICD-10-CM

## 2020-11-25 PROCEDURE — G0108 DIAB MANAGE TRN  PER INDIV: HCPCS

## 2020-11-25 PROCEDURE — 99207 PR DROP WITH A PROCEDURE: CPT

## 2020-11-25 NOTE — LETTER
"    11/25/2020         RE: Jaylen De La Vega  [address]  [place] [address]        Dear Colleague,    Thank you for referring your patient, Jaylen De La Vega, to the [facility]. Please see a copy of my visit note below.    Diabetes Self-Management Education & Support    Presents for: CGM Review    SUBJECTIVE/OBJECTIVE:  Presents for: CGM Review  Accompanied by: Self  Diabetes education in the past 24mo: Yes  Focus of Visit: CGM  Type of CGM visit: Professional CGM  Diabetes type: Type 2  Disease course: Getting harder to manage  Diabetes management related comments/concerns: a1c is creeping up  Transportation concerns: No  Difficulty affording diabetes medication?: No  Difficulty affording diabetes testing supplies?: No  Other concerns:: None  Cultural Influences/Ethnic Background:  American      Diabetes Symptoms & Complications:  Fatigue: No  Neuropathy: Yes  Polydipsia: No  Polyphagia: No  Polyuria: Sometimes  Visual change: No  Autonomic neuropathy: No  CVA: No  Heart disease: No  Nephropathy: No  Peripheral neuropathy: Yes  Peripheral Vascular Disease: No    Patient Problem List and Family Medical History reviewed for relevant medical history, current medical status, and diabetes risk factors.    Vitals:  There were no vitals taken for this visit.  Estimated body mass index is 26.16 kg/m  as calculated from the following:    Height as of 11/16/20: 1.803 m (5' 11\").    Weight as of 11/16/20: 85.1 kg (187 lb 9.6 oz).   Last 3 BP:   BP Readings from Last 3 Encounters:   11/16/20 (!) 140/75   10/21/20 120/68   10/05/20 (!) 145/68       History   Smoking Status     Former Smoker     Packs/day: 0.50     Years: 50.00     Types: Cigarettes     Start date: 6/14/1962     Quit date: 1/1/2012   Smokeless Tobacco     Never Used       Labs:  Lab Results   Component Value Date    A1C 8.3 11/09/2020     Lab Results   Component Value Date     06/12/2020     Lab Results   Component Value " Date    LDL 76 06/12/2020     HDL Cholesterol   Date Value Ref Range Status   06/12/2020 39 (L) >39 mg/dL Final   ]  GFR Estimate   Date Value Ref Range Status   08/19/2020 69 >60 mL/min/[1.73_m2] Final     Comment:     Non  GFR Calc  Starting 12/18/2018, serum creatinine based estimated GFR (eGFR) will be   calculated using the Chronic Kidney Disease Epidemiology Collaboration   (CKD-EPI) equation.       GFR Estimate If Black   Date Value Ref Range Status   08/19/2020 80 >60 mL/min/[1.73_m2] Final     Comment:      GFR Calc  Starting 12/18/2018, serum creatinine based estimated GFR (eGFR) will be   calculated using the Chronic Kidney Disease Epidemiology Collaboration   (CKD-EPI) equation.       Lab Results   Component Value Date    CR 1.06 08/19/2020     No results found for: MICROALBUMIN    Healthy Eating:  Healthy Eating Assessed Today: Yes  Cultural/Sabianist diet restrictions?: No  Meal planning/habits: Avoiding sweets, Low carb  Meals include: Breakfast, Lunch, Dinner, Morning Snack, Afternoon Snack, Evening Snack  Breakfast: bowl of cereal with milk Or oatmeal with brown sugar, and cranberries OR english muffin with PB and major - 2 cups of coffee in the morning  Lunch: salad Or 3 pot stickers and 1 egg roll Or ham and cheese sandwich or oatmeal with brown sugar and cranberries  Dinner: 6 pot stickers and 2 egg rolls OR salad and chicken Or wild rice soup OR 1/2 sandwich and salad  Snacks: yogurt, banan, apple with peanut butter, pumpkin bread, chips and salsa, popcorn  Beverages: Water, Coffee, Alcohol(1 glass of wine in the evening)    Being Active:  Being Active Assessed Today: Yes  Exercise:: Yes  Days per week of moderate to strenuous exercise (like a brisk walk): 7  On average, minutes per day of exercise at this level: 30  How intense was your typical exercise? : Light (like stretching or slow walking)(variety of exercise- walking 2-3 miles OR lifting wieghts OR  climbing stairs)  Exercise Minutes per Week: 210  Barrier to exercise: Other(recent back surgery)    Monitoring:  Blood Glucose Meter: One Touch  Times checking blood sugar at home (number): 3  Times checking blood sugar at home (per): Day  Blood glucose trend: Fluctuating(having low blood sugars in the afternoon and evening)        Taking Medications:  Diabetes Medication(s)     Biguanides       metFORMIN (GLUCOPHAGE-XR) 500 MG 24 hr tablet    Take 1 tablet (500 mg) by mouth 2 times daily (with meals)    Dipeptidyl Peptidase-4 (DPP-4) Inhibitors       sitagliptin (JANUVIA) 25 MG tablet    Take 1 tablet (25 mg) by mouth daily    Sulfonylureas       glimepiride (AMARYL) 4 MG tablet    Take 1 tablet (4 mg) by mouth every morning (before breakfast)          Taking Medication Assessed Today: Yes  Current Treatments: Oral Medication (taken by mouth)  Problems taking diabetes medications regularly?: No  Diabetes medication side effects?: No    Problem Solving:  Problem Solving Assessed Today: Yes  Is the patient at risk for hypoglycemia?: Yes  Hypoglycemia Frequency: Other(couple times a week)  Hypoglycemia Treatment: Other food, Candy  Medical ID: No  Is the patient at risk for DKA?: No    Hypoglycemia symptoms  Confusion: No  Dizziness or Light-Headedness: Yes  Headaches: No  Hunger: No  Mood changes: No  Nervousness/Anxiety: No  Sleepiness: No  Speech difficulty: No  Sweats: No  Feeling shaky: Yes    Hypoglycemia Complications  Blackouts: No  Hospitalization: No  Nocturnal hypoglycemia: No  Required assistance: No  Required glucagon injection: No  Seizures: No    Reducing Risks:  Reducing Risks Assessed Today: Yes  Diabetes Risks: Age over 45 years  CAD Risks: Family history, Male sex, Diabetes Mellitus  Has dilated eye exam at least once a year?: Yes  Sees dentist every 6 months?: Yes  Feet checked by healthcare provider in the last year?: Yes    Healthy Coping:  Healthy Coping Assessed Today: Yes  Emotional response  to diabetes: Ready to learn  Informal Support system:: Children, Family, Friends, Neighbors  Stage of change: PREPARATION (Decided to change - considering how)  Support resources: In-person Offerings  Patient Activation Measure Survey Score:  No flowsheet data found.    Diabetes knowledge and skills assessment:   Patient is knowledgeable in diabetes management concepts related to: Being Active, Monitoring and Healthy Coping    Patient needs further education on the following diabetes management concepts: Healthy Eating, Taking Medication and Problem Solving    Based on learning assessment above, most appropriate setting for further diabetes education would be: Individual setting.      INTERVENTIONS:    REPORTS:                Education provided today on:  AADE Self-Care Behaviors:  Diabetes Pathophysiology  Healthy Eating: consistency in amount, composition, and timing of food intake, portion control and plate planning method  Being Active: relationship to blood glucose  Monitoring: individual blood glucose targets and frequency of monitoring  Taking Medication: action of prescribed medication, side effects of prescribed medications, when to take medications and dosing  Problem Solving: low blood glucose - causes, signs/symptoms, treatment and prevention and carrying a carbohydrate source at all times  Healthy Coping: benefits of making appropriate lifestyle changes    Pt verbalized understanding of concepts discussed and recommendations provided today.       Education Materials Provided:  My Plate Planner and healthy meal and snack ideas    ASSESSMENT:    Review of Libreview report. Glucose overall average 149mg/dl,  in target 69%, above target 24% and below target 7% of the time. Pattern of post breakfast hyperglycemia likely due to the carbohydrate portion. He is experiencing afternoon and evening hypoglycemia.   Patient would benefit from watching portions at meals and a change to medication.  He currently takes  4mg of glimepiride in the morning, he may benefit from splitting the dose to reduce evening hypoglycemia and may allow better coverage for morning glucose.     Changes made to medication plan: glimepiride 4mg-0-0-0, change to -->2mg-0-2mg-0        PLAN  See Patient Instructions for co-developed, patient-stated behavior change goals.  AVS printed and provided to patient today. See Follow-Up section for recommended follow-up.    Nayana Jmienez RN, [facility]    Time Spent: 45 minutes  Encounter Type: Individual    Any diabetes medication dose changes were made via the CDE Protocol and Collaborative Practice Agreement with the patient's referring provider. A copy of this encounter was shared with the provider.

## 2020-11-25 NOTE — PROGRESS NOTES
"Diabetes Self-Management Education & Support    Presents for: CGM Review    SUBJECTIVE/OBJECTIVE:  Presents for: CGM Review  Accompanied by: Self  Diabetes education in the past 24mo: Yes  Focus of Visit: CGM  Type of CGM visit: Professional CGM  Diabetes type: Type 2  Disease course: Getting harder to manage  Diabetes management related comments/concerns: a1c is creeping up  Transportation concerns: No  Difficulty affording diabetes medication?: No  Difficulty affording diabetes testing supplies?: No  Other concerns:: None  Cultural Influences/Ethnic Background:  American      Diabetes Symptoms & Complications:  Fatigue: No  Neuropathy: Yes  Polydipsia: No  Polyphagia: No  Polyuria: Sometimes  Visual change: No  Autonomic neuropathy: No  CVA: No  Heart disease: No  Nephropathy: No  Peripheral neuropathy: Yes  Peripheral Vascular Disease: No    Patient Problem List and Family Medical History reviewed for relevant medical history, current medical status, and diabetes risk factors.    Vitals:  There were no vitals taken for this visit.  Estimated body mass index is 26.16 kg/m  as calculated from the following:    Height as of 11/16/20: 1.803 m (5' 11\").    Weight as of 11/16/20: 85.1 kg (187 lb 9.6 oz).   Last 3 BP:   BP Readings from Last 3 Encounters:   11/16/20 (!) 140/75   10/21/20 120/68   10/05/20 (!) 145/68       History   Smoking Status     Former Smoker     Packs/day: 0.50     Years: 50.00     Types: Cigarettes     Start date: 6/14/1962     Quit date: 1/1/2012   Smokeless Tobacco     Never Used       Labs:  Lab Results   Component Value Date    A1C 8.3 11/09/2020     Lab Results   Component Value Date     06/12/2020     Lab Results   Component Value Date    LDL 76 06/12/2020     HDL Cholesterol   Date Value Ref Range Status   06/12/2020 39 (L) >39 mg/dL Final   ]  GFR Estimate   Date Value Ref Range Status   08/19/2020 69 >60 mL/min/[1.73_m2] Final     Comment:     Non  GFR " Calc  Starting 12/18/2018, serum creatinine based estimated GFR (eGFR) will be   calculated using the Chronic Kidney Disease Epidemiology Collaboration   (CKD-EPI) equation.       GFR Estimate If Black   Date Value Ref Range Status   08/19/2020 80 >60 mL/min/[1.73_m2] Final     Comment:      GFR Calc  Starting 12/18/2018, serum creatinine based estimated GFR (eGFR) will be   calculated using the Chronic Kidney Disease Epidemiology Collaboration   (CKD-EPI) equation.       Lab Results   Component Value Date    CR 1.06 08/19/2020     No results found for: MICROALBUMIN    Healthy Eating:  Healthy Eating Assessed Today: Yes  Cultural/Muslim diet restrictions?: No  Meal planning/habits: Avoiding sweets, Low carb  Meals include: Breakfast, Lunch, Dinner, Morning Snack, Afternoon Snack, Evening Snack  Breakfast: bowl of cereal with milk Or oatmeal with brown sugar, and cranberries OR english muffin with PB and major - 2 cups of coffee in the morning  Lunch: salad Or 3 pot stickers and 1 egg roll Or ham and cheese sandwich or oatmeal with brown sugar and cranberries  Dinner: 6 pot stickers and 2 egg rolls OR salad and chicken Or wild rice soup OR 1/2 sandwich and salad  Snacks: yogurt, banan, apple with peanut butter, pumpkin bread, chips and salsa, popcorn  Beverages: Water, Coffee, Alcohol(1 glass of wine in the evening)    Being Active:  Being Active Assessed Today: Yes  Exercise:: Yes  Days per week of moderate to strenuous exercise (like a brisk walk): 7  On average, minutes per day of exercise at this level: 30  How intense was your typical exercise? : Light (like stretching or slow walking)(variety of exercise- walking 2-3 miles OR lifting wieghts OR climbing stairs)  Exercise Minutes per Week: 210  Barrier to exercise: Other(recent back surgery)    Monitoring:  Blood Glucose Meter: One Touch  Times checking blood sugar at home (number): 3  Times checking blood sugar at home (per): Day  Blood  glucose trend: Fluctuating(having low blood sugars in the afternoon and evening)        Taking Medications:  Diabetes Medication(s)     Biguanides       metFORMIN (GLUCOPHAGE-XR) 500 MG 24 hr tablet    Take 1 tablet (500 mg) by mouth 2 times daily (with meals)    Dipeptidyl Peptidase-4 (DPP-4) Inhibitors       sitagliptin (JANUVIA) 25 MG tablet    Take 1 tablet (25 mg) by mouth daily    Sulfonylureas       glimepiride (AMARYL) 4 MG tablet    Take 1 tablet (4 mg) by mouth every morning (before breakfast)          Taking Medication Assessed Today: Yes  Current Treatments: Oral Medication (taken by mouth)  Problems taking diabetes medications regularly?: No  Diabetes medication side effects?: No    Problem Solving:  Problem Solving Assessed Today: Yes  Is the patient at risk for hypoglycemia?: Yes  Hypoglycemia Frequency: Other(couple times a week)  Hypoglycemia Treatment: Other food, Candy  Medical ID: No  Is the patient at risk for DKA?: No    Hypoglycemia symptoms  Confusion: No  Dizziness or Light-Headedness: Yes  Headaches: No  Hunger: No  Mood changes: No  Nervousness/Anxiety: No  Sleepiness: No  Speech difficulty: No  Sweats: No  Feeling shaky: Yes    Hypoglycemia Complications  Blackouts: No  Hospitalization: No  Nocturnal hypoglycemia: No  Required assistance: No  Required glucagon injection: No  Seizures: No    Reducing Risks:  Reducing Risks Assessed Today: Yes  Diabetes Risks: Age over 45 years  CAD Risks: Family history, Male sex, Diabetes Mellitus  Has dilated eye exam at least once a year?: Yes  Sees dentist every 6 months?: Yes  Feet checked by healthcare provider in the last year?: Yes    Healthy Coping:  Healthy Coping Assessed Today: Yes  Emotional response to diabetes: Ready to learn  Informal Support system:: Children, Family, Friends, Neighbors  Stage of change: PREPARATION (Decided to change - considering how)  Support resources: In-person Offerings  Patient Activation Measure Survey Score:  No  flowsheet data found.    Diabetes knowledge and skills assessment:   Patient is knowledgeable in diabetes management concepts related to: Being Active, Monitoring and Healthy Coping    Patient needs further education on the following diabetes management concepts: Healthy Eating, Taking Medication and Problem Solving    Based on learning assessment above, most appropriate setting for further diabetes education would be: Individual setting.      INTERVENTIONS:    REPORTS:                Education provided today on:  AADE Self-Care Behaviors:  Diabetes Pathophysiology  Healthy Eating: consistency in amount, composition, and timing of food intake, portion control and plate planning method  Being Active: relationship to blood glucose  Monitoring: individual blood glucose targets and frequency of monitoring  Taking Medication: action of prescribed medication, side effects of prescribed medications, when to take medications and dosing  Problem Solving: low blood glucose - causes, signs/symptoms, treatment and prevention and carrying a carbohydrate source at all times  Healthy Coping: benefits of making appropriate lifestyle changes    Pt verbalized understanding of concepts discussed and recommendations provided today.       Education Materials Provided:  My Plate Planner and healthy meal and snack ideas    ASSESSMENT:    Review of [employer] report. Glucose overall average 149mg/dl,  in target 69%, above target 24% and below target 7% of the time. Pattern of post breakfast hyperglycemia likely due to the carbohydrate portion. He is experiencing afternoon and evening hypoglycemia.   Patient would benefit from watching portions at meals and a change to medication.  He currently takes 4mg of glimepiride in the morning, he may benefit from splitting the dose to reduce evening hypoglycemia and may allow better coverage for morning glucose.     Changes made to medication plan: glimepiride 4mg-0-0-0, change to -->2mg-0-2mg-0         PLAN  See Patient Instructions for co-developed, patient-stated behavior change goals.  AVS printed and provided to patient today. See Follow-Up section for recommended follow-up.    Nayana Jimenez RN, [facility]    Time Spent: 45 minutes  Encounter Type: Individual    Any diabetes medication dose changes were made via the CDE Protocol and Collaborative Practice Agreement with the patient's referring provider. A copy of this encounter was shared with the provider.

## 2020-11-25 NOTE — PATIENT INSTRUCTIONS
Care Plan:  Meal Plan Recommendation: eat 3 meals a day, have small snacks between meals, if needed, use portion control   Reduce your portion of cereal and try adding in some protein with your breakfast.   Refer to the the plate planner picture  Refer to the meal and snack lists    Exercise / activity plan: continue with your activity  Check blood sugars before meals and bedtime    Medication Change: Glimepiride take 1/2 tablet with breakfast and 1/2 tablet with dinner.  Continue all other medication.     Keep glucose tablets by your bedside and in the car -  Take 3-4 glucose tablets for a low blood sugar.     Follow up:  Tuesday December 1st at 2:30pm     Bring blood glucose meter and logbook with you to all doctor and follow-up appointments.     [facility] for the [facility]:  For Your Diabetes or Nutrition Education Appointments Call:  [phone]   For Diabetes or Nutrition Related Questions:   [phone]  Send IP Street Message   If you need a medication refill please contact your pharmacy. Please allow 3 business days for your refills to be completed.

## 2020-12-01 ENCOUNTER — ALLIED HEALTH/NURSE VISIT (OUTPATIENT)
Dept: EDUCATION SERVICES | Facility: CLINIC | Age: 75
End: 2020-12-01
Payer: COMMERCIAL

## 2020-12-01 DIAGNOSIS — E11.9 TYPE 2 DIABETES MELLITUS WITHOUT COMPLICATION, WITHOUT LONG-TERM CURRENT USE OF INSULIN (H): Primary | ICD-10-CM

## 2020-12-01 PROCEDURE — 99207 PR DROP WITH A PROCEDURE: CPT

## 2020-12-01 PROCEDURE — G0108 DIAB MANAGE TRN  PER INDIV: HCPCS

## 2020-12-01 NOTE — LETTER
"    12/1/2020         RE: Jaylen De La Vega  [address]  [place] [address]        Dear Colleague,    Thank you for referring your patient, Jaylen De La Vega, to the [facility]. Please see a copy of my visit note below.    Diabetes Self-Management Education & Support    Presents for: CGM Review    SUBJECTIVE/OBJECTIVE:  Presents for: CGM Review  Accompanied by: Self  Diabetes education in the past 24mo: Yes  Focus of Visit: CGM  Type of CGM visit: Professional CGM  Diabetes type: Type 2  Disease course: Getting harder to manage  Transportation concerns: No  Difficulty affording diabetes medication?: No  Difficulty affording diabetes testing supplies?: No  Other concerns:: None  Cultural Influences/Ethnic Background:  American    Diabetes Symptoms & Complications:  Fatigue: No  Neuropathy: Yes  Polydipsia: No  Polyphagia: No  Polyuria: Sometimes  Visual change: No  Complications assessed today?: Yes  Autonomic neuropathy: No  CVA: No  Heart disease: No  Nephropathy: No  Peripheral neuropathy: Yes  Peripheral Vascular Disease: No  Retinopathy: No  Sexual dysfunction: No    Patient Problem List and Family Medical History reviewed for relevant medical history, current medical status, and diabetes risk factors.    Vitals:  There were no vitals taken for this visit.  Estimated body mass index is 26.16 kg/m  as calculated from the following:    Height as of 11/16/20: 1.803 m (5' 11\").    Weight as of 11/16/20: 85.1 kg (187 lb 9.6 oz).   Last 3 BP:   BP Readings from Last 3 Encounters:   11/16/20 (!) 140/75   10/21/20 120/68   10/05/20 (!) 145/68       History   Smoking Status     Former Smoker     Packs/day: 0.50     Years: 50.00     Types: Cigarettes     Start date: 6/14/1962     Quit date: 1/1/2012   Smokeless Tobacco     Never Used       Labs:  Lab Results   Component Value Date    A1C 8.3 11/09/2020     Lab Results   Component Value Date     06/12/2020     Lab Results   Component " Value Date    LDL 76 06/12/2020     HDL Cholesterol   Date Value Ref Range Status   06/12/2020 39 (L) >39 mg/dL Final   ]  GFR Estimate   Date Value Ref Range Status   08/19/2020 69 >60 mL/min/[1.73_m2] Final     Comment:     Non  GFR Calc  Starting 12/18/2018, serum creatinine based estimated GFR (eGFR) will be   calculated using the Chronic Kidney Disease Epidemiology Collaboration   (CKD-EPI) equation.       GFR Estimate If Black   Date Value Ref Range Status   08/19/2020 80 >60 mL/min/[1.73_m2] Final     Comment:      GFR Calc  Starting 12/18/2018, serum creatinine based estimated GFR (eGFR) will be   calculated using the Chronic Kidney Disease Epidemiology Collaboration   (CKD-EPI) equation.       Lab Results   Component Value Date    CR 1.06 08/19/2020     No results found for: MICROALBUMIN    Healthy Eating:  Healthy Eating Assessed Today: Yes  Cultural/Pentecostalism diet restrictions?: No  Meal planning/habits: Avoiding sweets, Low carb  Meals include: Breakfast, Lunch, Dinner, Morning Snack, Afternoon Snack, Evening Snack  Breakfast: bowl of cereal with milk and fruit Or breakfast sandwich OR english muffin with PB and major - 2 cups of coffee in the morning  Lunch: chicken fajitas Or hot dog and ccottage cheese Or ham and cheese sandwich or ribs, remington slaw and stuffing  Dinner: ribs, stuffing OR spaghetti Or grilled ham and cheese and cottage cheese  Snacks: yogurt, fruit, pumpkin bar, PB crackers  Beverages: Water, Coffee, Alcohol(1 glass of wine in the evening)    Being Active:  Being Active Assessed Today: Yes  Exercise:: Yes  Days per week of moderate to strenuous exercise (like a brisk walk): 7  On average, minutes per day of exercise at this level: 30  How intense was your typical exercise? : Light (like stretching or slow walking)(variety of exercise- walking 2-3 miles OR lifting wieghts OR climbing stairs)  Exercise Minutes per Week: 210  Barrier to exercise:  Other(recent back surgery)    Monitoring:  Monitoring Assessed Today: Yes  Did patient bring glucose meter to appointment? : No  Blood Glucose Meter: One Touch  Times checking blood sugar at home (number): 3  Times checking blood sugar at home (per): Day  Blood glucose trend: Fluctuating(having low blood sugars in the afternoon and evening)    Taking Medications:  Diabetes Medication(s)     Biguanides       metFORMIN (GLUCOPHAGE-XR) 500 MG 24 hr tablet    Take 1 tablet (500 mg) by mouth 2 times daily (with meals)    Dipeptidyl Peptidase-4 (DPP-4) Inhibitors       sitagliptin (JANUVIA) 25 MG tablet    Take 1 tablet (25 mg) by mouth daily    Sulfonylureas       glimepiride (AMARYL) 4 MG tablet    Take 1 tablet (4 mg) by mouth every morning (before breakfast)          Taking Medication Assessed Today: Yes  Current Treatments: Oral Medication (taken by mouth)  Problems taking diabetes medications regularly?: No  Diabetes medication side effects?: No    Problem Solving:  Problem Solving Assessed Today: Yes  Is the patient at risk for hypoglycemia?: Yes  Hypoglycemia Frequency: Other(couple times a week)  Hypoglycemia Treatment: Other food, Candy  Medical ID: No  Is the patient at risk for DKA?: No    Hypoglycemia symptoms  Confusion: No  Dizziness or Light-Headedness: Yes  Headaches: No  Hunger: No  Mood changes: No  Nervousness/Anxiety: No  Sleepiness: No  Speech difficulty: No  Sweats: No  Feeling shaky: Yes    Hypoglycemia Complications  Blackouts: No  Hospitalization: No  Nocturnal hypoglycemia: No  Required assistance: No  Required glucagon injection: No  Seizures: No    Reducing Risks:  Reducing Risks Assessed Today: Yes  Diabetes Risks: Age over 45 years  CAD Risks: Family history, Male sex, Diabetes Mellitus  Has dilated eye exam at least once a year?: Yes  Sees dentist every 6 months?: Yes  Feet checked by healthcare provider in the last year?: Yes    Healthy Coping:  Healthy Coping Assessed Today:  Yes  Emotional response to diabetes: Ready to learn  Informal Support system:: Children, Family, Friends, Neighbors  Stage of change: PREPARATION (Decided to change - considering how)  Support resources: In-person Offerings  Patient Activation Measure Survey Score:  No flowsheet data found.    Diabetes knowledge and skills assessment:   Patient is knowledgeable in diabetes management concepts related to: Healthy Eating, Being Active, Monitoring, Taking Medication, Problem Solving, Reducing Risks and Healthy Coping    Patient needs further education on the following diabetes management concepts: Healthy Eating and Taking Medication    Based on learning assessment above, most appropriate setting for further diabetes education would be: Individual setting.      INTERVENTIONS:    REPORTS:                      Education provided today on:  AADE Self-Care Behaviors:  Healthy Eating: consistency in amount, composition, and timing of food intake, portion control and plate planning method  Taking Medication: action of prescribed medication, when to take medications and dosing    Pt verbalized understanding of concepts discussed and recommendations provided today.       Education Materials Provided:  No new materials provided today    ASSESSMENT:  Review of [facility] report. Glucose overall average 156mg/dl,  in target 69%, above target 27% and below target 4% of the time. pattern of post meal hyperglycemia. He is no longer experiencing afternoon or evening hypoglycemia with split dose of glimepiride. He also self adjusted his metformin and stopped the noon dose but did continue with the dinner dose.    Changes to medication improved hypoglycemia but he does appear to have some immanuel phenomenon in the early morning hours. He would benefit from adding back in the 2nd metformin tablet and recommend he take it at dinner.     He may be able to improve the post meal glucose rise with some dietary changes but he likely needs an  adjustment to his medication plan.  He states he has been on Januvia for years, it may no longer be effective for him. May want to consider using a GLP1 such as Ozempic and stop the DPP-4.   He states he still has a couple of months of the Januvia left and would prefer to finish his current prescription and wait until after the first of the year to make a change in his medications.      Review with patient the benefit of watching portions at meals.   Medication Plan:  Glimepiride - continue with 1/2 tablet with breakfast and 1/2 tablet with dinner.  Januvia - continue 1 tablet daily  Metformin- do a trial of 2 tablets with dinner.  If you have GI symptoms then reduce back to 1 tablet daily    In January - consider stopping the Januvia and starting Ozempic.    PLAN  See Patient Instructions for co-developed, patient-stated behavior change goals.  AVS printed and provided to patient today. See Follow-Up section for recommended follow-up.    Nayana Jimenez RN, [facility]    Time Spent: 30 minutes  Encounter Type: Individual    Any diabetes medication dose changes were made via the CDE Protocol and Collaborative Practice Agreement with the patient's referring provider. A copy of this encounter was shared with the provider.

## 2020-12-01 NOTE — PROGRESS NOTES
"Diabetes Self-Management Education & Support    Presents for: CGM Review    SUBJECTIVE/OBJECTIVE:  Presents for: CGM Review  Accompanied by: Self  Diabetes education in the past 24mo: Yes  Focus of Visit: CGM  Type of CGM visit: Professional CGM  Diabetes type: Type 2  Disease course: Getting harder to manage  Transportation concerns: No  Difficulty affording diabetes medication?: No  Difficulty affording diabetes testing supplies?: No  Other concerns:: None  Cultural Influences/Ethnic Background:  American    Diabetes Symptoms & Complications:  Fatigue: No  Neuropathy: Yes  Polydipsia: No  Polyphagia: No  Polyuria: Sometimes  Visual change: No  Complications assessed today?: Yes  Autonomic neuropathy: No  CVA: No  Heart disease: No  Nephropathy: No  Peripheral neuropathy: Yes  Peripheral Vascular Disease: No  Retinopathy: No  Sexual dysfunction: No    Patient Problem List and Family Medical History reviewed for relevant medical history, current medical status, and diabetes risk factors.    Vitals:  There were no vitals taken for this visit.  Estimated body mass index is 26.16 kg/m  as calculated from the following:    Height as of 11/16/20: 1.803 m (5' 11\").    Weight as of 11/16/20: 85.1 kg (187 lb 9.6 oz).   Last 3 BP:   BP Readings from Last 3 Encounters:   11/16/20 (!) 140/75   10/21/20 120/68   10/05/20 (!) 145/68       History   Smoking Status     Former Smoker     Packs/day: 0.50     Years: 50.00     Types: Cigarettes     Start date: 6/14/1962     Quit date: 1/1/2012   Smokeless Tobacco     Never Used       Labs:  Lab Results   Component Value Date    A1C 8.3 11/09/2020     Lab Results   Component Value Date     06/12/2020     Lab Results   Component Value Date    LDL 76 06/12/2020     HDL Cholesterol   Date Value Ref Range Status   06/12/2020 39 (L) >39 mg/dL Final   ]  GFR Estimate   Date Value Ref Range Status   08/19/2020 69 >60 mL/min/[1.73_m2] Final     Comment:     Non  GFR " Calc  Starting 12/18/2018, serum creatinine based estimated GFR (eGFR) will be   calculated using the Chronic Kidney Disease Epidemiology Collaboration   (CKD-EPI) equation.       GFR Estimate If Black   Date Value Ref Range Status   08/19/2020 80 >60 mL/min/[1.73_m2] Final     Comment:      GFR Calc  Starting 12/18/2018, serum creatinine based estimated GFR (eGFR) will be   calculated using the Chronic Kidney Disease Epidemiology Collaboration   (CKD-EPI) equation.       Lab Results   Component Value Date    CR 1.06 08/19/2020     No results found for: MICROALBUMIN    Healthy Eating:  Healthy Eating Assessed Today: Yes  Cultural/Confucianism diet restrictions?: No  Meal planning/habits: Avoiding sweets, Low carb  Meals include: Breakfast, Lunch, Dinner, Morning Snack, Afternoon Snack, Evening Snack  Breakfast: bowl of cereal with milk and fruit Or breakfast sandwich OR english muffin with PB and major - 2 cups of coffee in the morning  Lunch: chicken fajitas Or hot dog and ccottage cheese Or ham and cheese sandwich or ribs, remington slaw and stuffing  Dinner: ribs, stuffing OR spaghetti Or grilled ham and cheese and cottage cheese  Snacks: yogurt, fruit, pumpkin bar, PB crackers  Beverages: Water, Coffee, Alcohol(1 glass of wine in the evening)    Being Active:  Being Active Assessed Today: Yes  Exercise:: Yes  Days per week of moderate to strenuous exercise (like a brisk walk): 7  On average, minutes per day of exercise at this level: 30  How intense was your typical exercise? : Light (like stretching or slow walking)(variety of exercise- walking 2-3 miles OR lifting wieghts OR climbing stairs)  Exercise Minutes per Week: 210  Barrier to exercise: Other(recent back surgery)    Monitoring:  Monitoring Assessed Today: Yes  Did patient bring glucose meter to appointment? : No  Blood Glucose Meter: One Touch  Times checking blood sugar at home (number): 3  Times checking blood sugar at home (per): Day  Blood  glucose trend: Fluctuating(having low blood sugars in the afternoon and evening)    Taking Medications:  Diabetes Medication(s)     Biguanides       metFORMIN (GLUCOPHAGE-XR) 500 MG 24 hr tablet    Take 1 tablet (500 mg) by mouth 2 times daily (with meals)    Dipeptidyl Peptidase-4 (DPP-4) Inhibitors       sitagliptin (JANUVIA) 25 MG tablet    Take 1 tablet (25 mg) by mouth daily    Sulfonylureas       glimepiride (AMARYL) 4 MG tablet    Take 1 tablet (4 mg) by mouth every morning (before breakfast)          Taking Medication Assessed Today: Yes  Current Treatments: Oral Medication (taken by mouth)  Problems taking diabetes medications regularly?: No  Diabetes medication side effects?: No    Problem Solving:  Problem Solving Assessed Today: Yes  Is the patient at risk for hypoglycemia?: Yes  Hypoglycemia Frequency: Other(couple times a week)  Hypoglycemia Treatment: Other food, Candy  Medical ID: No  Is the patient at risk for DKA?: No    Hypoglycemia symptoms  Confusion: No  Dizziness or Light-Headedness: Yes  Headaches: No  Hunger: No  Mood changes: No  Nervousness/Anxiety: No  Sleepiness: No  Speech difficulty: No  Sweats: No  Feeling shaky: Yes    Hypoglycemia Complications  Blackouts: No  Hospitalization: No  Nocturnal hypoglycemia: No  Required assistance: No  Required glucagon injection: No  Seizures: No    Reducing Risks:  Reducing Risks Assessed Today: Yes  Diabetes Risks: Age over 45 years  CAD Risks: Family history, Male sex, Diabetes Mellitus  Has dilated eye exam at least once a year?: Yes  Sees dentist every 6 months?: Yes  Feet checked by healthcare provider in the last year?: Yes    Healthy Coping:  Healthy Coping Assessed Today: Yes  Emotional response to diabetes: Ready to learn  Informal Support system:: Children, Family, Friends, Neighbors  Stage of change: PREPARATION (Decided to change - considering how)  Support resources: In-person Offerings  Patient Activation Measure Survey Score:  No  flowsheet data found.    Diabetes knowledge and skills assessment:   Patient is knowledgeable in diabetes management concepts related to: Healthy Eating, Being Active, Monitoring, Taking Medication, Problem Solving, Reducing Risks and Healthy Coping    Patient needs further education on the following diabetes management concepts: Healthy Eating and Taking Medication    Based on learning assessment above, most appropriate setting for further diabetes education would be: Individual setting.      INTERVENTIONS:    REPORTS:                      Education provided today on:  AADE Self-Care Behaviors:  Healthy Eating: consistency in amount, composition, and timing of food intake, portion control and plate planning method  Taking Medication: action of prescribed medication, when to take medications and dosing    Pt verbalized understanding of concepts discussed and recommendations provided today.       Education Materials Provided:  No new materials provided today    ASSESSMENT:  Review of [place] report. Glucose overall average 156mg/dl,  in target 69%, above target 27% and below target 4% of the time. pattern of post meal hyperglycemia. He is no longer experiencing afternoon or evening hypoglycemia with split dose of glimepiride. He also self adjusted his metformin and stopped the noon dose but did continue with the dinner dose.    Changes to medication improved hypoglycemia but he does appear to have some immanuel phenomenon in the early morning hours. He would benefit from adding back in the 2nd metformin tablet and recommend he take it at dinner.     He may be able to improve the post meal glucose rise with some dietary changes but he likely needs an adjustment to his medication plan.  He states he has been on Januvia for years, it may no longer be effective for him. May want to consider using a GLP1 such as Ozempic and stop the DPP-4.   He states he still has a couple of months of the Januvia left and would prefer to  finish his current prescription and wait until after the first of the year to make a change in his medications.      Review with patient the benefit of watching portions at meals.   Medication Plan:  Glimepiride - continue with 1/2 tablet with breakfast and 1/2 tablet with dinner.  Januvia - continue 1 tablet daily  Metformin- do a trial of 2 tablets with dinner.  If you have GI symptoms then reduce back to 1 tablet daily    In January - consider stopping the Januvia and starting Ozempic.    PLAN  See Patient Instructions for co-developed, patient-stated behavior change goals.  AVS printed and provided to patient today. See Follow-Up section for recommended follow-up.    Nayana Jimenez RN, [facility]    Time Spent: 30 minutes  Encounter Type: Individual    Any diabetes medication dose changes were made via the CDE Protocol and Collaborative Practice Agreement with the patient's referring provider. A copy of this encounter was shared with the provider.

## 2020-12-01 NOTE — PATIENT INSTRUCTIONS
Care Plan:  Meal Plan Recommendation: eat 3 meals a day, have small snacks between meals, if needed, use portion control  -try saving your breakfast fruit for a snack    Refer to the the plate planner picture  Refer to the meal and snack lists     Exercise / activity plan: continue with your activity  Check blood sugars before meals and bedtime     Medication Plan:  Glimepiride - continue with 1/2 tablet with breakfast and 1/2 tablet with dinner.  Januvia - continue 1 tablet daily  Metformin- do a trial of 2 tablets with dinner.  If you have GI symptoms then reduce back to 1 tablet daily     Keep glucose tablets by your bedside and in the car -  Take 3-4 glucose tablets for a low blood sugar.      Follow up:  In January - At that time we will consider stopping the Januvia and starting Ozempic.        Bring blood glucose meter and logbook with you to all doctor and follow-up appointments.

## 2021-01-26 ENCOUNTER — TELEPHONE (OUTPATIENT)
Dept: EDUCATION SERVICES | Facility: CLINIC | Age: 76
End: 2021-01-26

## 2021-01-26 ENCOUNTER — ALLIED HEALTH/NURSE VISIT (OUTPATIENT)
Dept: EDUCATION SERVICES | Facility: CLINIC | Age: 76
End: 2021-01-26
Payer: COMMERCIAL

## 2021-01-26 DIAGNOSIS — E11.9 TYPE 2 DIABETES MELLITUS WITHOUT COMPLICATION, WITHOUT LONG-TERM CURRENT USE OF INSULIN (H): Primary | ICD-10-CM

## 2021-01-26 PROCEDURE — G0108 DIAB MANAGE TRN  PER INDIV: HCPCS

## 2021-01-26 PROCEDURE — 99207 PR DROP WITH A PROCEDURE: CPT

## 2021-01-26 NOTE — LETTER
"    1/26/2021         RE: Jaylen De La Vega  [address]  [place] [address]        Dear Colleague,    Thank you for referring your patient, Jaylen De La Vega, to the [facility]. Please see a copy of my visit note below.    Diabetes Self-Management Education & Support    Presents for: Individual review    SUBJECTIVE/OBJECTIVE:  Presents for: Individual review  Accompanied by: Self  Diabetes education in the past 24mo: Yes  Focus of Visit: Taking Medication  Diabetes type: Type 2  Disease course: Other  How confident are you filling out medical forms by yourself:: Quite a bit  Diabetes management related comments/concerns: ran out of Januvia, wondering about cheaper medication plan.  test strips from pharmacy are not working in Arkray meter, would like a Segment sensor for personal use  Transportation concerns: No  Difficulty affording diabetes medication?: No  Difficulty affording diabetes testing supplies?: No  Other concerns:: None  Cultural Influences/Ethnic Background:  American    Diabetes Symptoms & Complications:  Fatigue: No  Neuropathy: Yes  Polydipsia: No  Polyphagia: No  Polyuria: Sometimes  Visual change: No  Complications assessed today?: No  Autonomic neuropathy: No  CVA: No  Heart disease: No  Nephropathy: No  Peripheral neuropathy: Yes  Peripheral Vascular Disease: No  Retinopathy: No  Sexual dysfunction: No    Patient Problem List and Family Medical History reviewed for relevant medical history, current medical status, and diabetes risk factors.    Vitals:  There were no vitals taken for this visit.  Estimated body mass index is 26.16 kg/m  as calculated from the following:    Height as of 11/16/20: 1.803 m (5' 11\").    Weight as of 11/16/20: 85.1 kg (187 lb 9.6 oz).   Last 3 BP:   BP Readings from Last 3 Encounters:   11/16/20 (!) 140/75   10/21/20 120/68   10/05/20 (!) 145/68       History   Smoking Status     Former Smoker     Packs/day: 0.50     Years: 50.00     " Types: Cigarettes     Start date: 6/14/1962     Quit date: 1/1/2012   Smokeless Tobacco     Never Used       Labs:  Lab Results   Component Value Date    A1C 8.3 11/09/2020     Lab Results   Component Value Date     06/12/2020     Lab Results   Component Value Date    LDL 76 06/12/2020     HDL Cholesterol   Date Value Ref Range Status   06/12/2020 39 (L) >39 mg/dL Final   ]  GFR Estimate   Date Value Ref Range Status   08/19/2020 69 >60 mL/min/[1.73_m2] Final     Comment:     Non  GFR Calc  Starting 12/18/2018, serum creatinine based estimated GFR (eGFR) will be   calculated using the Chronic Kidney Disease Epidemiology Collaboration   (CKD-EPI) equation.       GFR Estimate If Black   Date Value Ref Range Status   08/19/2020 80 >60 mL/min/[1.73_m2] Final     Comment:      GFR Calc  Starting 12/18/2018, serum creatinine based estimated GFR (eGFR) will be   calculated using the Chronic Kidney Disease Epidemiology Collaboration   (CKD-EPI) equation.       Lab Results   Component Value Date    CR 1.06 08/19/2020     No results found for: MICROALBUMIN    Healthy Eating:  Healthy Eating Assessed Today: Yes  Cultural/Yarsani diet restrictions?: No  Meal planning/habits: Avoiding sweets, Low carb(not drinking wine or snacking as much in the evening)  Meals include: Breakfast, Lunch, Dinner, Morning Snack, Afternoon Snack, Evening Snack  Breakfast: bowl of cereal with milk Or breakfast sandwich OR english muffin with PB and major - 2 cups of coffee in the morning  Lunch: chicken fajitas Or hot dog and ccottage cheese Or ham and cheese sandwich or ribs, remington slaw and stuffing  Dinner: ribs, stuffing OR spaghetti Or grilled ham and cheese and cottage cheese  Snacks: yogurt, fruit, pumpkin bar, PB crackers  Beverages: Water, Coffee, Alcohol(1 glass of wine in the evening)    Being Active:  Being Active Assessed Today: Yes  Exercise:: Yes  Days per week of moderate to strenuous exercise  (like a brisk walk): 7  On average, minutes per day of exercise at this level: 60  How intense was your typical exercise? : Light (like stretching or slow walking)(variety of exercise- walking 2-3 miles OR lifting wieghts OR climbing stairs)  Exercise Minutes per Week: 420  Barrier to exercise: Other(recent back surgery)    Monitoring:  Monitoring Assessed Today: Yes  Did patient bring glucose meter to appointment? : Yes  Blood Glucose Meter: One Touch, Glucocard  Times checking blood sugar at home (number): 3  Times checking blood sugar at home (per): Day  Blood glucose trend: Fluctuating(having low blood sugars in the afternoon and evening)        BG's from Arkray meter  Date FBG/ 2hours post Lunch/2hours post Dinner /2hours post    1/26 - /261      1/12 142 167 95    1/11   76    1/10 Ran out of strips      1/9 Ran out of strips      1/8 152      1/6 149      1/5  163     1/4 111  110    1/3   126    1/2 172  154    1/1   146          Taking Medications:  Diabetes Medication(s)     Biguanides       metFORMIN (GLUCOPHAGE-XR) 500 MG 24 hr tablet    Take 1 tablet (500 mg) by mouth 2 times daily (with meals)    Dipeptidyl Peptidase-4 (DPP-4) Inhibitors       sitagliptin (JANUVIA) 25 MG tablet    Take 1 tablet (25 mg) by mouth daily    Sulfonylureas       glimepiride (AMARYL) 4 MG tablet    Take 1 tablet (4 mg) by mouth every morning (before breakfast)          Taking Medication Assessed Today: Yes  Current Treatments: Oral Medication (taken by mouth)(ran out of Januvia on 1/10/21)  Problems taking diabetes medications regularly?: No  Diabetes medication side effects?: No    Problem Solving:  Problem Solving Assessed Today: Yes  Is the patient at risk for hypoglycemia?: Yes  Hypoglycemia Frequency: Rarely  Hypoglycemia Treatment: Other food, Candy, Glucose (tablets or gel)  Is the patient at risk for DKA?: No    Hypoglycemia symptoms  Confusion: No  Dizziness or Light-Headedness: Yes  Headaches: No  Hunger: No  Mood  changes: No  Nervousness/Anxiety: No  Sleepiness: No  Speech difficulty: No  Sweats: No  Feeling shaky: Yes    Hypoglycemia Complications  Blackouts: No  Hospitalization: No  Nocturnal hypoglycemia: No  Required assistance: No  Required glucagon injection: No  Seizures: No    Reducing Risks:  Reducing Risks Assessed Today: No  Diabetes Risks: Age over 45 years  CAD Risks: Family history, Male sex, Diabetes Mellitus  Has dilated eye exam at least once a year?: Yes  Sees dentist every 6 months?: Yes  Feet checked by healthcare provider in the last year?: Yes    Healthy Coping:  Healthy Coping Assessed Today: Yes  Emotional response to diabetes: Ready to learn  Informal Support system:: Children, Family, Friends, Neighbors  Stage of change: ACTION (Actively working towards change)  Support resources: In-person Offerings  Patient Activation Measure Survey Score:  No flowsheet data found.    Diabetes knowledge and skills assessment:   Patient is knowledgeable in diabetes management concepts related to: Being Active, Monitoring, Reducing Risks and Healthy Coping    Patient needs further education on the following diabetes management concepts: Monitoring and Taking Medication    Based on learning assessment above, most appropriate setting for further diabetes education would be: Individual setting.      INTERVENTIONS:  Sensor was inserted with no resistance or bleeding at insertion site.    Education provided today on:  AADE Self-Care Behaviors:  Diabetes Pathophysiology  Healthy Eating: consistency in amount, composition, and timing of food intake and portion control  Being Active: relationship to blood glucose  Monitoring: log and interpret results, individual blood glucose targets and frequency of monitoring  Taking Medication: action of prescribed medication    CGM-specific education:   Freestyle Edna sensor: insertion technique, sensor site location and rotation, insulin administration in relation to sensor  placement, sensor wear, reasons to remove sensor (MRI, CT, diathermy), Vitamin C & Aspirin effects on sensor, Edna [place]: frequency of scanning sensor, length of time data is visible, use of built in glucose meter, Precision X-tra test strips, Use of trends and graphs for pattern management and problem solving and [employer] software         ASSESSMENT:  Patient ran out of eJamming on 1/10, he is looking for an alternative plan due to cost and he did not think the MeetCastuvia is working anymore. He does have a new insurance plan this year and is not sure on cost or coverage of the MeetCastuvia. We also discussed the use of GLP1 as an alternative for improved glucose control. He will check into coverage with his insurance.    Patient also interested in person a CGM- Edna 2. He has experienced hypoglycemia and would benefit from a personal CGM. He is willing to pay out of pocket if not covered by his insurance.  Provided patient with Edna 2 starter kit today with reader.    Consider increase to glimepiride as it is an affordable medication. Glucose not in goal but at this time it may be best to monitor glucose with CGM for a couple of days before increasing glimepiride.     Pt verbalized understanding of concepts discussed and recommendations provided today.    PLAN  See Patient Instructions for co-developed, patient-stated behavior change goals.  AVS printed and provided to patient today. See Follow-Up section for recommended follow-up.    Nayana Jimenez RN, [facility]   Time Spent: 60 minutes  Encounter Type: Individual    Any diabetes medication dose changes were made via the CDE Protocol and Collaborative Practice Agreement with the patient's referring provider. A copy of this encounter was shared with the provider.

## 2021-01-26 NOTE — PROGRESS NOTES
"Diabetes Self-Management Education & Support    Presents for: Individual review    SUBJECTIVE/OBJECTIVE:  Presents for: Individual review  Accompanied by: Self  Diabetes education in the past 24mo: Yes  Focus of Visit: Taking Medication  Diabetes type: Type 2  Disease course: Other  How confident are you filling out medical forms by yourself:: Quite a bit  Diabetes management related comments/concerns: ran out of Januvia, wondering about cheaper medication plan.  test strips from pharmacy are not working in Arkray meter, would like a Edna sensor for personal use  Transportation concerns: No  Difficulty affording diabetes medication?: No  Difficulty affording diabetes testing supplies?: No  Other concerns:: None  Cultural Influences/Ethnic Background:  American    Diabetes Symptoms & Complications:  Fatigue: No  Neuropathy: Yes  Polydipsia: No  Polyphagia: No  Polyuria: Sometimes  Visual change: No  Complications assessed today?: No  Autonomic neuropathy: No  CVA: No  Heart disease: No  Nephropathy: No  Peripheral neuropathy: Yes  Peripheral Vascular Disease: No  Retinopathy: No  Sexual dysfunction: No    Patient Problem List and Family Medical History reviewed for relevant medical history, current medical status, and diabetes risk factors.    Vitals:  There were no vitals taken for this visit.  Estimated body mass index is 26.16 kg/m  as calculated from the following:    Height as of 11/16/20: 1.803 m (5' 11\").    Weight as of 11/16/20: 85.1 kg (187 lb 9.6 oz).   Last 3 BP:   BP Readings from Last 3 Encounters:   11/16/20 (!) 140/75   10/21/20 120/68   10/05/20 (!) 145/68       History   Smoking Status     Former Smoker     Packs/day: 0.50     Years: 50.00     Types: Cigarettes     Start date: 6/14/1962     Quit date: 1/1/2012   Smokeless Tobacco     Never Used       Labs:  Lab Results   Component Value Date    A1C 8.3 11/09/2020     Lab Results   Component Value Date     06/12/2020     Lab Results "   Component Value Date    LDL 76 06/12/2020     HDL Cholesterol   Date Value Ref Range Status   06/12/2020 39 (L) >39 mg/dL Final   ]  GFR Estimate   Date Value Ref Range Status   08/19/2020 69 >60 mL/min/[1.73_m2] Final     Comment:     Non  GFR Calc  Starting 12/18/2018, serum creatinine based estimated GFR (eGFR) will be   calculated using the Chronic Kidney Disease Epidemiology Collaboration   (CKD-EPI) equation.       GFR Estimate If Black   Date Value Ref Range Status   08/19/2020 80 >60 mL/min/[1.73_m2] Final     Comment:      GFR Calc  Starting 12/18/2018, serum creatinine based estimated GFR (eGFR) will be   calculated using the Chronic Kidney Disease Epidemiology Collaboration   (CKD-EPI) equation.       Lab Results   Component Value Date    CR 1.06 08/19/2020     No results found for: MICROALBUMIN    Healthy Eating:  Healthy Eating Assessed Today: Yes  Cultural/Protestant diet restrictions?: No  Meal planning/habits: Avoiding sweets, Low carb(not drinking wine or snacking as much in the evening)  Meals include: Breakfast, Lunch, Dinner, Morning Snack, Afternoon Snack, Evening Snack  Breakfast: bowl of cereal with milk Or breakfast sandwich OR english muffin with PB and major - 2 cups of coffee in the morning  Lunch: chicken fajitas Or hot dog and ccottage cheese Or ham and cheese sandwich or ribs, remington slaw and stuffing  Dinner: ribs, stuffing OR spaghetti Or grilled ham and cheese and cottage cheese  Snacks: yogurt, fruit, pumpkin bar, PB crackers  Beverages: Water, Coffee, Alcohol(1 glass of wine in the evening)    Being Active:  Being Active Assessed Today: Yes  Exercise:: Yes  Days per week of moderate to strenuous exercise (like a brisk walk): 7  On average, minutes per day of exercise at this level: 60  How intense was your typical exercise? : Light (like stretching or slow walking)(variety of exercise- walking 2-3 miles OR lifting wieghts OR climbing stairs)  Exercise  Minutes per Week: 420  Barrier to exercise: Other(recent back surgery)    Monitoring:  Monitoring Assessed Today: Yes  Did patient bring glucose meter to appointment? : Yes  Blood Glucose Meter: One Touch, Glucocard  Times checking blood sugar at home (number): 3  Times checking blood sugar at home (per): Day  Blood glucose trend: Fluctuating(having low blood sugars in the afternoon and evening)        BG's from Arkray meter  Date FBG/ 2hours post Lunch/2hours post Dinner /2hours post    1/26 - /261      1/12 142 167 95    1/11   76    1/10 Ran out of strips      1/9 Ran out of strips      1/8 152      1/6 149      1/5  163     1/4 111  110    1/3   126    1/2 172  154    1/1   146          Taking Medications:  Diabetes Medication(s)     Biguanides       metFORMIN (GLUCOPHAGE-XR) 500 MG 24 hr tablet    Take 1 tablet (500 mg) by mouth 2 times daily (with meals)    Dipeptidyl Peptidase-4 (DPP-4) Inhibitors       sitagliptin (JANUVIA) 25 MG tablet    Take 1 tablet (25 mg) by mouth daily    Sulfonylureas       glimepiride (AMARYL) 4 MG tablet    Take 1 tablet (4 mg) by mouth every morning (before breakfast)          Taking Medication Assessed Today: Yes  Current Treatments: Oral Medication (taken by mouth)(ran out of Januvia on 1/10/21)  Problems taking diabetes medications regularly?: No  Diabetes medication side effects?: No    Problem Solving:  Problem Solving Assessed Today: Yes  Is the patient at risk for hypoglycemia?: Yes  Hypoglycemia Frequency: Rarely  Hypoglycemia Treatment: Other food, Candy, Glucose (tablets or gel)  Is the patient at risk for DKA?: No    Hypoglycemia symptoms  Confusion: No  Dizziness or Light-Headedness: Yes  Headaches: No  Hunger: No  Mood changes: No  Nervousness/Anxiety: No  Sleepiness: No  Speech difficulty: No  Sweats: No  Feeling shaky: Yes    Hypoglycemia Complications  Blackouts: No  Hospitalization: No  Nocturnal hypoglycemia: No  Required assistance: No  Required glucagon  injection: No  Seizures: No    Reducing Risks:  Reducing Risks Assessed Today: No  Diabetes Risks: Age over 45 years  CAD Risks: Family history, Male sex, Diabetes Mellitus  Has dilated eye exam at least once a year?: Yes  Sees dentist every 6 months?: Yes  Feet checked by healthcare provider in the last year?: Yes    Healthy Coping:  Healthy Coping Assessed Today: Yes  Emotional response to diabetes: Ready to learn  Informal Support system:: Children, Family, Friends, Neighbors  Stage of change: ACTION (Actively working towards change)  Support resources: In-person Offerings  Patient Activation Measure Survey Score:  No flowsheet data found.    Diabetes knowledge and skills assessment:   Patient is knowledgeable in diabetes management concepts related to: Being Active, Monitoring, Reducing Risks and Healthy Coping    Patient needs further education on the following diabetes management concepts: Monitoring and Taking Medication    Based on learning assessment above, most appropriate setting for further diabetes education would be: Individual setting.      INTERVENTIONS:  Sensor was inserted with no resistance or bleeding at insertion site.    Education provided today on:  AADE Self-Care Behaviors:  Diabetes Pathophysiology  Healthy Eating: consistency in amount, composition, and timing of food intake and portion control  Being Active: relationship to blood glucose  Monitoring: log and interpret results, individual blood glucose targets and frequency of monitoring  Taking Medication: action of prescribed medication    CGM-specific education:   Freestyle Edna sensor: insertion technique, sensor site location and rotation, insulin administration in relation to sensor placement, sensor wear, reasons to remove sensor (MRI, CT, diathermy), Vitamin C & Aspirin effects on sensor, Edna [place]: frequency of scanning sensor, length of time data is visible, use of built in glucose meter, Precision X-tra test strips, Use of  trends and graphs for pattern management and problem solving and [employer] software         ASSESSMENT:  Patient ran out of Januvia on 1/10, he is looking for an alternative plan due to cost and he did not think the Januvia is working anymore. He does have a new insurance plan this year and is not sure on cost or coverage of the Januvia. We also discussed the use of GLP1 as an alternative for improved glucose control. He will check into coverage with his insurance.    Patient also interested in person a CGM- Edna 2. He has experienced hypoglycemia and would benefit from a personal CGM. He is willing to pay out of pocket if not covered by his insurance.  Provided patient with Edna 2 starter kit today with reader.    Consider increase to glimepiride as it is an affordable medication. Glucose not in goal but at this time it may be best to monitor glucose with CGM for a couple of days before increasing glimepiride.     Pt verbalized understanding of concepts discussed and recommendations provided today.    PLAN  See Patient Instructions for co-developed, patient-stated behavior change goals.  AVS printed and provided to patient today. See Follow-Up section for recommended follow-up.    Nayana Jimenez RN, [facility]   Time Spent: 60 minutes  Encounter Type: Individual    Any diabetes medication dose changes were made via the CDE Protocol and Collaborative Practice Agreement with the patient's referring provider. A copy of this encounter was shared with the provider.

## 2021-01-26 NOTE — TELEPHONE ENCOUNTER
Please see my notes from today.  Patient would benefit from a personal favio 2 sensor to monitor glucose closely.  He is aware of the cost and the possibility insurance may not cover it. He is willing to pay out of pocket for the sensors. I provided him with a starter kit that includes 1 reader and 1 sensor. He will need sensor prescription sent to his pharmacy.    If you agree please sign the script in  and route back to me or indicate alternate plan.    Nayana Jimenez RN,CDE

## 2021-01-26 NOTE — PATIENT INSTRUCTIONS
Care Plan:  Meal Plan Recommendation: continue to work on healthy eating      Refer to the the plate planner picture  Refer to the meal and snack lists     Exercise / activity plan: continue with your activity     Medication Plan:  Glimepiride - continue with 1/2 tablet with breakfast and 1/2 tablet with dinner. Will consider increasing the dose based on glucose values  Nathanieluvia - Hold for now - due to cost  Metformin- Continue 2 tablets with dinner.      Edna 2 Instructions:  1. The first hour after you place the sensor is the warm up period (black out period).  You will need to carry your blood glucose meter and check blood glucose during this time.    2. Check blood sugar if feeling symptoms of hypoglycemia but meter is not displaying a low number- may be some variability between sensor and meter at times.    3. Change sensor every 14 days.    4. Read/scan blood sugars at least every 8 hours to ensure entirety of data is available.     5. Watch trend arrows- will let you know if blood sugars are rising, falling or stable at the time you check.    6. It is okay to shower, bathe, and swim (up to 3 feet deep for 30 minutes) with sensor. Do not get reader wet.    7. Remove the sensor if you need to have an MRI or CT scan.    8. Do not cover the sensor with extra adhesive (the small hole  in the center of the sensor must remain uncovered).  If you have trouble with sensor falling off, these are approved products to help with sensor adhesion: Torbot Skin Tac, SKIN-PREP Protective Barrier Wipe and Mastisol Liquid Adhesive    Software:    set up [employer] account at  [email or web address]    [facility] the [facility]:  For Your Diabetes Education and Nutrition Appointments Call:  [phone]   For Diabetes Education or Nutrition Related Questions:   Phone: [phone]  Send [employer] Message   If you need a medication refill please contact your pharmacy. Please allow 3  business days for your refills to be completed.

## 2021-02-01 DIAGNOSIS — I10 ESSENTIAL HYPERTENSION: ICD-10-CM

## 2021-02-02 ENCOUNTER — MYC MEDICAL ADVICE (OUTPATIENT)
Dept: EDUCATION SERVICES | Facility: CLINIC | Age: 76
End: 2021-02-02

## 2021-02-02 ENCOUNTER — TELEPHONE (OUTPATIENT)
Dept: EDUCATION SERVICES | Facility: CLINIC | Age: 76
End: 2021-02-02

## 2021-02-02 NOTE — TELEPHONE ENCOUNTER
Left VM message and sent MyChart message in response to his call.     Stephie Ruiz RN, [facility]

## 2021-02-03 NOTE — TELEPHONE ENCOUNTER
LOV: 10/21/2020  Patient due for DM check  No future appt scheduled    Routing to [facility] to assist in scheduling      Cara Rader RN  [facility]

## 2021-02-09 ENCOUNTER — ALLIED HEALTH/NURSE VISIT (OUTPATIENT)
Dept: EDUCATION SERVICES | Facility: CLINIC | Age: 76
End: 2021-02-09
Payer: COMMERCIAL

## 2021-02-09 DIAGNOSIS — E11.9 TYPE 2 DIABETES MELLITUS WITHOUT COMPLICATION, WITHOUT LONG-TERM CURRENT USE OF INSULIN (H): Primary | ICD-10-CM

## 2021-02-09 PROCEDURE — 99207 PR NO CHARGE LOS: CPT

## 2021-02-09 NOTE — PROGRESS NOTES
Diabetes Follow-up    Subjective/Objective:    Jaylen ASHRAF De La Vega downloaded or sent in blood glucose report for review.       Diabetes is being managed with   Diabetes Medications   Diabetes Medication(s)     Biguanides       metFORMIN (GLUCOPHAGE-XR) 500 MG 24 hr tablet    Take 2 tablet with dinner    Sulfonylureas       glimepiride (AMARYL) 4 MG tablet    Take 1/2 tablet (2 mg) by mouth before breakfast. 1/2 tablet (2mg) before dinner          Report:              Assessment/Plan:  Limited data, patient scanning sensor 1-2 times daily. Encourage frequent scans, at least every 8 hours. He did not recall previous instructions on using the favio sensor. Reviewed favio instructions and provided printed copy of previous AVS instructions.     Nayana Jimenez RN, [facility]    Time spent:  15 minutes.  No charge visit    Any diabetes medication dose changes were made via the CDE Protocol and Collaborative Practice Agreement with the patient's referring provider. A copy of this encounter was shared with the provider.

## 2021-02-09 NOTE — LETTER
2/9/2021         RE: Jaylen De La Vega  [address]  [place] [address]        Dear Colleague,    Thank you for referring your patient, Jaylen De La Vega, to the [facility]. Please see a copy of my visit note below.    Diabetes Follow-up    Subjective/Objective:    Jaylen De La Vega downloaded or sent in blood glucose report for review.       Diabetes is being managed with   Diabetes Medications   Diabetes Medication(s)     Biguanides       metFORMIN (GLUCOPHAGE-XR) 500 MG 24 hr tablet    Take 2 tablet with dinner    Sulfonylureas       glimepiride (AMARYL) 4 MG tablet    Take 1/2 tablet (2 mg) by mouth before breakfast. 1/2 tablet (2mg) before dinner          Report:              Assessment/Plan:  Limited data, patient scanning sensor 1-2 times daily. Encourage frequent scans, at least every 8 hours. He did not recall previous instructions on using the favio sensor. Reviewed favio instructions and provided printed copy of previous AVS instructions.     Nayana Jimenez RN, [facility] [place]    Time spent:  15 minutes.  No charge visit    Any diabetes medication dose changes were made via the CDE Protocol and Collaborative Practice Agreement with the patient's referring provider. A copy of this encounter was shared with the provider.

## 2021-02-11 NOTE — TELEPHONE ENCOUNTER
Routing refill request to provider for review/approval because:  See below      Cara Rader RN  [facility]

## 2021-02-12 ENCOUNTER — MYC MEDICAL ADVICE (OUTPATIENT)
Dept: FAMILY MEDICINE | Facility: CLINIC | Age: 76
End: 2021-02-12

## 2021-02-12 RX ORDER — LOSARTAN POTASSIUM 100 MG/1
100 TABLET ORAL DAILY
Qty: 90 TABLET | Refills: 1 | Status: SHIPPED | OUTPATIENT
Start: 2021-02-12 | End: 2021-05-26

## 2021-02-12 NOTE — TELEPHONE ENCOUNTER
Chart reviewed.  Rx sent to pt's preferred pharmacy.       [facility] [place]  [id number] [place] [facility] [place] [address] [address]    Tj Verma MD

## 2021-03-11 DIAGNOSIS — I10 ESSENTIAL HYPERTENSION: ICD-10-CM

## 2021-03-12 ENCOUNTER — TELEPHONE (OUTPATIENT)
Dept: FAMILY MEDICINE | Facility: CLINIC | Age: 76
End: 2021-03-12

## 2021-03-12 DIAGNOSIS — N40.1 BENIGN PROSTATIC HYPERPLASIA WITH URINARY FREQUENCY: ICD-10-CM

## 2021-03-12 DIAGNOSIS — E11.42 TYPE 2 DIABETES MELLITUS WITH DIABETIC POLYNEUROPATHY, WITHOUT LONG-TERM CURRENT USE OF INSULIN (H): ICD-10-CM

## 2021-03-12 DIAGNOSIS — R35.0 BENIGN PROSTATIC HYPERPLASIA WITH URINARY FREQUENCY: ICD-10-CM

## 2021-03-12 RX ORDER — GABAPENTIN 100 MG/1
CAPSULE ORAL
Qty: 279 CAPSULE | Refills: 1 | Status: SHIPPED | OUTPATIENT
Start: 2021-03-12 | End: 2021-07-21

## 2021-03-12 RX ORDER — TAMSULOSIN HYDROCHLORIDE 0.4 MG/1
CAPSULE ORAL
Qty: 90 CAPSULE | Refills: 0 | Status: SHIPPED | OUTPATIENT
Start: 2021-03-12 | End: 2021-05-26

## 2021-03-12 RX ORDER — METFORMIN HCL 500 MG
500 TABLET, EXTENDED RELEASE 24 HR ORAL
Qty: 180 TABLET | Refills: 1
Start: 2021-03-12 | End: 2021-05-18

## 2021-03-12 RX ORDER — HYDROCHLOROTHIAZIDE 25 MG/1
25 TABLET ORAL DAILY
Qty: 90 TABLET | Refills: 0 | Status: SHIPPED | OUTPATIENT
Start: 2021-03-12 | End: 2021-05-26

## 2021-03-12 NOTE — TELEPHONE ENCOUNTER
I called Nik for an unrelated non-medical issue and we got to talking about his diabetes.  Still having glycemic control issues and would like to try semaglutide or similar GLP-1 agonist.  I agree with this so will stop his glimepiride and have him  the GLP-1 agonist and make an appointment with his diabetic educator in [place] to have her instruct him on administration.  Is also having neuropathy that is disrupting sleep so will start him on gabapentin.      Chart reviewed.  Rx sent to pt's preferred pharmacy.       [facility] [place]  [id number] [place] [facility] - [place] [place] - [address] [facility] [address]    Tj Verma MD

## 2021-03-12 NOTE — TELEPHONE ENCOUNTER
Due for BP check          Medication is being filled for 1 time refill only due to:  Patient needs to be seen because needs BP check.        Cara Rader RN  [facility]

## 2021-03-12 NOTE — TELEPHONE ENCOUNTER
Due for BP check        Medication is being filled for 1 time refill only due to:  Patient needs to be seen because needs BP check.      Cara Rader RN  [facility]

## 2021-03-15 ENCOUNTER — ALLIED HEALTH/NURSE VISIT (OUTPATIENT)
Dept: NURSING | Facility: CLINIC | Age: 76
End: 2021-03-15
Payer: COMMERCIAL

## 2021-03-15 ENCOUNTER — MYC REFILL (OUTPATIENT)
Dept: FAMILY MEDICINE | Facility: CLINIC | Age: 76
End: 2021-03-15

## 2021-03-15 VITALS
OXYGEN SATURATION: 99 % | BODY MASS INDEX: 25.52 KG/M2 | SYSTOLIC BLOOD PRESSURE: 126 MMHG | WEIGHT: 183 LBS | HEART RATE: 110 BPM | DIASTOLIC BLOOD PRESSURE: 72 MMHG

## 2021-03-15 DIAGNOSIS — E11.9 TYPE 2 DIABETES MELLITUS WITHOUT COMPLICATION, WITHOUT LONG-TERM CURRENT USE OF INSULIN (H): ICD-10-CM

## 2021-03-15 DIAGNOSIS — N40.1 BENIGN PROSTATIC HYPERPLASIA WITH URINARY FREQUENCY: ICD-10-CM

## 2021-03-15 DIAGNOSIS — I10 ESSENTIAL HYPERTENSION: ICD-10-CM

## 2021-03-15 DIAGNOSIS — Z01.30 BLOOD PRESSURE CHECK: ICD-10-CM

## 2021-03-15 DIAGNOSIS — R35.0 BENIGN PROSTATIC HYPERPLASIA WITH URINARY FREQUENCY: ICD-10-CM

## 2021-03-15 DIAGNOSIS — I10 ESSENTIAL HYPERTENSION: Primary | ICD-10-CM

## 2021-03-15 LAB — HBA1C MFR BLD: 8.5 % (ref 0–5.6)

## 2021-03-15 PROCEDURE — 36415 COLL VENOUS BLD VENIPUNCTURE: CPT | Performed by: FAMILY MEDICINE

## 2021-03-15 PROCEDURE — 83036 HEMOGLOBIN GLYCOSYLATED A1C: CPT | Performed by: FAMILY MEDICINE

## 2021-03-15 NOTE — PROGRESS NOTES
Jaylen De La Vega is being followed for Blood Pressure management.    BP Readings from Last 3 Encounters:   03/15/21 126/72   11/16/20 (!) 140/75   10/21/20 120/68       Wt Readings from Last 2 Encounters:   11/16/20 85.1 kg (187 lb 9.6 oz)   10/05/20 84.8 kg (187 lb)       Is pulse 55 or greater? - Yes    Pulse Readings from Last 1 Encounters:   11/16/20 103       Current blood pressure medication(s):  Current Outpatient Medications   Medication Sig Dispense Refill     albuterol (PROAIR HFA/PROVENTIL HFA/VENTOLIN HFA) 108 (90 Base) MCG/ACT inhaler Inhale 2 puffs into the lungs every 6 hours as needed for shortness of breath / dyspnea or wheezing (Patient not taking: Reported on 11/16/2020) 1 Inhaler 3     aspirin 81 MG tablet Take 1 tablet (81 mg) by mouth daily 30 tablet      Continuous Blood Gluc  (Eat LocalSTYLE RENETTA 2 READER SYSTM) JEREMY 1 each every 14 days Use daily with renetta 2 sensors 1 each 0     Continuous Blood Gluc Sensor (FREESTYLE RENETTA 2 SENSOR SYSTM) MISC 1 each every 14 days 2 each 11     gabapentin (NEURONTIN) 100 MG capsule Take 3 capsules (300 mg) by mouth daily for 1 day, THEN 3 capsules (300 mg) 2 times daily for 1 day, THEN 3 capsules (300 mg) 3 times daily. 279 capsule 1     hydrochlorothiazide (HYDRODIURIL) 25 MG tablet Take 1 tablet (25 mg) by mouth daily 90 tablet 0     losartan (COZAAR) 100 MG tablet Take 1 tablet (100 mg) by mouth daily 90 tablet 1     metFORMIN (GLUCOPHAGE-XR) 500 MG 24 hr tablet Take 1 tablet (500 mg) by mouth daily (with dinner) 180 tablet 1     nitroGLYcerin (NITROSTAT) 0.4 MG sublingual tablet Place 1 tablet (0.4 mg) under the tongue every 5 minutes as needed for chest pain 25 tablet 0     ONETOUCH DELICA LANCETS 33G MISC 1 each 2 times daily 100 each 3     ONETOUCH VERIO IQ test strip USE 1 STRIP BY IN VITRO ROUTE 2 TIMES DAILY 100 strip 3     Semaglutide, 1 MG/DOSE, 4 MG/3ML SOPN Inject 0.25 mg Subcutaneous every 7 days 3 mL 1     simvastatin (ZOCOR) 20 MG  tablet Take 1 tablet (20 mg) by mouth At Bedtime at bedtime. 90 tablet 3     tamsulosin (FLOMAX) 0.4 MG capsule TAKE 1 CAPSULE BY MOUTH EVERY DAY 90 capsule 0     umeclidinium (INCRUSE ELLIPTA) 62.5 MCG/INH inhaler Inhale 1 puff into the lungs daily 60 each 5     vitamin B-Complex Take 1 tablet by mouth daily       VITAMIN D OR 1 tablet qd             1. Follow up instructions include:     Per PCP.      SUBJECTIVE:                                                    The patient is taking medication as prescribed and is tolerating well.   Patient is monitoring Blood Pressure at home.   Last 3 home readings 111/67 this morning    Patient noted his pulse has been elevated for the past 1.5 years - cannot donate blood if pulse is over 100.  Routing to PCP for further review/recommendations/orders.  Do you want to discuss beta blocker med?      Out of the following complicating factors: Cough, Headache, Lightheadedness, Shortness of breath, Fatigue, Nausea, Sexual Dysfunction, New onset of swelling or edema, Weakness and New onset of Chest Pain, the patient reports:  None    OBJECTIVE:                                                      Today's BP completed using cuff size: regular on left side  arm.      Potassium   Date Value Ref Range Status   08/19/2020 3.7 3.4 - 5.3 mmol/L Final     Creatinine   Date Value Ref Range Status   08/19/2020 1.06 0.66 - 1.25 mg/dL Final     Urea Nitrogen   Date Value Ref Range Status   06/12/2020 18 7 - 30 mg/dL Final     GFR Estimate   Date Value Ref Range Status   08/19/2020 69 >60 mL/min/[1.73_m2] Final     Comment:     Non  GFR Calc  Starting 12/18/2018, serum creatinine based estimated GFR (eGFR) will be   calculated using the Chronic Kidney Disease Epidemiology Collaboration   (CKD-EPI) equation.           Education:  general discussion/verbal explanation  Ways to help improve BP/HTN:   Medications  Lose weight  Diet low in fat and rich in fruits, vegetables and low  fat dairy products  Reduce salt in diet  Do something active for at least 30 minutes a day on most days of the week  Cut down on alcohol (if you drink more than 2 drinks per day)  Decrease stress (exercise, read, yoga, meditation, time for self, etc.)   Patient was given an opportunity to ask questions.    Patient verbalized understanding of this plan and is agreeable.    Cara Rader RN

## 2021-03-15 NOTE — RESULT ENCOUNTER NOTE
Mr. De La Vega,    -A1C test (average blood sugar the last 2-3 months) is above your goal.   ADVISE: I think this pretty much solidifies our decision to start you on Ozempic/semaglutide.  I've been impressed by how well that medication works, so am anxious to see how it works for you.  Let's have you see diabetic education in [place] as we discussed to get you started on it.  If you have further questions about the interpretation of your labs, [email or web address] is a good website to check out for further information.    Please contact the clinic if you have additional questions.  Thank you.    Sincerely,    Tj Verma MD

## 2021-03-16 RX ORDER — HYDROCHLOROTHIAZIDE 25 MG/1
25 TABLET ORAL DAILY
Qty: 90 TABLET | Refills: 0 | OUTPATIENT
Start: 2021-03-16

## 2021-03-16 RX ORDER — TAMSULOSIN HYDROCHLORIDE 0.4 MG/1
CAPSULE ORAL
Qty: 90 CAPSULE | Refills: 0 | OUTPATIENT
Start: 2021-03-16

## 2021-03-17 ENCOUNTER — TELEPHONE (OUTPATIENT)
Dept: FAMILY MEDICINE | Facility: CLINIC | Age: 76
End: 2021-03-17

## 2021-03-17 DIAGNOSIS — E11.42 TYPE 2 DIABETES MELLITUS WITH DIABETIC POLYNEUROPATHY, WITHOUT LONG-TERM CURRENT USE OF INSULIN (H): ICD-10-CM

## 2021-03-17 NOTE — TELEPHONE ENCOUNTER
[facility] pharmacy calling to inform provider that the Semaglutide that was ordered is not yet available in pharmacies.   New medication will need to be ordered. RN pended pharmacy.    Patient out of insulin so is needing this today.     Krys Hobbs BSN, RN

## 2021-03-17 NOTE — TELEPHONE ENCOUNTER
Substituted Bydureon.  This looks to require a prior authorization.  Chart reviewed.  Rx sent to pt's preferred pharmacy.       [facility] [place]  [facility] [id number] [place] [facility] [place] [place] - [address] [address] [address]    Tj Verma MD

## 2021-03-20 ENCOUNTER — HEALTH MAINTENANCE LETTER (OUTPATIENT)
Age: 76
End: 2021-03-20

## 2021-04-12 DIAGNOSIS — J44.9 CHRONIC OBSTRUCTIVE PULMONARY DISEASE, UNSPECIFIED COPD TYPE (H): ICD-10-CM

## 2021-04-14 DIAGNOSIS — E11.9 TYPE 2 DIABETES MELLITUS WITHOUT COMPLICATION, WITHOUT LONG-TERM CURRENT USE OF INSULIN (H): ICD-10-CM

## 2021-04-14 NOTE — TELEPHONE ENCOUNTER
LOV: 10/21/2020  Patient due for med check  No future appt scheduled    Routing to [facility] to assist in scheduling      Cara Rader RN  [facility]

## 2021-04-15 ENCOUNTER — MYC MEDICAL ADVICE (OUTPATIENT)
Dept: FAMILY MEDICINE | Facility: CLINIC | Age: 76
End: 2021-04-15

## 2021-04-15 RX ORDER — GLIMEPIRIDE 4 MG/1
4 TABLET ORAL
Qty: 90 TABLET | Refills: 1 | OUTPATIENT
Start: 2021-04-15

## 2021-04-15 NOTE — TELEPHONE ENCOUNTER
Pt noted this was discontinued.    Prem LOWE RN   [facility]

## 2021-04-15 NOTE — TELEPHONE ENCOUNTER
The original prescription was discontinued on 3/12/2021 by Tj Verma Jr., MD for the following reason: Alternate therapy.    3/12/221 Telephone Encounter from Dr. Verma:   I called Nik for an unrelated non-medical issue and we got to talking about his diabetes.  Still having glycemic control issues and would like to try semaglutide or similar GLP-1 agonist.  I agree with this so will stop his glimepiride and have him  the GLP-1 agonist and make an appointment with his diabetic educator in [place] to have her instruct him on administration.    Need to verify   MyChart sent      Cara Rader RN  [facility]

## 2021-04-16 NOTE — TELEPHONE ENCOUNTER
Next 5 appointments (look out 90 days)    May 26, 2021  9:00 AM  PHYSICAL with Tj Verma Jr., MD  [facility] ([facility] ) [address] [place] [address] [id number] [phone]        Medication is being filled for 1 time refill only due to:  Patient needs to be seen because due for PX.      Cara Rader RN  [facility]

## 2021-04-22 ENCOUNTER — MYC REFILL (OUTPATIENT)
Dept: EDUCATION SERVICES | Facility: CLINIC | Age: 76
End: 2021-04-22

## 2021-04-22 DIAGNOSIS — E11.9 TYPE 2 DIABETES MELLITUS WITHOUT COMPLICATION, WITHOUT LONG-TERM CURRENT USE OF INSULIN (H): ICD-10-CM

## 2021-05-15 DIAGNOSIS — E11.42 TYPE 2 DIABETES MELLITUS WITH DIABETIC POLYNEUROPATHY, WITHOUT LONG-TERM CURRENT USE OF INSULIN (H): ICD-10-CM

## 2021-05-18 RX ORDER — METFORMIN HCL 500 MG
TABLET, EXTENDED RELEASE 24 HR ORAL
Qty: 180 TABLET | Refills: 0 | Status: SHIPPED | OUTPATIENT
Start: 2021-05-18 | End: 2021-05-26

## 2021-05-18 NOTE — TELEPHONE ENCOUNTER
Medication is being filled for 1 time refill only due to:  Patient needs to be seen because due for med check (already scheduled).    Cara Rader RN  [facility]

## 2021-05-26 ENCOUNTER — OFFICE VISIT (OUTPATIENT)
Dept: FAMILY MEDICINE | Facility: CLINIC | Age: 76
End: 2021-05-26
Payer: COMMERCIAL

## 2021-05-26 VITALS
WEIGHT: 179 LBS | SYSTOLIC BLOOD PRESSURE: 122 MMHG | TEMPERATURE: 97.4 F | HEART RATE: 107 BPM | HEIGHT: 71 IN | OXYGEN SATURATION: 94 % | DIASTOLIC BLOOD PRESSURE: 62 MMHG | BODY MASS INDEX: 25.06 KG/M2 | RESPIRATION RATE: 14 BRPM

## 2021-05-26 DIAGNOSIS — J44.9 CHRONIC OBSTRUCTIVE PULMONARY DISEASE, UNSPECIFIED COPD TYPE (H): ICD-10-CM

## 2021-05-26 DIAGNOSIS — R35.0 BENIGN PROSTATIC HYPERPLASIA WITH URINARY FREQUENCY: ICD-10-CM

## 2021-05-26 DIAGNOSIS — I10 ESSENTIAL HYPERTENSION: ICD-10-CM

## 2021-05-26 DIAGNOSIS — Z00.00 ENCOUNTER FOR MEDICARE ANNUAL WELLNESS EXAM: Primary | ICD-10-CM

## 2021-05-26 DIAGNOSIS — E78.5 HYPERLIPIDEMIA LDL GOAL <70: ICD-10-CM

## 2021-05-26 DIAGNOSIS — N40.1 BENIGN PROSTATIC HYPERPLASIA WITH URINARY FREQUENCY: ICD-10-CM

## 2021-05-26 DIAGNOSIS — E11.42 TYPE 2 DIABETES MELLITUS WITH DIABETIC POLYNEUROPATHY, WITHOUT LONG-TERM CURRENT USE OF INSULIN (H): ICD-10-CM

## 2021-05-26 LAB
ANION GAP SERPL CALCULATED.3IONS-SCNC: 4 MMOL/L (ref 3–14)
BUN SERPL-MCNC: 25 MG/DL (ref 7–30)
CALCIUM SERPL-MCNC: 10.2 MG/DL (ref 8.5–10.1)
CHLORIDE SERPL-SCNC: 97 MMOL/L (ref 94–109)
CHOLEST SERPL-MCNC: 125 MG/DL
CO2 SERPL-SCNC: 32 MMOL/L (ref 20–32)
CREAT SERPL-MCNC: 1.08 MG/DL (ref 0.66–1.25)
GFR SERPL CREATININE-BSD FRML MDRD: 67 ML/MIN/{1.73_M2}
GLUCOSE SERPL-MCNC: 238 MG/DL (ref 70–99)
HBA1C MFR BLD: 7.8 % (ref 0–5.6)
HDLC SERPL-MCNC: 44 MG/DL
LDLC SERPL CALC-MCNC: 48 MG/DL
NONHDLC SERPL-MCNC: 81 MG/DL
POTASSIUM SERPL-SCNC: 3.6 MMOL/L (ref 3.4–5.3)
SODIUM SERPL-SCNC: 133 MMOL/L (ref 133–144)
TRIGL SERPL-MCNC: 164 MG/DL

## 2021-05-26 PROCEDURE — 99207 PR FOOT EXAM NO CHARGE: CPT | Mod: 25 | Performed by: FAMILY MEDICINE

## 2021-05-26 PROCEDURE — 82043 UR ALBUMIN QUANTITATIVE: CPT | Performed by: FAMILY MEDICINE

## 2021-05-26 PROCEDURE — 83036 HEMOGLOBIN GLYCOSYLATED A1C: CPT | Performed by: FAMILY MEDICINE

## 2021-05-26 PROCEDURE — 36415 COLL VENOUS BLD VENIPUNCTURE: CPT | Performed by: FAMILY MEDICINE

## 2021-05-26 PROCEDURE — 99397 PER PM REEVAL EST PAT 65+ YR: CPT | Performed by: FAMILY MEDICINE

## 2021-05-26 PROCEDURE — 80061 LIPID PANEL: CPT | Performed by: FAMILY MEDICINE

## 2021-05-26 PROCEDURE — 80048 BASIC METABOLIC PNL TOTAL CA: CPT | Performed by: FAMILY MEDICINE

## 2021-05-26 PROCEDURE — 99214 OFFICE O/P EST MOD 30 MIN: CPT | Mod: 25 | Performed by: FAMILY MEDICINE

## 2021-05-26 RX ORDER — SIMVASTATIN 20 MG
20 TABLET ORAL AT BEDTIME
Qty: 90 TABLET | Refills: 3 | Status: SHIPPED | OUTPATIENT
Start: 2021-05-26 | End: 2022-06-20

## 2021-05-26 RX ORDER — LOSARTAN POTASSIUM 100 MG/1
100 TABLET ORAL DAILY
Qty: 90 TABLET | Refills: 1 | Status: SHIPPED | OUTPATIENT
Start: 2021-05-26 | End: 2021-11-03

## 2021-05-26 RX ORDER — HYDROCHLOROTHIAZIDE 25 MG/1
25 TABLET ORAL DAILY
Qty: 90 TABLET | Refills: 1 | Status: SHIPPED | OUTPATIENT
Start: 2021-05-26 | End: 2021-09-21

## 2021-05-26 RX ORDER — METFORMIN HCL 500 MG
1000 TABLET, EXTENDED RELEASE 24 HR ORAL
Qty: 180 TABLET | Refills: 1 | Status: SHIPPED | OUTPATIENT
Start: 2021-05-26 | End: 2021-11-03

## 2021-05-26 RX ORDER — ALBUTEROL SULFATE 90 UG/1
2 AEROSOL, METERED RESPIRATORY (INHALATION) EVERY 6 HOURS PRN
Qty: 18 G | Refills: 1 | Status: SHIPPED | OUTPATIENT
Start: 2021-05-26 | End: 2023-06-29

## 2021-05-26 RX ORDER — TAMSULOSIN HYDROCHLORIDE 0.4 MG/1
0.4 CAPSULE ORAL EVERY EVENING
Qty: 90 CAPSULE | Refills: 3 | Status: SHIPPED | OUTPATIENT
Start: 2021-05-26 | End: 2022-03-24

## 2021-05-26 ASSESSMENT — PAIN SCALES - GENERAL: PAINLEVEL: NO PAIN (0)

## 2021-05-26 ASSESSMENT — MIFFLIN-ST. JEOR: SCORE: 1574.07

## 2021-05-26 ASSESSMENT — ACTIVITIES OF DAILY LIVING (ADL): CURRENT_FUNCTION: NO ASSISTANCE NEEDED

## 2021-05-26 NOTE — RESULT ENCOUNTER NOTE
Mr. De La Vega,    -A1C (test of diabetes control the last 2-3 months) is at your goal. Please continue with your current plan. Also, you should make an appointment to see me and recheck your A1C test in 6 months.     If you have further questions about the interpretation of your labs, [email or web address] is a good website to check out for further information.    Please contact the clinic if you have additional questions.  Thank you.    Sincerely,    Tj Verma MD

## 2021-05-26 NOTE — PATIENT INSTRUCTIONS
Patient Education   Personalized Prevention Plan  You are due for the preventive services outlined below.  Your care team is available to assist you in scheduling these services.  If you have already completed any of these items, please share that information with your care team to update in your medical record.  Health Maintenance Due   Topic Date Due     Diabetic Foot Exam  12/30/2020     FALL RISK ASSESSMENT  12/30/2020     Basic Metabolic Panel  06/12/2021     Cholesterol Lab  06/12/2021     Kidney Microalbumin Urine Test  06/12/2021     Eye Exam  06/15/2021

## 2021-05-27 LAB
CREAT UR-MCNC: 53 MG/DL
MICROALBUMIN UR-MCNC: 11 MG/L
MICROALBUMIN/CREAT UR: 20.6 MG/G CR (ref 0–17)

## 2021-05-27 NOTE — RESULT ENCOUNTER NOTE
Mr. De La Vega,    -Cholesterol levels are at your goal levels.  ADVISE: continuing your medication, a regular exercise program with at least 150 minutes of aerobic exercise per week, and eating a low saturated fat/low carbohydrate diet.  Also, you should recheck this fasting cholesterol panel in 12 months.  -Kidney function (GFR) is normal.  -Sodium is normal.  -Potassium is normal.  -Calcium is normal.  -Glucose is elevated due to your diabetes.  -Microalbumin (urine protein) level is elevated. This is suggestive of early damage to your kidneys from high blood pressure and diabetes.  ADVISE: avoiding anti-inflamatory agents such as ibuprofen (Advil, Motrin) or naproxen (Aleve) as much as possible, keeping your blood pressure in a normal range, and continuing your medication (losartan) that helps protect your kidneys.  Also, this should be rechecked in 1 year.     If you have further questions about the interpretation of your labs, [email or web address] is a good website to check out for further information.    Please contact the clinic if you have additional questions.  Thank you.    Sincerely,    Tj Verma MD

## 2021-06-03 ENCOUNTER — MYC MEDICAL ADVICE (OUTPATIENT)
Dept: FAMILY MEDICINE | Facility: CLINIC | Age: 76
End: 2021-06-03

## 2021-06-03 DIAGNOSIS — E11.42 TYPE 2 DIABETES MELLITUS WITH DIABETIC POLYNEUROPATHY, WITHOUT LONG-TERM CURRENT USE OF INSULIN (H): Primary | ICD-10-CM

## 2021-06-04 RX ORDER — EXENATIDE 2 MG/.85ML
2 INJECTION, SUSPENSION, EXTENDED RELEASE SUBCUTANEOUS
Qty: 2.55 ML | Refills: 3 | Status: SHIPPED | OUTPATIENT
Start: 2021-06-04 | End: 2021-08-16

## 2021-06-04 NOTE — TELEPHONE ENCOUNTER
Fax received from [facility] noting the Bydureon is no longer available, substitute with Bydureon BCISE. Alternative medication sent in.    Prem LOWE RN   [facility] [place] [facility]

## 2021-07-18 DIAGNOSIS — E11.42 TYPE 2 DIABETES MELLITUS WITH DIABETIC POLYNEUROPATHY, WITHOUT LONG-TERM CURRENT USE OF INSULIN (H): ICD-10-CM

## 2021-07-21 RX ORDER — GABAPENTIN 100 MG/1
CAPSULE ORAL
Qty: 279 CAPSULE | Refills: 1 | Status: SHIPPED | OUTPATIENT
Start: 2021-07-21 | End: 2021-07-28

## 2021-07-21 NOTE — TELEPHONE ENCOUNTER
Routing refill request to provider for review/approval because:  Drug not on the [facility] refill protocol   Artur Nagy RN, BSN

## 2021-07-22 ENCOUNTER — MYC MEDICAL ADVICE (OUTPATIENT)
Dept: FAMILY MEDICINE | Facility: CLINIC | Age: 76
End: 2021-07-22

## 2021-07-28 ENCOUNTER — MYC MEDICAL ADVICE (OUTPATIENT)
Dept: FAMILY MEDICINE | Facility: CLINIC | Age: 76
End: 2021-07-28

## 2021-07-28 DIAGNOSIS — E11.42 TYPE 2 DIABETES MELLITUS WITH DIABETIC POLYNEUROPATHY, WITHOUT LONG-TERM CURRENT USE OF INSULIN (H): ICD-10-CM

## 2021-07-28 RX ORDER — GABAPENTIN 300 MG/1
300 CAPSULE ORAL 3 TIMES DAILY
Qty: 270 CAPSULE | Refills: 1 | Status: SHIPPED | OUTPATIENT
Start: 2021-07-28 | End: 2021-11-05

## 2021-07-28 NOTE — TELEPHONE ENCOUNTER
Routing to PCP - please see my chart message please review and advise as pt is taking differently than what was refilled     Thank you     Monie Levin RN, BSN  [facility]

## 2021-07-28 NOTE — TELEPHONE ENCOUNTER
Called pharmacy - pt picked up 279 tabs of gabapentin on 6/11/2021     My chart message sent to pt     Monie Levin RN, BSN  [facility]

## 2021-08-01 ENCOUNTER — TRANSFERRED RECORDS (OUTPATIENT)
Dept: HEALTH INFORMATION MANAGEMENT | Facility: CLINIC | Age: 76
End: 2021-08-01

## 2021-08-01 LAB — RETINOPATHY: NEGATIVE

## 2021-09-04 ENCOUNTER — HEALTH MAINTENANCE LETTER (OUTPATIENT)
Age: 76
End: 2021-09-04

## 2021-09-28 DIAGNOSIS — J44.9 CHRONIC OBSTRUCTIVE PULMONARY DISEASE, UNSPECIFIED COPD TYPE (H): ICD-10-CM

## 2021-09-29 NOTE — TELEPHONE ENCOUNTER
Routing refill request to provider for review/approval because:  No ACT on file    Zulema Ramirez, RN

## 2021-10-10 DIAGNOSIS — E11.42 TYPE 2 DIABETES MELLITUS WITH DIABETIC POLYNEUROPATHY, WITHOUT LONG-TERM CURRENT USE OF INSULIN (H): ICD-10-CM

## 2021-10-12 RX ORDER — EXENATIDE 2 MG/.85ML
INJECTION, SUSPENSION, EXTENDED RELEASE SUBCUTANEOUS
Qty: 10.2 ML | Refills: 0 | Status: SHIPPED | OUTPATIENT
Start: 2021-10-12 | End: 2022-01-03

## 2021-10-12 NOTE — TELEPHONE ENCOUNTER
Prescription approved per [facility] Refill Protocol.  Artur Nagy RN, BSN

## 2021-10-24 NOTE — PROGRESS NOTES
SUBJECTIVE:   Jaylen De La Vega is a 72 year old male who presents to clinic today for the following health issues:    Diabetes Follow-up    Patient is checking blood sugars: twice daily.    Blood sugar testing frequency justification: Uncontrolled diabetes  Results are as follows:         am - 130-140's              postprandial after supper- 120    Diabetic concerns: other - needs more testing supplies      Symptoms of hypoglycemia (low blood sugar): dizzy, nausea - this happens often when not eating      Paresthesias (numbness or burning in feet) or sores: Yes occasional warmth but not burning      Date of last diabetic eye exam: 3/5/18    Diabetes Management Resources    Hyperlipidemia Follow-Up      Rate your low fat/cholesterol diet?: good    Taking statin?  Yes, no muscle aches from statin    Other lipid medications/supplements?:  none    Hypertension Follow-up      Outpatient blood pressures are being checked at home.  Results are 110-120 systolic.    Low Salt Diet: low salt    BP Readings from Last 2 Encounters:   09/24/18 100/64   05/14/18 118/78     Hemoglobin A1C (%)   Date Value   08/09/2018 7.1 (H)   04/12/2018 8.8 (H)     LDL Cholesterol Calculated (mg/dL)   Date Value   10/14/2017 78   06/25/2016 76       Amount of exercise or physical activity: Walking (about 5 miles per day) and some light weight lifting (5lbs)     Problems taking medications regularly: No    Medication side effects: none    Diet: low salt, low fat/cholesterol and low sugar       Diabetes- Pt notes ever since we had him do 1,000 mg Metformin BID, he had some sugars readings in the 50's-70's. He also was becoming hypoglycemic around 1-2pm. He has since stopped taking Metformin in the morning, and this has improved his hypoglycemic episodes. Pt notes he was experiencing diarrhea with 1,000 mg Metformin BID. He is currently taking 4 mg Glimepiride in the morning, and 500 mg Metformin in the evenings. Pt is running out of diabetes  "testing supplies.    Preventative measures- Pt is not fasting today. Pt requesting flu shot today.    COPD- Pt quit smoking 7-8 years ago. Pt only uses albuterol inhaler as needed but has not had to use it this year. He has not been on a daily preventative inhaler for awhile. Pt was 14 y/o when he started smoking. He thinks he has been developing cold symptoms. Pt thinks his insurance does not cover Spiriva and is $1,000 per month.        Problem list and histories reviewed & adjusted, as indicated.  Additional history: as documented    Reviewed and updated as needed this visit by clinical staff  Tobacco  Allergies  Meds  Problems  Med Hx       Reviewed and updated as needed this visit by Provider  Allergies  Meds  Problems       ROS:  Constitutional, HEENT, cardiovascular, pulmonary, gi and gu systems are negative, except as otherwise noted.    This document serves as a record of the services and decisions personally performed and made by Tj Verma MD. It was created on his behalf by Kalpesh Arango, a trained medical scribe. The creation of this document is based on the provider's statements to the medical scribe.  Kalpesh Arango 10:06 AM September 24, 2018    OBJECTIVE:     /64  Pulse 95  Temp 97.8  F (36.6  C) (Oral)  Ht 1.803 m (5' 11\")  Wt 82.1 kg (181 lb)  SpO2 93%  BMI 25.24 kg/m2  Body mass index is 25.24 kg/(m^2).  GENERAL: healthy, alert and no distress  NECK: no adenopathy, no asymmetry, masses, or scars and thyroid normal to palpation  RESP: lungs clear to auscultation - no rales, rhonchi or wheezes  CV: regular rate and rhythm, normal S1 S2, no S3 or S4, no murmur, click or rub, no peripheral edema and peripheral pulses strong    Diagnostic Test Results:  Results for orders placed or performed in visit on 09/24/18 (from the past 24 hour(s))   Spirometry, Breathing Capacity: Normal Order, Clinic Performed   Result Value Ref Range    FEV-1      FVC      FEV1/FVC      FEF 25/75 "       ASSESSMENT/PLAN:     (E11.9) Type 2 diabetes mellitus without complication, without long-term current use of insulin (H)  (primary encounter diagnosis)  Comment: Pt experienced diarrhea with his 1,000 mg BID of Metformin that we increased him to 4 months ago. He also had hypoglycemic sugars in the 50s-70s and has decreased his Metformin since. He is currently taking 4 mg Glimepiride in the morning, and 500 mg Metformin in the evenings. Discussed with pt that Metformin is not supposed to make him hypoglycemic. Will switch him to XR Metformin and have him continue to 500 mg in the evenings along with 4 mg Glimepiride in the mornings. Pt declined to recheck A1C today given this was checked around 6 weeks ago. Will refill his diabetes testing supplies. Will also recheck kidney function.  Plan: blood glucose monitoring (ONETOUCH VERIO IQ)         test strip, Lipid panel reflex to direct LDL         Non-fasting, Albumin Random Urine Quantitative         with Creat Ratio, metFORMIN (GLUCOPHAGE-XR) 500        MG 24 hr tablet        Follow up based on labs and in 6 months for diabetes recheck.    (E78.5) Hyperlipidemia LDL goal <70  Comment: Pt was not fasting, but agreed to recheck lipid panel. In the interim, will refill his simvastatin and have him continue to take as prescribed. Also encouraged healthy diet and exercise to help maintain healthy cholesterol levels.  Plan: simvastatin (ZOCOR) 20 MG tablet, Lipid panel         reflex to direct LDL Non-fasting        Follow up based on labs.    (J44.9) Chronic obstructive pulmonary disease, unspecified COPD type (H)  Comment: Recommended we repeat spirometry today; pt was in agreement with this. Spirometry testing today showed he has mild COPD. He has been on Spiriva in the past; recommended we put him back on this to prevent his COPD from worsening. If his insurance does not cover Spiriva and it is $1,000 per month as it was previously when he was on it, advised him  not to  this prescription, and we will find an alternative medication that is more reasonably priced. Atrovent?  Combivent?  Lastly, will refill his albuterol inhaler and have him continue to use as needed.  Plan: Spirometry, Breathing Capacity: Normal Order,         Clinic Performed, COPD ACTION PLAN, tiotropium         (SPIRIVA HANDIHALER) 18 MCG capsule, albuterol         (PROAIR HFA/PROVENTIL HFA/VENTOLIN HFA) 108 (90        Base) MCG/ACT inhaler        Follow up if needed.    (Z23) Need for prophylactic vaccination and inoculation against influenza  Comment: Pt requesting flu shot today.  Plan: FLU VACCINE, INCREASED ANTIGEN, PRESV FREE, AGE        65+ [94469], Vaccine Administration, Initial         [[id number]]        Follow up if needed.    The information in this document, created by the medical scribe for me, accurately reflects the services I personally performed and the decisions made by me. I have reviewed and approved this document for accuracy prior to leaving the patient care area.  September 24, 2018 10:06 AM    Tj Verma Jr, MD  [facility]    Injectable Influenza Immunization Documentation    1.  Is the person to be vaccinated sick today?   No    2. Does the person to be vaccinated have an allergy to a component   of the vaccine?   No  Egg Allergy Algorithm Link    3. Has the person to be vaccinated ever had a serious reaction   to influenza vaccine in the past?   No    4. Has the person to be vaccinated ever had Guillain-Barré syndrome?   No    Form completed by pt  Chloé Clark CMA              English

## 2021-10-28 ENCOUNTER — DOCUMENTATION ONLY (OUTPATIENT)
Dept: LAB | Facility: CLINIC | Age: 76
End: 2021-10-28

## 2021-10-28 NOTE — PROGRESS NOTES
Jaylen De La Vega has an upcoming lab appointment:    Future Appointments   Date Time Provider Department Center   11/10/2021  7:30 AM RV LAB RVLABR RV   11/17/2021  2:00 PM Tj Verma Jr., MD RVFP RV     Patient is scheduled for the following lab(s): PT HAS LAB VISIT FOR FASTING LABS ON 11/09, NEED ORDERS    Patient either has no future order, or has Health Maintenance labs due. Please review and place either future orders or HMPO (Review of Health Maintenance Protocol Orders), as appropriate.    Health Maintenance Due   Topic     A1C      ANNUAL REVIEW OF HM ORDERS      Cara Pickard

## 2021-11-02 DIAGNOSIS — E11.42 TYPE 2 DIABETES MELLITUS WITH DIABETIC POLYNEUROPATHY, WITHOUT LONG-TERM CURRENT USE OF INSULIN (H): ICD-10-CM

## 2021-11-02 DIAGNOSIS — I10 ESSENTIAL HYPERTENSION: ICD-10-CM

## 2021-11-03 RX ORDER — METFORMIN HCL 500 MG
1000 TABLET, EXTENDED RELEASE 24 HR ORAL
Qty: 180 TABLET | Refills: 0 | Status: SHIPPED | OUTPATIENT
Start: 2021-11-03 | End: 2022-02-01

## 2021-11-03 RX ORDER — LOSARTAN POTASSIUM 100 MG/1
TABLET ORAL
Qty: 90 TABLET | Refills: 1 | Status: SHIPPED | OUTPATIENT
Start: 2021-11-03 | End: 2022-05-20

## 2021-11-05 RX ORDER — GABAPENTIN 300 MG/1
CAPSULE ORAL
Qty: 270 CAPSULE | Refills: 1 | Status: SHIPPED | OUTPATIENT
Start: 2021-11-05 | End: 2022-06-28

## 2021-11-10 ENCOUNTER — LAB (OUTPATIENT)
Dept: LAB | Facility: CLINIC | Age: 76
End: 2021-11-10
Payer: COMMERCIAL

## 2021-11-10 DIAGNOSIS — E11.9 TYPE 2 DIABETES MELLITUS WITHOUT COMPLICATION, WITHOUT LONG-TERM CURRENT USE OF INSULIN (H): Primary | ICD-10-CM

## 2021-11-10 LAB — HBA1C MFR BLD: 8.1 % (ref 0–5.6)

## 2021-11-10 PROCEDURE — 36415 COLL VENOUS BLD VENIPUNCTURE: CPT

## 2021-11-10 PROCEDURE — 83036 HEMOGLOBIN GLYCOSYLATED A1C: CPT

## 2021-11-10 NOTE — RESULT ENCOUNTER NOTE
Mr. De La Vega,    -A1C test (average blood sugar the last 2-3 months) is above your goal.   ADVISE: We'll discuss at your upcoming appointment.    If you have further questions about the interpretation of your labs, [email or web address] is a good website to check out for further information.    Please contact the clinic if you have additional questions.  Thank you.    Sincerely,    Tj Verma MD

## 2021-11-17 ENCOUNTER — VIRTUAL VISIT (OUTPATIENT)
Dept: FAMILY MEDICINE | Facility: CLINIC | Age: 76
End: 2021-11-17
Payer: COMMERCIAL

## 2021-11-17 DIAGNOSIS — E11.42 TYPE 2 DIABETES MELLITUS WITH DIABETIC POLYNEUROPATHY, WITHOUT LONG-TERM CURRENT USE OF INSULIN (H): Primary | ICD-10-CM

## 2021-11-17 PROCEDURE — 99213 OFFICE O/P EST LOW 20 MIN: CPT | Mod: 95 | Performed by: FAMILY MEDICINE

## 2021-11-17 NOTE — ASSESSMENT & PLAN NOTE
Diabetes is improving/stable/worsening: worsening.  Regular aerobic exercise. and Add canagliflozin to metformin & exenatide.    Diabetes will be reassessed in 1 month.

## 2021-11-17 NOTE — PROGRESS NOTES
"Nik is a 75 year old who is being evaluated via a billable video visit.      How would you like to obtain your AVS? MyChart  If the video visit is dropped, the invitation should be resent by: Text to cell phone: [phone]  Will anyone else be joining your video visit? No      Video Start Time: 2:18 PM    Assessment & Plan   Problem List Items Addressed This Visit     Type 2 diabetes mellitus with diabetic polyneuropathy, without long-term current use of insulin (H) - Primary     Diabetes is improving/stable/worsening: worsening.  Regular aerobic exercise. and Add canagliflozin to metformin & exenatide.    Diabetes will be reassessed in 1 month.         Relevant Medications    canagliflozin (INVOKANA) 100 MG tablet    Other Relevant Orders    **A1C FUTURE 3mo                    See Patient Instructions    Return in about 6 weeks (around 12/29/2021) for Lab appointment.    Tj Verma Jr, MD  [facility]   Nik is a 75 year old who presents for the following health issues     HPI   Combined Dm / Lipid / Htn Visit    Question 11/17/2021  2:02 PM CST - Filed by Patient   How often are you checking your blood sugars? Continuous glucose monitor   Comments - checking blood sugars: check many times a day   Average blood sugar result - am:   130   Average blood sugar result - noon: 175   Average blood sugar result - supper: 130   Average blood sugar result - bedtime: 125   Average blood sugar result - after meals (postprandial): 200   Which symptoms do you notice when your blood sugar is low? Dizzy   Comments: Other Symptoms of Low Blood Sugar / When do Symptoms Occur - (i.e. when blood sugars are below: ___) none   Have you experienced any paresthesias (numbness or burning in feet) or sores?: Yes   Have you had a diabetic eye exam within the last year? Yes   Are you following a low fat/low cholesterol diet?   yes   Are you taking a \"statin\" medication or niaspan? yes   If you are " "taking a \"statin\" medication, have you been experiencing any muscle aches or other side effects? no   Are you taking other supplements or other medications to lower your cholesterol? no   How often are you checking your blood pressures? 3xper week   What are the results of your blood pressure checks? 112>60   Are you following a low salt diet? Yes   Outside of work, how many days during the week do you exercise and how many hours each time? 7 days   Do you have any problems taking medications regularly? no   Do you have any side effects from medication? no   Are you on a special diet and if so, what kind (i.e. regular, low salt, etc)? low carb no added sugars   Have there been any changes to your medical or surgical history? no   Have there been any changes to your social history? (tobacco use, alcohol use, sexual activity) no   Have there been any changes in your allergies? no   Have there been any changes or updates to the medications you take? no   Do any of your medications need to be refilled? no   Please enter the name of the pharmacy you use (clicking on the icon below will allow you to type in your [facility]   Do you have any additional concerns to address? no   Over the past 2 weeks, how often have you been bothered by any of the following problems?    Q1: Little interest or pleasure in doing things Not at all   Q2: Feeling down, depressed or hopeless Not at all   PHQ-2 Score (range: 0 - 6) 0             Review of Systems   CONSTITUTIONAL: NEGATIVE for fever, chills, change in weight  ENT/MOUTH: NEGATIVE for ear, mouth and throat problems  RESP: NEGATIVE for significant cough or SOB  CV: NEGATIVE for chest pain, palpitations or peripheral edema      Objective           Vitals:  No vitals were obtained today due to virtual visit.    Physical Exam   GENERAL: Healthy, alert and no distress  EYES: Eyes grossly normal to inspection.  No discharge or erythema, or obvious scleral/conjunctival " abnormalities.  RESP: No audible wheeze, cough, or visible cyanosis.  No visible retractions or increased work of breathing.    SKIN: Visible skin clear. No significant rash, abnormal pigmentation or lesions.  NEURO: Cranial nerves grossly intact.  Mentation and speech appropriate for age.  PSYCH: Mentation appears normal, affect normal/bright, judgement and insight intact, normal speech and appearance well-groomed.                Video-Visit Details    Type of service:  Video Visit    Video End Time:2:38 PM    Originating Location (pt. Location): Home    Distant Location (provider location):  [facility]     Platform used for Video Visit: LiveNinja

## 2021-12-27 ENCOUNTER — MYC MEDICAL ADVICE (OUTPATIENT)
Dept: FAMILY MEDICINE | Facility: CLINIC | Age: 76
End: 2021-12-27
Payer: COMMERCIAL

## 2021-12-27 DIAGNOSIS — J44.9 CHRONIC OBSTRUCTIVE PULMONARY DISEASE, UNSPECIFIED COPD TYPE (H): Primary | ICD-10-CM

## 2021-12-28 NOTE — TELEPHONE ENCOUNTER
Please see my chart message below     Please review and advise     Thank you     Monie Levin RN, BSN  [facility]

## 2021-12-29 RX ORDER — TIOTROPIUM BROMIDE 18 UG/1
18 CAPSULE ORAL; RESPIRATORY (INHALATION) DAILY
Qty: 90 CAPSULE | Refills: 3 | Status: SHIPPED | OUTPATIENT
Start: 2021-12-29 | End: 2022-11-29

## 2022-01-02 DIAGNOSIS — E11.9 TYPE 2 DIABETES MELLITUS WITHOUT COMPLICATION, WITHOUT LONG-TERM CURRENT USE OF INSULIN (H): ICD-10-CM

## 2022-01-03 NOTE — TELEPHONE ENCOUNTER
Routing refill request to provider for review/approval because:  Drug not on the [facility] refill protocol         Pending Prescriptions:                       Disp   Refills    Continuous Blood Gluc Sensor (FREESTYLE LI*       11       Sig: PLACE SENSOR ON BODY AS DIRECTED AND CHANGE EVERY 14           DAYS

## 2022-01-28 DIAGNOSIS — E11.42 TYPE 2 DIABETES MELLITUS WITH DIABETIC POLYNEUROPATHY, WITHOUT LONG-TERM CURRENT USE OF INSULIN (H): ICD-10-CM

## 2022-02-01 ENCOUNTER — LAB (OUTPATIENT)
Dept: LAB | Facility: CLINIC | Age: 77
End: 2022-02-01
Payer: COMMERCIAL

## 2022-02-01 DIAGNOSIS — E11.42 TYPE 2 DIABETES MELLITUS WITH DIABETIC POLYNEUROPATHY, WITHOUT LONG-TERM CURRENT USE OF INSULIN (H): ICD-10-CM

## 2022-02-01 LAB — HBA1C MFR BLD: 7.4 % (ref 0–5.6)

## 2022-02-01 PROCEDURE — 83036 HEMOGLOBIN GLYCOSYLATED A1C: CPT

## 2022-02-01 PROCEDURE — 36415 COLL VENOUS BLD VENIPUNCTURE: CPT

## 2022-02-01 RX ORDER — METFORMIN HCL 500 MG
1000 TABLET, EXTENDED RELEASE 24 HR ORAL
Qty: 180 TABLET | Refills: 0 | Status: SHIPPED | OUTPATIENT
Start: 2022-02-01 | End: 2022-06-28

## 2022-02-01 NOTE — TELEPHONE ENCOUNTER
Prescription approved per [employer] Refill Protocol.    Rosi Hammer RN  [facility]

## 2022-02-17 ENCOUNTER — MYC MEDICAL ADVICE (OUTPATIENT)
Dept: FAMILY MEDICINE | Facility: CLINIC | Age: 77
End: 2022-02-17
Payer: COMMERCIAL

## 2022-02-17 DIAGNOSIS — E11.42 TYPE 2 DIABETES MELLITUS WITH DIABETIC POLYNEUROPATHY, WITHOUT LONG-TERM CURRENT USE OF INSULIN (H): Primary | ICD-10-CM

## 2022-02-18 NOTE — TELEPHONE ENCOUNTER
This was ordered in November.     Routing to provider to review and advise.     Rosi Hammer RN  [facility]

## 2022-02-19 RX ORDER — DAPAGLIFLOZIN 5 MG/1
5 TABLET, FILM COATED ORAL DAILY
Qty: 90 TABLET | Refills: 1 | Status: SHIPPED | OUTPATIENT
Start: 2022-02-19 | End: 2022-07-19

## 2022-03-23 DIAGNOSIS — N40.1 BENIGN PROSTATIC HYPERPLASIA WITH URINARY FREQUENCY: ICD-10-CM

## 2022-03-23 DIAGNOSIS — R35.0 BENIGN PROSTATIC HYPERPLASIA WITH URINARY FREQUENCY: ICD-10-CM

## 2022-03-24 RX ORDER — TAMSULOSIN HYDROCHLORIDE 0.4 MG/1
0.4 CAPSULE ORAL EVERY EVENING
Qty: 90 CAPSULE | Refills: 0 | Status: SHIPPED | OUTPATIENT
Start: 2022-03-24 | End: 2022-07-19

## 2022-03-24 NOTE — TELEPHONE ENCOUNTER
Prescription approved per [facility] Refill Protocol.    Rosi Hammer RN  [facility]

## 2022-05-06 ENCOUNTER — OFFICE VISIT (OUTPATIENT)
Dept: URGENT CARE | Facility: URGENT CARE | Age: 77
End: 2022-05-06
Payer: COMMERCIAL

## 2022-05-06 VITALS
HEART RATE: 66 BPM | RESPIRATION RATE: 16 BRPM | DIASTOLIC BLOOD PRESSURE: 66 MMHG | HEIGHT: 71 IN | BODY MASS INDEX: 25.06 KG/M2 | SYSTOLIC BLOOD PRESSURE: 118 MMHG | OXYGEN SATURATION: 95 % | WEIGHT: 179 LBS | TEMPERATURE: 98.8 F

## 2022-05-06 DIAGNOSIS — J30.2 SEASONAL ALLERGIC RHINITIS, UNSPECIFIED TRIGGER: ICD-10-CM

## 2022-05-06 DIAGNOSIS — J01.00 ACUTE NON-RECURRENT MAXILLARY SINUSITIS: Primary | ICD-10-CM

## 2022-05-06 PROCEDURE — 99213 OFFICE O/P EST LOW 20 MIN: CPT | Performed by: PHYSICIAN ASSISTANT

## 2022-05-06 ASSESSMENT — ENCOUNTER SYMPTOMS
COUGH: 0
RHINORRHEA: 1
SINUS PAIN: 1
SHORTNESS OF BREATH: 0
SINUS PRESSURE: 1
FEVER: 0

## 2022-05-06 NOTE — PROGRESS NOTES
Assessment & Plan:        ICD-10-CM    1. Acute non-recurrent maxillary sinusitis  J01.00 amoxicillin-clavulanate (AUGMENTIN) 875-125 MG tablet   2. Seasonal allergic rhinitis, unspecified trigger  J30.2          Plan/Clinical Decision Making:    Patient having thick PND, sinus pain.   Will treat with course of Augmentin.   Also has seasonal allergies.   Reviewed treatment options.     Avoid nasal decongestant, only for very short courses of use.     Continue to monitor blood sugars.   Discussed medication can go up to 10mg in 4-12 weeks if needed.   Patient will follow up with PCP.       Patient Instructions   1) Take course of antibiotic.   This can cause some stomach symptoms.   Yogurt/probiotics can help.     2) Take Claritin (loratadine) or Allegra or Zyrtec once a day during allergy season.   Also use Flonase nasal spray every day, one spray each nostril.     Follow up if not getting better.     Thanks,  Pina Lala PA-C              At the end of the encounter, I discussed results, diagnosis, medications. Discussed red flags for immediate return to clinic/ER, as well as indications for follow up if no improvement. Patient understood and agreed to plan. Patient was stable for discharge.        Pina Lala PA-C on 5/6/2022 at 10:57 AM          Subjective:     HPI:    Nik is a 76 year old male who presents to clinic today for the following health issues:  Chief Complaint   Patient presents with     URI     Sinus congestion, sinus pressure, drainage, much phlem from his nose,, pain on the roof of his mouth- ST x 12 days ago --   has allergies - suing a nasal spray --NEG covid test on Thursday and Monday     HPI    Patient complains of symptoms for 12 days.  Started with runny nose.  ST started after using nasal decongestant. Stopped using the nasal spray.   Sinus drainage is dark and green. Started with clear drainage and now darker thick drainage.   Has some sinus pain. Tried salt water gargles-  irritated throat.   Has had 3 at home covid tests- negative.   Does have history of seasonal allergies.     Patient diabetic, blood sugars running high in morning.   Was on Innovokana and diabetes was under better control.   Ended up not being covered any longer and switched to Faxiga.   Has been on a month of 5mg.  Has Follow-up with PCP in two months.     History obtained from the patient.    Review of Systems   Constitutional: Negative for fever.   HENT: Positive for congestion, rhinorrhea, sinus pressure and sinus pain.    Respiratory: Negative for cough and shortness of breath.        Patient Active Problem List   Diagnosis     Degeneration of lumbar or lumbosacral intervertebral disc     Diverticulosis of large intestine     Essential hypertension     Hyperlipidemia LDL goal <70     Advanced directives, counseling/discussion     Lumbar disc herniation     Type 2 diabetes mellitus with diabetic polyneuropathy, without long-term current use of insulin (H)     Chronic obstructive pulmonary disease, unspecified COPD type (H)     Premature atrial contractions     Aortic insufficiency with aortic stenosis     Spinal stenosis of lumbar region with radiculopathy     Benign prostatic hyperplasia with urinary frequency        Past Medical History:   Diagnosis Date     Arthritis     mishel  fingers     Asthma      Cellulitis and abscess of face [id number]    abstr [id number]     COPD (chronic obstructive pulmonary disease) (H)      Diabetes (H)      Diverticulitis      Hyperlipemia      Hypertension        Social History     Tobacco Use     Smoking status: Former Smoker     Packs/day: 0.50     Years: 50.00     Pack years: 25.00     Types: Cigarettes     Start date: 6/14/1962     Quit date: 1/1/2012     Years since quitting: 10.3     Smokeless tobacco: Never Used   Substance Use Topics     Alcohol use: Yes     Comment: 3 per week             Objective:     Vitals:    05/06/22 1043   BP: 118/66   BP Location: Right arm   Patient  "Position: Chair   Cuff Size: Adult Regular   Pulse: 66   Resp: 16   Temp: 98.8  F (37.1  C)   TempSrc: Oral   SpO2: 95%   Weight: 81.2 kg (179 lb)   Height: 1.803 m (5' 11\")         Physical Exam   EXAM:   Pleasant, alert, appropriate appearance. NAD.  Head Exam: Normocephalic, atraumatic.  Eye Exam:  , non icteric/injection.    Ear Exam: TMs grey without bulging. Normal canals.  Normal pinna.  Nose Exam: Normal external nose.    OroPharynx Exam:  Moist mucous membranes. Positive green/brown thick PND, mild erythema, no exudates.   Neck/Thyroid Exam:  No LAD.    Chest/Respiratory Exam: CTAB.  Cardiovascular Exam: RRR. No murmur or rubs.      Results:  No results found for any visits on 05/06/22.    "

## 2022-05-06 NOTE — PATIENT INSTRUCTIONS
1) Take course of antibiotic.   This can cause some stomach symptoms.   Yogurt/probiotics can help.     2) Take Claritin (loratadine) or Allegra or Zyrtec once a day during allergy season.   Also use Flonase nasal spray every day, one spray each nostril.     Follow up if not getting better.     Thanks,  Pina Lala PA-C

## 2022-05-19 DIAGNOSIS — I10 ESSENTIAL HYPERTENSION: ICD-10-CM

## 2022-05-20 RX ORDER — LOSARTAN POTASSIUM 100 MG/1
TABLET ORAL
Qty: 90 TABLET | Refills: 1 | Status: ON HOLD | OUTPATIENT
Start: 2022-05-20 | End: 2022-09-19

## 2022-05-20 NOTE — TELEPHONE ENCOUNTER
Prescription approved per [facility] Refill Protocol.    Rosi Hammer RN  [facility]

## 2022-06-11 ENCOUNTER — HEALTH MAINTENANCE LETTER (OUTPATIENT)
Age: 77
End: 2022-06-11

## 2022-06-13 DIAGNOSIS — E11.42 TYPE 2 DIABETES MELLITUS WITH DIABETIC POLYNEUROPATHY, WITHOUT LONG-TERM CURRENT USE OF INSULIN (H): ICD-10-CM

## 2022-06-15 RX ORDER — EXENATIDE 2 MG/.85ML
INJECTION, SUSPENSION, EXTENDED RELEASE SUBCUTANEOUS
Qty: 3.4 ML | Refills: 2 | Status: SHIPPED | OUTPATIENT
Start: 2022-06-15 | End: 2022-09-07

## 2022-06-15 NOTE — TELEPHONE ENCOUNTER
Routing refill request to provider for review/approval because:   GLP-1 Agonists Protocol Failed 06/13/2022 08:31 AM   Protocol Details  Normal serum creatinine on file in past 12 months        Appointments in Next Year    Jun 22, 2022  7:45 AM  Lab visit with RV LAB  [facility] ([facility] ) [phone]   Jun 29, 2022  9:30 AM  (Arrive by 9:10 AM)  Annual Wellness Visit with Tj Verma Jr., MD  [facility] ([facility] ) [phone]        Monie Levin RN, BSN  [facility]

## 2022-06-19 DIAGNOSIS — E78.5 HYPERLIPIDEMIA LDL GOAL <70: ICD-10-CM

## 2022-06-20 RX ORDER — SIMVASTATIN 20 MG
20 TABLET ORAL AT BEDTIME
Qty: 90 TABLET | Refills: 3 | Status: SHIPPED | OUTPATIENT
Start: 2022-06-20 | End: 2023-06-10

## 2022-06-20 NOTE — TELEPHONE ENCOUNTER
Appointments in Next Year    Jun 22, 2022  7:45 AM  Lab visit with RV LAB  [facility] ([facility] ) [phone]   Jun 29, 2022  9:30 AM  (Arrive by 9:10 AM)  Annual Wellness Visit with Tj Verma Jr., MD  [facility] ([facility] ) [phone]        Routing refill request to provider for review/approval because:   Statins Protocol Failed 06/19/2022 07:51 AM   Protocol Details  LDL on file in past 12 months          Please advise   Thank you     Monie Levin RN, BSN  [facility]

## 2022-06-22 ENCOUNTER — LAB (OUTPATIENT)
Dept: LAB | Facility: CLINIC | Age: 77
End: 2022-06-22
Payer: COMMERCIAL

## 2022-06-22 DIAGNOSIS — E11.42 TYPE 2 DIABETES MELLITUS WITH DIABETIC POLYNEUROPATHY, WITHOUT LONG-TERM CURRENT USE OF INSULIN (H): Primary | ICD-10-CM

## 2022-06-22 DIAGNOSIS — Z13.220 SCREENING FOR HYPERLIPIDEMIA: ICD-10-CM

## 2022-06-22 LAB — HBA1C MFR BLD: 7.6 % (ref 0–5.6)

## 2022-06-22 PROCEDURE — 36415 COLL VENOUS BLD VENIPUNCTURE: CPT

## 2022-06-22 PROCEDURE — 83036 HEMOGLOBIN GLYCOSYLATED A1C: CPT

## 2022-06-22 NOTE — RESULT ENCOUNTER NOTE
Mr. De La Vega,    -A1C (test of diabetes control the last 2-3 months) is at your goal. Please recheck your A1C test in 3 months. We'll chat about this more at your appointment next week.    If you have further questions about the interpretation of your labs, [email or web address] is a good website to check out for further information.    Please contact the clinic if you have additional questions.  Thank you.    Sincerely,    Tj Verma MD

## 2022-06-25 DIAGNOSIS — E11.42 TYPE 2 DIABETES MELLITUS WITH DIABETIC POLYNEUROPATHY, WITHOUT LONG-TERM CURRENT USE OF INSULIN (H): ICD-10-CM

## 2022-06-28 ENCOUNTER — LAB (OUTPATIENT)
Dept: LAB | Facility: CLINIC | Age: 77
End: 2022-06-28
Payer: COMMERCIAL

## 2022-06-28 DIAGNOSIS — E11.42 TYPE 2 DIABETES MELLITUS WITH DIABETIC POLYNEUROPATHY, WITHOUT LONG-TERM CURRENT USE OF INSULIN (H): ICD-10-CM

## 2022-06-28 DIAGNOSIS — Z13.220 SCREENING FOR HYPERLIPIDEMIA: ICD-10-CM

## 2022-06-28 PROCEDURE — 80048 BASIC METABOLIC PNL TOTAL CA: CPT

## 2022-06-28 PROCEDURE — 82043 UR ALBUMIN QUANTITATIVE: CPT

## 2022-06-28 PROCEDURE — 36415 COLL VENOUS BLD VENIPUNCTURE: CPT

## 2022-06-28 PROCEDURE — 80061 LIPID PANEL: CPT

## 2022-06-28 RX ORDER — METFORMIN HCL 500 MG
1000 TABLET, EXTENDED RELEASE 24 HR ORAL
Qty: 180 TABLET | Refills: 1 | Status: SHIPPED | OUTPATIENT
Start: 2022-06-28 | End: 2022-11-29

## 2022-06-28 RX ORDER — GABAPENTIN 300 MG/1
CAPSULE ORAL
Qty: 270 CAPSULE | Refills: 1 | Status: SHIPPED | OUTPATIENT
Start: 2022-06-28 | End: 2022-11-29

## 2022-06-28 NOTE — TELEPHONE ENCOUNTER
Routing refill request to provider for review/approval because:  Drug not on the [facility] refill protocol   Labs not current:  [facility]

## 2022-06-28 NOTE — TELEPHONE ENCOUNTER
Chart reviewed.  Rx sent to pt's preferred pharmacy.       [facility] [id number] [place] [facility] [address] [address]    Tj Verma MD

## 2022-06-29 ENCOUNTER — OFFICE VISIT (OUTPATIENT)
Dept: FAMILY MEDICINE | Facility: CLINIC | Age: 77
End: 2022-06-29
Payer: COMMERCIAL

## 2022-06-29 VITALS
TEMPERATURE: 97 F | SYSTOLIC BLOOD PRESSURE: 118 MMHG | BODY MASS INDEX: 24.22 KG/M2 | DIASTOLIC BLOOD PRESSURE: 70 MMHG | OXYGEN SATURATION: 92 % | HEIGHT: 71 IN | WEIGHT: 173 LBS | HEART RATE: 92 BPM

## 2022-06-29 DIAGNOSIS — I35.2 NONRHEUMATIC AORTIC INSUFFICIENCY WITH AORTIC STENOSIS: ICD-10-CM

## 2022-06-29 DIAGNOSIS — I73.9 LEFT LEG CLAUDICATION (H): ICD-10-CM

## 2022-06-29 DIAGNOSIS — J44.9 CHRONIC OBSTRUCTIVE PULMONARY DISEASE, UNSPECIFIED COPD TYPE (H): ICD-10-CM

## 2022-06-29 DIAGNOSIS — E11.42 TYPE 2 DIABETES MELLITUS WITH DIABETIC POLYNEUROPATHY, WITHOUT LONG-TERM CURRENT USE OF INSULIN (H): ICD-10-CM

## 2022-06-29 DIAGNOSIS — R07.89 ATYPICAL CHEST PAIN: ICD-10-CM

## 2022-06-29 DIAGNOSIS — Z00.00 ENCOUNTER FOR MEDICARE ANNUAL WELLNESS EXAM: Primary | ICD-10-CM

## 2022-06-29 LAB
ANION GAP SERPL CALCULATED.3IONS-SCNC: 9 MMOL/L (ref 3–14)
BUN SERPL-MCNC: 22 MG/DL (ref 7–30)
CALCIUM SERPL-MCNC: 10.3 MG/DL (ref 8.5–10.1)
CHLORIDE BLD-SCNC: 99 MMOL/L (ref 94–109)
CHOLEST SERPL-MCNC: 142 MG/DL
CO2 SERPL-SCNC: 29 MMOL/L (ref 20–32)
CREAT SERPL-MCNC: 1.11 MG/DL (ref 0.66–1.25)
CREAT UR-MCNC: 86 MG/DL
FASTING STATUS PATIENT QL REPORTED: YES
GFR SERPL CREATININE-BSD FRML MDRD: 69 ML/MIN/1.73M2
GLUCOSE BLD-MCNC: 165 MG/DL (ref 70–99)
HDLC SERPL-MCNC: 40 MG/DL
LDLC SERPL CALC-MCNC: 60 MG/DL
MICROALBUMIN UR-MCNC: 16 MG/L
MICROALBUMIN/CREAT UR: 18.6 MG/G CR (ref 0–17)
NONHDLC SERPL-MCNC: 102 MG/DL
POTASSIUM BLD-SCNC: 4.1 MMOL/L (ref 3.4–5.3)
SODIUM SERPL-SCNC: 137 MMOL/L (ref 133–144)
TRIGL SERPL-MCNC: 212 MG/DL

## 2022-06-29 PROCEDURE — 99397 PER PM REEVAL EST PAT 65+ YR: CPT | Performed by: FAMILY MEDICINE

## 2022-06-29 PROCEDURE — 99214 OFFICE O/P EST MOD 30 MIN: CPT | Mod: 25 | Performed by: FAMILY MEDICINE

## 2022-06-29 RX ORDER — NITROGLYCERIN 0.4 MG/1
0.4 TABLET SUBLINGUAL EVERY 5 MIN PRN
Qty: 25 TABLET | Refills: 0 | Status: SHIPPED | OUTPATIENT
Start: 2022-06-29 | End: 2022-07-28

## 2022-06-29 ASSESSMENT — ENCOUNTER SYMPTOMS
PARESTHESIAS: 0
PALPITATIONS: 0
HEADACHES: 0
WEAKNESS: 0
ARTHRALGIAS: 0
HEMATOCHEZIA: 0
MYALGIAS: 1
HEARTBURN: 0
JOINT SWELLING: 0
DIZZINESS: 0
NERVOUS/ANXIOUS: 0
DIARRHEA: 0
DYSURIA: 0
SORE THROAT: 0
CONSTIPATION: 0
NAUSEA: 0
ABDOMINAL PAIN: 0
EYE PAIN: 0
COUGH: 0
HEMATURIA: 0
CHILLS: 0
FREQUENCY: 1
FEVER: 0
SHORTNESS OF BREATH: 1

## 2022-06-29 ASSESSMENT — ACTIVITIES OF DAILY LIVING (ADL): CURRENT_FUNCTION: NO ASSISTANCE NEEDED

## 2022-06-29 NOTE — PROGRESS NOTES
"SUBJECTIVE:   Jaylen De La Vega is a 76 year old male who presents for Preventive Visit.    Patient has been advised of split billing requirements and indicates understanding: Yes  Are you in the first 12 months of your Medicare coverage?  No    Healthy Habits:     In general, how would you rate your overall health?  Good    Frequency of exercise:  6-7 days/week    Duration of exercise:  15-30 minutes    Do you usually eat at least 4 servings of fruit and vegetables a day, include whole grains    & fiber and avoid regularly eating high fat or \"junk\" foods?  Yes    Taking medications regularly:  Yes    Medication side effects:  None    Ability to successfully perform activities of daily living:  No assistance needed    Home Safety:  No safety concerns identified    Hearing Impairment:  No hearing concerns    In the past 6 months, have you been bothered by leaking of urine?  No    In general, how would you rate your overall mental or emotional health?  Good      PHQ-2 Total Score: 0    Additional concerns today:  No     Notes left leg pain with walking and specifically walking more aggressively.  Does not have this pain at rest.    Is due to have repeat echocardiogram to follow up on his aortic stenosis, but today also notes that about a month ago he was having episodes of chest pain in the evening that he described as \"tightness\" without radiation which was not associated with shortness of breath.  He chewed a 325 mg ASA with water and reports within 10 minutes each episode resolved.  He has had no further episodes in the past month.    Breathing is stable with Spiriva.  Doesn't recall the last time he had to use albuterol.      Do you feel safe in your environment? Yes    Have you ever done Advance Care Planning? (For example, a Health Directive, POLST, or a discussion with a medical provider or your loved ones about your wishes): Yes, advance care planning is on file.       Fall risk  Fallen 2 or more times in the " past year?: No  Any fall with injury in the past year?: No    Cognitive Screening   1) Repeat 3 items (Leader, Season, Table)    2) Clock draw: NORMAL  3) 3 item recall: Recalls 3 objects  Results: NORMAL clock, 1-2 items recalled: COGNITIVE IMPAIRMENT LESS LIKELY    Mini-CogTM Copyright GERBER Willams. Licensed by the author for use in [facility]; reprinted with permission (arabella@[facility]). All rights reserved.      Do you have sleep apnea, excessive snoring or daytime drowsiness?: no    Reviewed and updated as needed this visit by clinical staff   Tobacco  Allergies  Meds                Reviewed and updated as needed this visit by Provider                   Social History     Tobacco Use     Smoking status: Former Smoker     Packs/day: 0.50     Years: 50.00     Pack years: 25.00     Types: Cigarettes     Start date: 6/14/1962     Quit date: 1/1/2012     Years since quitting: 10.4     Smokeless tobacco: Never Used   Substance Use Topics     Alcohol use: Yes     Comment: 3 per week     If you drink alcohol do you typically have >3 drinks per day or >7 drinks per week? No    Alcohol Use 6/29/2022   Prescreen: >3 drinks/day or >7 drinks/week? No   Prescreen: >3 drinks/day or >7 drinks/week? -     Diabetes Follow-up    How often are you checking your blood sugar? One time daily  What time of day are you checking your blood sugars (select all that apply)?  Before meals  Have you had any blood sugars above 200?  No  Have you had any blood sugars below 70?  Yes - once had one at 65    What symptoms do you notice when your blood sugar is low?  None    What concerns do you have today about your diabetes? None     Do you have any of these symptoms? (Select all that apply)  No numbness or tingling in feet.  No redness, sores or blisters on feet.  No complaints of excessive thirst.  No reports of blurry vision.  No significant changes to weight.      BP Readings from Last 2 Encounters:   06/29/22 118/70   05/06/22  "118/66     Hemoglobin A1C POCT (%)   Date Value   05/26/2021 7.8 (H)   03/15/2021 8.5 (H)     Hemoglobin A1C (%)   Date Value   06/22/2022 7.6 (H)   02/01/2022 7.4 (H)     LDL Cholesterol Calculated (mg/dL)   Date Value   05/26/2021 48   06/12/2020 76                 Current providers sharing in care for this patient include:   Patient Care Team:  Tj Verma Jr., MD as PCP - General (Family Practice)  Stephie Ruiz, RN as Diabetes Educator (Diabetes Education)  Nayana Jimenez RN as Diabetes Educator (Diabetes Education)  Tj Verma Jr., MD as Assigned PCP    The following health maintenance items are reviewed in Epic and correct as of today:  Health Maintenance Due   Topic Date Due     BMP  05/26/2022     LIPID  05/26/2022     MICROALBUMIN  05/26/2022     DIABETIC FOOT EXAM  05/26/2022     ECHO COMPLETE  07/18/2022               Review of Systems   Constitutional: Negative for chills and fever.   HENT: Positive for congestion. Negative for ear pain, hearing loss and sore throat.    Eyes: Negative for pain and visual disturbance.   Respiratory: Positive for shortness of breath. Negative for cough.    Cardiovascular: Positive for chest pain. Negative for palpitations and peripheral edema.   Gastrointestinal: Negative for abdominal pain, constipation, diarrhea, heartburn, hematochezia and nausea.   Genitourinary: Positive for frequency and impotence. Negative for dysuria, genital sores, hematuria, penile discharge and urgency.   Musculoskeletal: Positive for myalgias. Negative for arthralgias and joint swelling.   Skin: Negative for rash.   Neurological: Negative for dizziness, weakness, headaches and paresthesias.   Psychiatric/Behavioral: Negative for mood changes. The patient is not nervous/anxious.             OBJECTIVE:   /70 (BP Location: Right arm, Cuff Size: Adult Regular)   Pulse 105   Temp 97  F (36.1  C) (Tympanic)   Ht 1.803 m (5' 11\")   Wt 78.5 kg (173 lb)   SpO2 92%   BMI " "24.13 kg/m   Estimated body mass index is 24.13 kg/m  as calculated from the following:    Height as of this encounter: 1.803 m (5' 11\").    Weight as of this encounter: 78.5 kg (173 lb).  Physical Exam  GENERAL: healthy, alert and no distress  EYES: Eyes grossly normal to inspection, PERRL and conjunctivae and sclerae normal  HENT: ear canals and TM's normal, nose and mouth without ulcers or lesions  NECK: no adenopathy, no asymmetry, masses, or scars and thyroid normal to palpation  RESP: lungs clear to auscultation - no rales, rhonchi or wheezes  CV: regular rates and rhythm, normal S1 S2, no S3 or S4, grade 3/6 systolic murmur heard best over the RUSB, peripheral pulses strong and no peripheral edema  ABDOMEN: soft, nontender, no hepatosplenomegaly, no masses and bowel sounds normal  MS: no gross musculoskeletal defects noted, no edema  SKIN: no suspicious lesions or rashes  NEURO: Normal strength and tone, mentation intact and speech normal  PSYCH: mentation appears normal, affect normal/bright    Diagnostic Test Results:  Labs reviewed in Epic  No results found for this or any previous visit (from the past 24 hour(s)).    ASSESSMENT / PLAN:     Problem List Items Addressed This Visit     Aortic insufficiency with aortic stenosis     Repeat echo in the form of a stress echocardiogram.  Clinically remains asymptomatic.           Relevant Medications    nitroGLYcerin (NITROSTAT) 0.4 MG sublingual tablet    Other Relevant Orders    Echocardiogram Exercise Stress    Chronic obstructive pulmonary disease, unspecified COPD type (H)     Stable on Spiriva as controller medication.  Rarely needing to use albuterol.  Exercising daily.  Spiriva refilled and recheck in one year.           Type 2 diabetes mellitus with diabetic polyneuropathy, without long-term current use of insulin (H)     Diabetes is improving/stable/worsening: unchanged.  Continue current treatment regimen., Regular aerobic exercise. and Reminded to get " "yearly retinal exam.  Diabetes will be reassessed in 6 months.             Other Visit Diagnoses     Encounter for Medicare annual wellness exam    -  Primary    Atypical chest pain        Relevant Orders    Echocardiogram Exercise Stress    Left leg claudication (H)        Relevant Orders    US MARISA Doppler with Exercise Left        Will proceed with stress echo to further evaluate complains of chest pain.  Has been asymptomatic for the past month.  Reminded if he gets another episode of pain that he should take a NTG as outlined on the bottle instead of  mg.  Should call 911 if pain not resolved after 15 minutes.    Concerned leg symptoms may be due to claudication.  Will proceed with an MARISA with exercise to evaluate further.  Consider MRA vs vascular surgery consultation should the MARISA return positive.  ADDENDUM: MARISA is positive on the left leg. Vascular surgery consultation placed.          COUNSELING:  Reviewed preventive health counseling, as reflected in patient instructions       Regular exercise       Healthy diet/nutrition       Vision screening    Estimated body mass index is 24.13 kg/m  as calculated from the following:    Height as of this encounter: 1.803 m (5' 11\").    Weight as of this encounter: 78.5 kg (173 lb).        He reports that he quit smoking about 10 years ago. His smoking use included cigarettes. He started smoking about 60 years ago. He has a 25.00 pack-year smoking history. He has never used smokeless tobacco.      Appropriate preventive services were discussed with this patient, including applicable screening as appropriate for cardiovascular disease, diabetes, osteopenia/osteoporosis, and glaucoma.  As appropriate for age/gender, discussed screening for colorectal cancer, prostate cancer, breast cancer, and cervical cancer. Checklist reviewing preventive services available has been given to the patient.    Reviewed patients plan of care and provided an AVS. The Basic Care Plan " (routine screening as documented in Health Maintenance) for Jaylen meets the Care Plan requirement. This Care Plan has been established and reviewed with the Patient.    Counseling Resources:  ATP IV Guidelines  Pooled Cohorts Equation Calculator  Breast Cancer Risk Calculator  Breast Cancer: Medication to Reduce Risk  FRAX Risk Assessment  ICSI Preventive Guidelines  Dietary Guidelines for Americans, 2010  [employer]'s MyPlate  ASA Prophylaxis  Lung CA Screening    Tj Verma Jr, MD  [facility]    Identified Health Risks:

## 2022-06-29 NOTE — ASSESSMENT & PLAN NOTE
Stable on Spiriva as controller medication.  Rarely needing to use albuterol.  Exercising daily.  Spiriva refilled and recheck in one year.

## 2022-06-29 NOTE — ASSESSMENT & PLAN NOTE
Diabetes is improving/stable/worsening: unchanged.  Continue current treatment regimen., Regular aerobic exercise. and Reminded to get yearly retinal exam.  Diabetes will be reassessed in 6 months.

## 2022-06-29 NOTE — PATIENT INSTRUCTIONS
Patient Education   Personalized Prevention Plan  You are due for the preventive services outlined below.  Your care team is available to assist you in scheduling these services.  If you have already completed any of these items, please share that information with your care team to update in your medical record.  Health Maintenance Due   Topic Date Due     Basic Metabolic Panel  05/26/2022     Cholesterol Lab  05/26/2022     Kidney Microalbumin Urine Test  05/26/2022     Diabetic Foot Exam  05/26/2022     Heart Echocardiogram  07/18/2022

## 2022-07-05 NOTE — RESULT ENCOUNTER NOTE
Mr. De La Vega,    -Cholesterol levels are at your goal levels.  ADVISE: continuing your medication, a regular exercise program with at least 150 minutes of aerobic exercise per week, and eating a low saturated fat/low carbohydrate diet.  Also, you should recheck this fasting cholesterol panel in 12 months.  -Kidney function (GFR) is normal.  -Sodium is normal.  -Potassium is normal.  -Calcium is elevated.  ADVISE: this is likely coming from your hydrochlorothiazide Nik.  It's very mildly elevated and we can safely follow this in another 6-12 months.  -Glucose is elevated due to your diabetes.  -Microalbumin (urine protein) level is elevated. This is suggestive of early damage to your kidneys from high blood pressure and diabetes.  ADVISE: avoiding anti-inflamatory agents such as ibuprofen (Advil, Motrin) or naproxen (Aleve) as much as possible, keeping your blood pressure in a normal range, and continuing your medication (losartan) that helps protect your kidneys.  Also, this should be rechecked in 1 year.     If you have further questions about the interpretation of your labs, [email or web address] is a good website to check out for further information.    Please contact the clinic if you have additional questions.  Thank you.    Sincerely,    Tj Vrema MD

## 2022-07-07 ENCOUNTER — HOSPITAL ENCOUNTER (OUTPATIENT)
Dept: ULTRASOUND IMAGING | Facility: CLINIC | Age: 77
Discharge: HOME | End: 2022-07-07
Attending: FAMILY MEDICINE | Admitting: FAMILY MEDICINE
Payer: COMMERCIAL

## 2022-07-07 DIAGNOSIS — I73.9 LEFT LEG CLAUDICATION (H): ICD-10-CM

## 2022-07-07 PROCEDURE — 93924 LWR XTR VASC STDY BILAT: CPT

## 2022-07-07 NOTE — RESULT ENCOUNTER NOTE
Mr. De La Vega,    As we suspected, the ankle brachial index testing that we did confirms that your left leg symptoms are likely coming from narrowing of the arteries of your left leg.  We need to do some further testing to find out where and the extent of the narrowing.  I've referred you to our vascular surgery clinic for them to help us pick the best imaging study to get this information and then to help us figure out how to best treat that.  They'll be soon be reaching out to you to schedule an appointment     If you have further questions about the interpretation of your labs, [email or web address] is a good website to check out for further information.    Please contact the clinic if you have additional questions.  Thank you.    Sincerely,    Tj Verma MD

## 2022-07-08 ENCOUNTER — TELEPHONE (OUTPATIENT)
Dept: OTHER | Facility: CLINIC | Age: 77
End: 2022-07-08

## 2022-07-08 NOTE — TELEPHONE ENCOUNTER
Pt referred to [facility] by Tj Verma Jr., MD for Leg claudication, PAD.     MARISA 7/8/22 in Epic.     Pt needs to be scheduled for new pt in clinic consult with IR.  Will route to scheduling to coordinate an appointment at next nearest available.    Appt note: new pt ref by Tj Verma Jr., MD for Leg claudication, PAD (MARISA in Epic).     LINDA Juarez, RN  [facility] [place]

## 2022-07-08 NOTE — TELEPHONE ENCOUNTER
Patient did not want first available. Agreed to the following visit:      Future Appointments   Date Time Provider Department Center   8/15/2022  1:40 PM Ramón Orellana MD [facility]

## 2022-07-14 NOTE — TELEPHONE ENCOUNTER
[facility]    Who is the name of the provider?:         What is the location you see this provider at?:      Reason for call:  Spoke with patient and he would like to be seen earlier.    He is having a little more discomfort in his left leg.  He hasn't been able to walk as much as he is getting pain up to his hips without much walking.      Please call patient and advise next steps.    Can we leave a detailed message on this number?  YES

## 2022-07-15 NOTE — TELEPHONE ENCOUNTER
VIPUL for patient to call.    Dr. Taylor would be able to see him on Monday, July 18 @ 4:30pm.  He may have a wait since it would be double booked.    If this works for him we can reschedule his appointment.

## 2022-07-19 ENCOUNTER — TELEPHONE (OUTPATIENT)
Dept: CARDIOLOGY | Facility: CLINIC | Age: 77
End: 2022-07-19

## 2022-07-19 ENCOUNTER — HOSPITAL ENCOUNTER (OUTPATIENT)
Dept: CARDIOLOGY | Facility: CLINIC | Age: 77
Discharge: HOME | End: 2022-07-19
Attending: FAMILY MEDICINE | Admitting: FAMILY MEDICINE
Payer: COMMERCIAL

## 2022-07-19 DIAGNOSIS — R07.89 ATYPICAL CHEST PAIN: ICD-10-CM

## 2022-07-19 DIAGNOSIS — I35.2 NONRHEUMATIC AORTIC INSUFFICIENCY WITH AORTIC STENOSIS: ICD-10-CM

## 2022-07-19 DIAGNOSIS — I35.8 NON-RHEUMATIC AORTIC SCLEROSIS: Primary | ICD-10-CM

## 2022-07-19 PROCEDURE — 93321 DOPPLER ECHO F-UP/LMTD STD: CPT | Mod: 26 | Performed by: INTERNAL MEDICINE

## 2022-07-19 PROCEDURE — 250N000011 HC RX IP 250 OP 636

## 2022-07-19 PROCEDURE — 93325 DOPPLER ECHO COLOR FLOW MAPG: CPT | Mod: 26 | Performed by: INTERNAL MEDICINE

## 2022-07-19 PROCEDURE — 93325 DOPPLER ECHO COLOR FLOW MAPG: CPT | Mod: TC

## 2022-07-19 PROCEDURE — 93018 CV STRESS TEST I&R ONLY: CPT | Performed by: INTERNAL MEDICINE

## 2022-07-19 PROCEDURE — 93016 CV STRESS TEST SUPVJ ONLY: CPT | Performed by: INTERNAL MEDICINE

## 2022-07-19 PROCEDURE — 255N000002 HC RX 255 OP 636: Performed by: FAMILY MEDICINE

## 2022-07-19 PROCEDURE — 93350 STRESS TTE ONLY: CPT | Mod: 26 | Performed by: INTERNAL MEDICINE

## 2022-07-19 PROCEDURE — 250N000009 HC RX 250: Performed by: INTERNAL MEDICINE

## 2022-07-19 RX ORDER — ATROPINE SULFATE 0.1 MG/ML
.2-2 INJECTION INTRAVENOUS
Status: ACTIVE | OUTPATIENT
Start: 2022-07-19 | End: 2022-07-19

## 2022-07-19 RX ORDER — METOPROLOL TARTRATE 1 MG/ML
1-20 INJECTION, SOLUTION INTRAVENOUS
Status: ACTIVE | OUTPATIENT
Start: 2022-07-19 | End: 2022-07-19

## 2022-07-19 RX ORDER — DOBUTAMINE HYDROCHLORIDE 200 MG/100ML
INJECTION INTRAVENOUS
Status: COMPLETED
Start: 2022-07-19 | End: 2022-07-19

## 2022-07-19 RX ORDER — DOBUTAMINE HYDROCHLORIDE 200 MG/100ML
10-50 INJECTION INTRAVENOUS CONTINUOUS
Status: ACTIVE | OUTPATIENT
Start: 2022-07-19 | End: 2022-07-19

## 2022-07-19 RX ORDER — SODIUM CHLORIDE 9 MG/ML
INJECTION, SOLUTION INTRAVENOUS CONTINUOUS
Status: ACTIVE | OUTPATIENT
Start: 2022-07-19 | End: 2022-07-19

## 2022-07-19 RX ADMIN — METOPROLOL TARTRATE 2 MG: 5 INJECTION INTRAVENOUS at 08:44

## 2022-07-19 RX ADMIN — DOBUTAMINE HYDROCHLORIDE 10 MCG/KG/MIN: 200 INJECTION INTRAVENOUS at 08:30

## 2022-07-19 RX ADMIN — HUMAN ALBUMIN MICROSPHERES AND PERFLUTREN 3 ML: 10; .22 INJECTION, SOLUTION INTRAVENOUS at 09:05

## 2022-07-19 RX ADMIN — METOPROLOL TARTRATE 1 MG: 5 INJECTION INTRAVENOUS at 08:49

## 2022-07-19 NOTE — PROGRESS NOTES
Test complete without any complications.  Patient BP and HR at baseline, no pain or pressure. Zina Serrano RN

## 2022-07-19 NOTE — PROGRESS NOTES
Patient here for Dobutamine stress echo ordered by primary physician, Dr GERBER Verma.  According to note, ordered to flollow up Aortic Stenosis but medication orders not entered.  After discussion with Dr IZZY Elam (cardiology rounder) pt has aortic sclerosis not stenosis.  Proceed with dobutamine test under normal provision.  Zina Serrano RN

## 2022-07-19 NOTE — RESULT ENCOUNTER NOTE
Mr. De La Vega,    Your stress test is normal.    If you have further questions about the interpretation of your labs, [email or web address] is a good website to check out for further information.    Please contact the clinic if you have additional questions.  Thank you.    Sincerely,    Tj Verma MD

## 2022-07-25 ENCOUNTER — TRANSFERRED RECORDS (OUTPATIENT)
Dept: HEALTH INFORMATION MANAGEMENT | Facility: CLINIC | Age: 77
End: 2022-07-25

## 2022-07-25 LAB — RETINOPATHY: NEGATIVE

## 2022-07-26 DIAGNOSIS — I35.2 NONRHEUMATIC AORTIC INSUFFICIENCY WITH AORTIC STENOSIS: ICD-10-CM

## 2022-07-28 RX ORDER — NITROGLYCERIN 0.4 MG/1
TABLET SUBLINGUAL
Qty: 25 TABLET | Refills: 0 | Status: SHIPPED | OUTPATIENT
Start: 2022-07-28 | End: 2023-12-20

## 2022-07-28 NOTE — TELEPHONE ENCOUNTER
Chart reviewed.  Rx sent to pt's preferred pharmacy.       [facility]  [facility] [id number] [place] [facility] [place] [address] [address]    Tj Verma MD

## 2022-07-28 NOTE — TELEPHONE ENCOUNTER
Routing refill request to provider for review/approval because:  Drug does not pass the [facility] refill protocol      Nitrates Failed 07/26/2022 12:36 AM   Protocol Details  Sublingual nitro order needs review        Last filled 6/29/22 for 25 tablets     Thank you!  Asya BOYD RN   [facility] Triage

## 2022-08-15 ENCOUNTER — OFFICE VISIT (OUTPATIENT)
Dept: OTHER | Facility: CLINIC | Age: 77
End: 2022-08-15
Attending: FAMILY MEDICINE
Payer: COMMERCIAL

## 2022-08-15 VITALS — SYSTOLIC BLOOD PRESSURE: 136 MMHG | DIASTOLIC BLOOD PRESSURE: 70 MMHG | HEART RATE: 98 BPM

## 2022-08-15 DIAGNOSIS — I73.9 LEFT LEG CLAUDICATION (H): Primary | ICD-10-CM

## 2022-08-15 DIAGNOSIS — I73.9 LEFT LEG CLAUDICATION (H): ICD-10-CM

## 2022-08-15 PROCEDURE — G0463 HOSPITAL OUTPT CLINIC VISIT: HCPCS

## 2022-08-15 NOTE — PROGRESS NOTES
[facility]        Patient is here for a consult to discuss Peripheral artery disease (PAD).     Pt is currently taking Aspirin and Statin.    /70 (BP Location: Right arm, Patient Position: Chair, Cuff Size: Adult Regular)   Pulse 98     The provider has been notified that the patient has no concerns.     Questions patient would like addressed today are: N/A.    Refills are needed: N/A    Has homecare services and agency name:  Alicia Dsouza MA
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
## 2022-08-23 ENCOUNTER — HOSPITAL ENCOUNTER (OUTPATIENT)
Dept: CT IMAGING | Facility: CLINIC | Age: 77
Discharge: HOME | End: 2022-08-23
Attending: RADIOLOGY | Admitting: RADIOLOGY
Payer: COMMERCIAL

## 2022-08-23 DIAGNOSIS — I73.9 LEFT LEG CLAUDICATION (H): ICD-10-CM

## 2022-08-23 LAB
CREAT BLD-MCNC: 1.1 MG/DL (ref 0.7–1.3)
GFR SERPL CREATININE-BSD FRML MDRD: >60 ML/MIN/1.73M2

## 2022-08-23 PROCEDURE — 250N000011 HC RX IP 250 OP 636: Performed by: RADIOLOGY

## 2022-08-23 PROCEDURE — 75635 CT ANGIO ABDOMINAL ARTERIES: CPT

## 2022-08-23 PROCEDURE — 82565 ASSAY OF CREATININE: CPT

## 2022-08-23 PROCEDURE — 250N000009 HC RX 250: Performed by: RADIOLOGY

## 2022-08-23 RX ORDER — IOPAMIDOL 755 MG/ML
500 INJECTION, SOLUTION INTRAVASCULAR ONCE
Status: COMPLETED | OUTPATIENT
Start: 2022-08-23 | End: 2022-08-23

## 2022-08-23 RX ADMIN — IOPAMIDOL 100 ML: 755 INJECTION, SOLUTION INTRAVENOUS at 09:06

## 2022-08-23 RX ADMIN — SODIUM CHLORIDE 80 ML: 9 INJECTION, SOLUTION INTRAVENOUS at 09:06

## 2022-08-25 ENCOUNTER — TELEPHONE (OUTPATIENT)
Dept: OTHER | Facility: CLINIC | Age: 77
End: 2022-08-25

## 2022-08-25 NOTE — TELEPHONE ENCOUNTER
Spoke with, discussed will review with Dr. Orellana on Monday and contact him with a plan and findings of CTA.  Patito Kelly RN  IR nurse clinician  [phone]

## 2022-08-29 DIAGNOSIS — I70.212 ATHEROSCLEROSIS OF NATIVE ARTERIES OF EXTREMITIES WITH INTERMITTENT CLAUDICATION, LEFT LEG (H): ICD-10-CM

## 2022-08-29 DIAGNOSIS — I73.9 LEFT LEG CLAUDICATION (H): Primary | ICD-10-CM

## 2022-09-01 ENCOUNTER — OFFICE VISIT (OUTPATIENT)
Dept: FAMILY MEDICINE | Facility: CLINIC | Age: 77
End: 2022-09-01
Payer: COMMERCIAL

## 2022-09-01 VITALS
HEART RATE: 101 BPM | BODY MASS INDEX: 23.95 KG/M2 | OXYGEN SATURATION: 93 % | HEIGHT: 71 IN | WEIGHT: 171.1 LBS | TEMPERATURE: 97.6 F | SYSTOLIC BLOOD PRESSURE: 119 MMHG | DIASTOLIC BLOOD PRESSURE: 69 MMHG

## 2022-09-01 DIAGNOSIS — J44.9 CHRONIC OBSTRUCTIVE PULMONARY DISEASE, UNSPECIFIED COPD TYPE (H): ICD-10-CM

## 2022-09-01 DIAGNOSIS — I10 ESSENTIAL HYPERTENSION: ICD-10-CM

## 2022-09-01 DIAGNOSIS — Z01.818 PREOP GENERAL PHYSICAL EXAM: Primary | ICD-10-CM

## 2022-09-01 DIAGNOSIS — E11.42 TYPE 2 DIABETES MELLITUS WITH DIABETIC POLYNEUROPATHY, WITHOUT LONG-TERM CURRENT USE OF INSULIN (H): ICD-10-CM

## 2022-09-01 DIAGNOSIS — I73.9 LEFT LEG CLAUDICATION (H): ICD-10-CM

## 2022-09-01 LAB
ANION GAP SERPL CALCULATED.3IONS-SCNC: 10 MMOL/L (ref 3–14)
BUN SERPL-MCNC: 26 MG/DL (ref 7–30)
CALCIUM SERPL-MCNC: 10.6 MG/DL (ref 8.5–10.1)
CHLORIDE BLD-SCNC: 96 MMOL/L (ref 94–109)
CO2 SERPL-SCNC: 27 MMOL/L (ref 20–32)
CREAT SERPL-MCNC: 1.13 MG/DL (ref 0.66–1.25)
ERYTHROCYTE [DISTWIDTH] IN BLOOD BY AUTOMATED COUNT: 13.5 % (ref 10–15)
GFR SERPL CREATININE-BSD FRML MDRD: 68 ML/MIN/1.73M2
GLUCOSE BLD-MCNC: 190 MG/DL (ref 70–99)
HCT VFR BLD AUTO: 52 % (ref 40–53)
HGB BLD-MCNC: 17.4 G/DL (ref 13.3–17.7)
MCH RBC QN AUTO: 29.3 PG (ref 26.5–33)
MCHC RBC AUTO-ENTMCNC: 33.5 G/DL (ref 31.5–36.5)
MCV RBC AUTO: 88 FL (ref 78–100)
PLATELET # BLD AUTO: 195 10E3/UL (ref 150–450)
POTASSIUM BLD-SCNC: 4 MMOL/L (ref 3.4–5.3)
RBC # BLD AUTO: 5.93 10E6/UL (ref 4.4–5.9)
SODIUM SERPL-SCNC: 133 MMOL/L (ref 133–144)
WBC # BLD AUTO: 6.4 10E3/UL (ref 4–11)

## 2022-09-01 PROCEDURE — 80048 BASIC METABOLIC PNL TOTAL CA: CPT | Performed by: FAMILY MEDICINE

## 2022-09-01 PROCEDURE — 36415 COLL VENOUS BLD VENIPUNCTURE: CPT | Performed by: FAMILY MEDICINE

## 2022-09-01 PROCEDURE — 85027 COMPLETE CBC AUTOMATED: CPT | Performed by: FAMILY MEDICINE

## 2022-09-01 PROCEDURE — 93000 ELECTROCARDIOGRAM COMPLETE: CPT | Performed by: FAMILY MEDICINE

## 2022-09-01 PROCEDURE — 99215 OFFICE O/P EST HI 40 MIN: CPT | Performed by: FAMILY MEDICINE
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
## 2022-09-01 NOTE — PATIENT INSTRUCTIONS
Preparing for Your Surgery  Getting started  A nurse will call you to review your health history and instructions. They will give you an arrival time based on your scheduled surgery time. Please be ready to share:    Your doctor's clinic name and phone number    Your medical, surgical and anesthesia history    A list of allergies and sensitivities    A list of medicines, including herbal treatments and over-the-counter drugs    Whether the patient has a legal guardian (ask how to send us the papers in advance)  Please tell us if you're pregnant--or if there's any chance you might be pregnant. Some surgeries may injure a fetus (unborn baby), so they require a pregnancy test. Surgeries that are safe for a fetus don't always need a test, and you can choose whether to have one.   If you have a child who's having surgery, please ask for a copy of Preparing for Your Child's Surgery.    Preparing for surgery    Within 30 days of surgery: Have a pre-op exam (sometimes called an H&P, or History and Physical). This can be done at a clinic or pre-operative center.  ? If you're having a , you may not need this exam. Talk to your care team.    At your pre-op exam, talk to your care team about all medicines you take. If you need to stop any medicines before surgery, ask when to start taking them again.  ? We do this for your safety. Many medicines can make you bleed too much during surgery. Some change how well surgery (anesthesia) drugs work.    Call your insurance company to let them know you're having surgery. (If you don't have insurance, call [phone].)    Call your clinic if there's any change in your health. This includes signs of a cold or flu (sore throat, runny nose, cough, rash, fever). It also includes a scrape or scratch near the surgery site.    If you have questions on the day of surgery, call your hospital or surgery center.  COVID testing  You may need to be tested for COVID-19 before having  surgery. If so, we will give you instructions.  Eating and drinking guidelines  For your safety: Unless your surgeon tells you otherwise, follow the guidelines below.    Eat and drink as usual until 8 hours before surgery. After that, no food or milk.    Drink clear liquids until 2 hours before surgery. These are liquids you can see through, like water, Gatorade and Propel Water. You may also have black coffee and tea (no cream or milk).    Nothing by mouth within 2 hours of surgery. This includes gum, candy and breath mints.    If you drink alcohol: Stop drinking it the night before surgery.    If your care team tells you to take medicine on the morning of surgery, it's okay to take it with a sip of water.  Preventing infection    Shower or bathe the night before and morning of your surgery. Follow the instructions your clinic gave you. (If no instructions, use regular soap.)    Don't shave or clip hair near your surgery site. We'll remove the hair if needed.    Don't smoke or vape the morning of surgery. You may chew nicotine gum up to 2 hours before surgery. A nicotine patch is okay.  ? Note: Some surgeries require you to completely quit smoking and nicotine. Check with your surgeon.    Your care team will make every effort to keep you safe from infection. We will:  ? Clean our hands often with soap and water (or an alcohol-based hand rub).  ? Clean the skin at your surgery site with a special soap that kills germs.  ? Give you a special gown to keep you warm. (Cold raises the risk of infection.)  ? Wear special hair covers, masks, gowns and gloves during surgery.  ? Give antibiotic medicine, if prescribed. Not all surgeries need antibiotics.  What to bring on the day of surgery    Photo ID and insurance card    Copy of your health care directive, if you have one    Glasses and hearing aides (bring cases)  ? You can't wear contacts during surgery    Inhaler and eye drops, if you use them (tell us about these when  you arrive)    CPAP machine or breathing device, if you use them    A few personal items, if spending the night    If you have . . .  ? A pacemaker, ICD (cardiac defibrillator) or other implant: Bring the ID card.  ? An implanted stimulator: Bring the remote control.  ? A legal guardian: Bring a copy of the certified (court-stamped) guardianship papers.  Please remove any jewelry, including body piercings. Leave jewelry and other valuables at home.  If you're going home the day of surgery    You must have a responsible adult drive you home. They should stay with you overnight as well.    If you don't have someone to stay with you, and you aren't safe to go home alone, we may keep you overnight. Insurance often won't pay for this.  After surgery  If it's hard to control your pain or you need more pain medicine, please call your surgeon's office.  Questions?   If you have any questions for your care team, list them here: _________________________________________________________________________________________________________________________________________________________________________ ____________________________________ ____________________________________ ____________________________________  For informational purposes only. Not to replace the advice of your health care provider. Copyright   2003, 2019 [facility]. All rights reserved. Clinically reviewed by Eliana Forte MD. [employer] [id number] - REV 07/21.

## 2022-09-09 NOTE — RESULT ENCOUNTER NOTE
Dear Nik,    Here is a summary of your recent test results:  -Normal red blood cell (hgb) levels, normal white blood cell count and normal platelet levels.  -Kidney function (GFR) is normal.  -Sodium is normal.  -Potassium is normal.  -Calcium is normal.  -Glucose is elevated due to your diabetes.    For additional lab test information, www.[employer].com is a very good reference.    In addition, here is a list of due or overdue Health Maintenance reminders:  Flu Vaccine(1) due on 09/01/2022    Please call us at [phone] (or use [employer]) to address the above recommendations if needed.           Thank you very much for trusting me and [facility].     Have a peaceful day.    Healthy regards,  Roberto Engel MD

## 2022-09-16 RX ORDER — SODIUM CHLORIDE 9 MG/ML
INJECTION, SOLUTION INTRAVENOUS CONTINUOUS
Status: CANCELLED | OUTPATIENT
Start: 2022-09-16

## 2022-09-17 DIAGNOSIS — I10 ESSENTIAL HYPERTENSION: ICD-10-CM

## 2022-09-19 ENCOUNTER — HOSPITAL ENCOUNTER (OUTPATIENT)
Dept: INTERVENTIONAL RADIOLOGY/VASCULAR | Facility: CLINIC | Age: 77
Discharge: HOME | End: 2022-09-19
Attending: RADIOLOGY | Admitting: RADIOLOGY
Payer: COMMERCIAL

## 2022-09-19 ENCOUNTER — HOSPITAL ENCOUNTER (OUTPATIENT)
Facility: CLINIC | Age: 77
Discharge: HOME | End: 2022-09-19
Admitting: RADIOLOGY
Payer: COMMERCIAL

## 2022-09-19 VITALS
OXYGEN SATURATION: 98 % | RESPIRATION RATE: 14 BRPM | DIASTOLIC BLOOD PRESSURE: 86 MMHG | SYSTOLIC BLOOD PRESSURE: 145 MMHG | HEART RATE: 85 BPM

## 2022-09-19 VITALS
SYSTOLIC BLOOD PRESSURE: 141 MMHG | TEMPERATURE: 98.4 F | RESPIRATION RATE: 7 BRPM | OXYGEN SATURATION: 94 % | BODY MASS INDEX: 24.08 KG/M2 | HEIGHT: 71 IN | HEART RATE: 78 BPM | DIASTOLIC BLOOD PRESSURE: 87 MMHG | WEIGHT: 172 LBS

## 2022-09-19 DIAGNOSIS — I73.9 LEFT LEG CLAUDICATION (H): Primary | ICD-10-CM

## 2022-09-19 DIAGNOSIS — I70.212 ATHEROSCLEROSIS OF NATIVE ARTERIES OF EXTREMITIES WITH INTERMITTENT CLAUDICATION, LEFT LEG (H): ICD-10-CM

## 2022-09-19 DIAGNOSIS — I73.9 LEFT LEG CLAUDICATION (H): ICD-10-CM

## 2022-09-19 LAB
GLUCOSE BLDC GLUCOMTR-MCNC: 147 MG/DL (ref 70–99)
INR PPP: 1.05 (ref 0.85–1.15)
PLATELET # BLD AUTO: 190 10E3/UL (ref 150–450)

## 2022-09-19 PROCEDURE — 250N000011 HC RX IP 250 OP 636: Performed by: RADIOLOGY

## 2022-09-19 PROCEDURE — 272N000302 HC DEVICE INFLATION CR5

## 2022-09-19 PROCEDURE — 999N000012 HC STATISTIC ANGIOGRAM, STENT, VERTEBRO PLASTY

## 2022-09-19 PROCEDURE — 272N000566 HC SHEATH CR3

## 2022-09-19 PROCEDURE — 258N000003 HC RX IP 258 OP 636: Performed by: PHYSICIAN ASSISTANT

## 2022-09-19 PROCEDURE — 82962 GLUCOSE BLOOD TEST: CPT | Mod: GZ

## 2022-09-19 PROCEDURE — 36415 COLL VENOUS BLD VENIPUNCTURE: CPT | Performed by: PHYSICIAN ASSISTANT

## 2022-09-19 PROCEDURE — 250N000009 HC RX 250: Performed by: PHYSICIAN ASSISTANT

## 2022-09-19 PROCEDURE — 272N000570 HC SHEATH CR7

## 2022-09-19 PROCEDURE — 272N000196 HC ACCESSORY CR5

## 2022-09-19 PROCEDURE — 85610 PROTHROMBIN TIME: CPT | Performed by: PHYSICIAN ASSISTANT

## 2022-09-19 PROCEDURE — C1769 GUIDE WIRE: HCPCS

## 2022-09-19 PROCEDURE — 272N000116 HC CATH CR1

## 2022-09-19 PROCEDURE — 255N000002 HC RX 255 OP 636: Performed by: RADIOLOGY

## 2022-09-19 PROCEDURE — 85049 AUTOMATED PLATELET COUNT: CPT | Performed by: PHYSICIAN ASSISTANT

## 2022-09-19 PROCEDURE — 250N000011 HC RX IP 250 OP 636: Performed by: PHYSICIAN ASSISTANT

## 2022-09-19 PROCEDURE — 272N000124 HC CATH CR11

## 2022-09-19 PROCEDURE — 99152 MOD SED SAME PHYS/QHP 5/>YRS: CPT

## 2022-09-19 PROCEDURE — C1760 CLOSURE DEV, VASC: HCPCS

## 2022-09-19 RX ORDER — HEPARIN SODIUM 1000 [USP'U]/ML
4000 INJECTION, SOLUTION INTRAVENOUS; SUBCUTANEOUS
Status: COMPLETED | OUTPATIENT
Start: 2022-09-19 | End: 2022-09-19

## 2022-09-19 RX ORDER — HEPARIN SODIUM 200 [USP'U]/100ML
1 INJECTION, SOLUTION INTRAVENOUS CONTINUOUS PRN
Status: DISCONTINUED | OUTPATIENT
Start: 2022-09-19 | End: 2022-09-20 | Stop reason: HOSPADM

## 2022-09-19 RX ORDER — NALOXONE HYDROCHLORIDE 0.4 MG/ML
0.4 INJECTION, SOLUTION INTRAMUSCULAR; INTRAVENOUS; SUBCUTANEOUS
Status: DISCONTINUED | OUTPATIENT
Start: 2022-09-19 | End: 2022-09-20 | Stop reason: HOSPADM

## 2022-09-19 RX ORDER — LOSARTAN POTASSIUM 100 MG/1
TABLET ORAL
Qty: 90 TABLET | Refills: 1 | Status: SHIPPED | OUTPATIENT
Start: 2022-09-19 | End: 2023-05-24

## 2022-09-19 RX ORDER — CLOPIDOGREL BISULFATE 75 MG/1
75 TABLET ORAL DAILY
Qty: 90 TABLET | Refills: 4 | Status: SHIPPED | OUTPATIENT
Start: 2022-09-19 | End: 2023-03-20

## 2022-09-19 RX ORDER — CLOPIDOGREL BISULFATE 75 MG/1
75 TABLET ORAL DAILY
Qty: 90 TABLET | Refills: 1 | Status: SHIPPED | OUTPATIENT
Start: 2022-09-19 | End: 2023-03-15

## 2022-09-19 RX ORDER — CLOPIDOGREL 300 MG/1
300 TABLET, FILM COATED ORAL ONCE
Status: DISCONTINUED | OUTPATIENT
Start: 2022-09-19 | End: 2022-09-19

## 2022-09-19 RX ORDER — FLUMAZENIL 0.1 MG/ML
0.2 INJECTION, SOLUTION INTRAVENOUS
Status: DISCONTINUED | OUTPATIENT
Start: 2022-09-19 | End: 2022-09-20 | Stop reason: HOSPADM

## 2022-09-19 RX ORDER — NALOXONE HYDROCHLORIDE 0.4 MG/ML
0.2 INJECTION, SOLUTION INTRAMUSCULAR; INTRAVENOUS; SUBCUTANEOUS
Status: DISCONTINUED | OUTPATIENT
Start: 2022-09-19 | End: 2022-09-20 | Stop reason: HOSPADM

## 2022-09-19 RX ORDER — SODIUM CHLORIDE 9 MG/ML
INJECTION, SOLUTION INTRAVENOUS CONTINUOUS
Status: DISCONTINUED | OUTPATIENT
Start: 2022-09-19 | End: 2022-09-19 | Stop reason: HOSPADM

## 2022-09-19 RX ORDER — FENTANYL CITRATE 50 UG/ML
25-50 INJECTION, SOLUTION INTRAMUSCULAR; INTRAVENOUS EVERY 5 MIN PRN
Status: DISCONTINUED | OUTPATIENT
Start: 2022-09-19 | End: 2022-09-20 | Stop reason: HOSPADM

## 2022-09-19 RX ORDER — IODIXANOL 320 MG/ML
150 INJECTION, SOLUTION INTRAVASCULAR ONCE
Status: COMPLETED | OUTPATIENT
Start: 2022-09-19 | End: 2022-09-19

## 2022-09-19 RX ADMIN — HEPARIN SODIUM 4000 UNITS: 1000 INJECTION INTRAVENOUS; SUBCUTANEOUS at 08:20

## 2022-09-19 RX ADMIN — IODIXANOL 49 ML: 320 INJECTION, SOLUTION INTRAVASCULAR at 08:52

## 2022-09-19 RX ADMIN — HEPARIN SODIUM IN SODIUM CHLORIDE 2 BAG: 200 INJECTION INTRAVENOUS at 08:09

## 2022-09-19 RX ADMIN — FENTANYL CITRATE 50 MCG: 50 INJECTION, SOLUTION INTRAMUSCULAR; INTRAVENOUS at 07:57

## 2022-09-19 RX ADMIN — MIDAZOLAM HYDROCHLORIDE 0.5 MG: 1 INJECTION, SOLUTION INTRAMUSCULAR; INTRAVENOUS at 08:40

## 2022-09-19 RX ADMIN — MIDAZOLAM HYDROCHLORIDE 1 MG: 1 INJECTION, SOLUTION INTRAMUSCULAR; INTRAVENOUS at 07:57

## 2022-09-19 RX ADMIN — MIDAZOLAM HYDROCHLORIDE 0.5 MG: 1 INJECTION, SOLUTION INTRAMUSCULAR; INTRAVENOUS at 08:22

## 2022-09-19 RX ADMIN — FENTANYL CITRATE 25 MCG: 50 INJECTION, SOLUTION INTRAMUSCULAR; INTRAVENOUS at 08:22

## 2022-09-19 RX ADMIN — FENTANYL CITRATE 25 MCG: 50 INJECTION, SOLUTION INTRAMUSCULAR; INTRAVENOUS at 08:40

## 2022-09-19 RX ADMIN — SODIUM CHLORIDE: 9 INJECTION, SOLUTION INTRAVENOUS at 06:36

## 2022-09-19 RX ADMIN — LIDOCAINE HYDROCHLORIDE 10 ML: 10 INJECTION, SOLUTION INFILTRATION; PERINEURAL at 08:05

## 2022-09-19 ASSESSMENT — ACTIVITIES OF DAILY LIVING (ADL)
ADLS_ACUITY_SCORE: 35

## 2022-09-19 NOTE — PRE-PROCEDURE
GENERAL PRE-PROCEDURE:   Procedure:  Left lower extremity angiogram, possible right lower extremity angiogram with possible intervention with moderate sedation  Date/Time:  9/19/2022 7:14 AM    Written consent obtained?: Yes    Risks and benefits: Risks, benefits and alternatives were discussed    Consent given by:  Patient  Patient states understanding of procedure being performed: Yes    Patient's understanding of procedure matches consent: Yes    Procedure consent matches procedure scheduled: Yes    Expected level of sedation:  Moderate  Appropriately NPO:  Yes  ASA Class:  2  Mallampati  :  Grade 2- soft palate, base of uvula, tonsillar pillars, and portion of posterior pharyngeal wall visible  Lungs:  Lungs clear with good breath sounds bilaterally  Heart:  Normal heart sounds and rate  History & Physical reviewed:  History and physical reviewed and no updates needed  Statement of review:  I have reviewed the lab findings, diagnostic data, medications, and the plan for sedation

## 2022-09-19 NOTE — PROGRESS NOTES
Patient will  plavix prescription at the [facility] pharmacy at [facility] in [place].

## 2022-09-19 NOTE — PROGRESS NOTES
Care Suites Admission Nursing Note    Patient Information  Name: Jaylen De La Vega  Age: 76 year old  Reason for admission: leg angiogram   Care Suites arrival time: 0630    Patient Admission/Assessment   Pre-procedure assessment complete: Yes  If abnormal assessment/labs, provider notified: N/A  NPO: Yes  Medications held per instructions/orders: Yes  Consent: deferred  If applicable, pregnancy test status: deferred  Patient oriented to room: Yes  Education/questions answered: Yes  Plan/other: angiogram    Discharge Planning  Discharge name/phone number: darvin  [phone]  Overnight post sedation caregiver: darvin   Discharge location: home    Loy Dietrich RN

## 2022-09-19 NOTE — TELEPHONE ENCOUNTER
Prescription approved per [facility] Refill Protocol.    Rosi Hammer RN  [facility]

## 2022-09-19 NOTE — DISCHARGE INSTRUCTIONS
Peripheral Angiogram Discharge Instructions - Femoral     After you go home:    Have an adult stay with you until tomorrow.  Drink extra fluids for 2 days.  You may resume your normal diet.  No smoking       For 24 hours - due to the sedation you received:  Relax and take it easy.  Do NOT make any important or legal decisions.  Do NOT drive or operate machines at home or at work.  Do NOT drink alcohol.    Care of Groin Puncture Site:    For the first 24 hrs - check the puncture site every 1-2 hours while awake.  For 2 days, when you cough, sneeze, laugh or move your bowels, hold your hand over the puncture site and press firmly.  Remove the bandaid after 24 hours. If there is minor oozing, apply another bandaid and remove it after 12 hours.  It is normal to have a small bruise or pea size lump at the site.  You may shower tomorrow.  Do NOT take a bath, or use a hot tub or pool for at least 3 days. Do NOT scrub the site. Do not use lotion or powder near the puncture site.     Activity:            For 2 days:  No stooping or squatting  Do NOT do any heavy activity such as exercise, lifting, or straining.   No housework, yard work or any activity that make you sweat  Do NOT lift more than 10 pounds    Bleeding:    If you start bleeding from the site in your groin, lie down flat and press firmly on/above the site for 10 minutes.   Once bleeding stops, lay flat for 2 hours.   [facility] as soon as you can.       Call 911 right away if you have heavy bleeding or bleeding that does not stop.      Medicines:    If you are on Metformin (Glucophage) and your GFR (kidney function level) is >30, you may continue taking your Metformin.  If you are on Metformin (Glucophage) and your GFR (kidney function level) is <30, do not restart the Metformin for 48 hours after your procedure. Check with your primary care giver before restarting the Metformin to see if you need to have blood drawn to recheck your kidney  function (GFR).  If you are taking an antiplatelet medication such as Plavix, do not stop taking it until you talk to your provider.     Take your medications, including blood thinners, unless your provider tells you not to.    If you take Coumadin (Warfarin), have your INR checked by your provider in  3-5 days. Call your clinic to schedule this.  If you have stopped any medicines, check with your provider about when to restart them.        Follow Up Appointments:    Follow up with [facility] as directed.    Call the clinic if:    You have increased pain or a large or growing hard lump around the site.  The site is red, swollen, hot or tender.  Blood or fluid is draining from the site.  You have chills or a fever greater than 101 F (38 C).  Your leg feels numb, cool or changes color.  You have hives, a rash or unusual itching.  New pain in the back or belly that you cannot control with Tylenol.  Any questions or concerns.    Other Instructions:    If you received a stent - carry your stent card with you at all times.      If you have questions or your original symptoms do not improve, call:         [facility] @ [phone]

## 2022-09-19 NOTE — IR NOTE
Interventional Radiology Intra-procedural Nursing Note    Patient Name: Jaylen De La Vega  Medical Record Number: [id number]  Today's Date: September 19, 2022    Procedure: Left lower extremity angiogram, possible right lower extremity angiogram with possible intervention with moderate sedation  Start time: 0804  End time: 0849  Report provided to: OXANA Dumas  Patient depart time and location: 0901 to  Room 1    Note: Patient entered Interventional Radiology Suite number 1 via cart. Patient awake, alert and orientated. Assisted onto procedural table in supine position. Prepped and draped.  Dr. Orellana in room. Time out and procedure started. Vital signs stable. Telemetry reading NSR.    Procedure well tolerated by patient without complications. Procedure end with debrief by Dr. Orellana.  Manual pressure applied until hemostasis achieved.  Quick clot and tegaderm dressing applied to right interventional procedure access site, dressing is c/d/i.    3 hours bedrest per Dr. Orellana from 9665-2160.    Administered medication totals:  Lidocaine 1% 10 mL Intradermal  Heparin 4000 Units IVP  Versed 2 mg IVP  Fentanyl 100 mcg IVP    Last dose of sedation administered at 0840.

## 2022-09-20 ENCOUNTER — TELEPHONE (OUTPATIENT)
Dept: OTHER | Facility: CLINIC | Age: 77
End: 2022-09-20

## 2022-09-20 NOTE — TELEPHONE ENCOUNTER
Status post left leg angiogram with angioplasty of left above-the-knee popliteal artery occlusion with Dr. Ramón Orellana.      Spoke with patient, doing well feels great.  Right puncture site is without bleeding or swelling.  Minimal pain in the left lower extremity likely due to angioplasty.  No swelling noted.  Feet are warm and pink.  No fever.  Discussed follow-up plan we will see patient in 1 month with repeat ultrasound imaging and consult with . Patient has been instructed to contact us sooner if develops claudication symptoms or any concerns.    Patito Kelly RN  IR nurse clinician  [phone]

## 2022-09-21 DIAGNOSIS — I70.212 ATHEROSCLEROSIS OF NATIVE ARTERIES OF EXTREMITIES WITH INTERMITTENT CLAUDICATION, LEFT LEG (H): Primary | ICD-10-CM

## 2022-10-22 ENCOUNTER — HEALTH MAINTENANCE LETTER (OUTPATIENT)
Age: 77
End: 2022-10-22

## 2022-10-24 ENCOUNTER — HOSPITAL ENCOUNTER (OUTPATIENT)
Dept: ULTRASOUND IMAGING | Facility: CLINIC | Age: 77
Discharge: HOME | End: 2022-10-24
Attending: RADIOLOGY
Payer: COMMERCIAL

## 2022-10-24 DIAGNOSIS — I70.212 ATHEROSCLEROSIS OF NATIVE ARTERIES OF EXTREMITIES WITH INTERMITTENT CLAUDICATION, LEFT LEG (H): ICD-10-CM

## 2022-10-24 PROCEDURE — 93924 LWR XTR VASC STDY BILAT: CPT

## 2022-10-24 PROCEDURE — 93926 LOWER EXTREMITY STUDY: CPT | Mod: LT

## 2022-10-31 ENCOUNTER — OFFICE VISIT (OUTPATIENT)
Dept: OTHER | Facility: CLINIC | Age: 77
End: 2022-10-31
Attending: RADIOLOGY
Payer: COMMERCIAL

## 2022-10-31 DIAGNOSIS — I70.212 ATHEROSCLEROSIS OF NATIVE ARTERIES OF EXTREMITIES WITH INTERMITTENT CLAUDICATION, LEFT LEG (H): Primary | ICD-10-CM

## 2022-10-31 NOTE — RESULT ENCOUNTER NOTE
Results discussed directly with patient while patient was present. Any further details documented in the note.   Krys Kelly RN
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
## 2022-11-02 ENCOUNTER — OFFICE VISIT (OUTPATIENT)
Dept: PODIATRY | Facility: CLINIC | Age: 77
End: 2022-11-02
Payer: COMMERCIAL

## 2022-11-02 VITALS — WEIGHT: 172 LBS | SYSTOLIC BLOOD PRESSURE: 132 MMHG | DIASTOLIC BLOOD PRESSURE: 74 MMHG | BODY MASS INDEX: 23.99 KG/M2

## 2022-11-02 DIAGNOSIS — I70.212 ATHEROSCLEROSIS OF NATIVE ARTERIES OF EXTREMITIES WITH INTERMITTENT CLAUDICATION, LEFT LEG (H): Primary | ICD-10-CM

## 2022-11-02 DIAGNOSIS — L60.0 INGROWN NAIL OF GREAT TOE OF LEFT FOOT: ICD-10-CM

## 2022-11-02 DIAGNOSIS — M79.672 LEFT FOOT PAIN: ICD-10-CM

## 2022-11-02 DIAGNOSIS — E11.42 TYPE 2 DIABETES MELLITUS WITH DIABETIC POLYNEUROPATHY, WITHOUT LONG-TERM CURRENT USE OF INSULIN (H): Primary | ICD-10-CM

## 2022-11-02 PROCEDURE — 99203 OFFICE O/P NEW LOW 30 MIN: CPT | Mod: 25 | Performed by: PODIATRIST

## 2022-11-02 PROCEDURE — 11730 AVULSION NAIL PLATE SIMPLE 1: CPT | Mod: TA | Performed by: PODIATRIST

## 2022-11-02 RX ORDER — SILVER SULFADIAZINE 10 MG/G
CREAM TOPICAL 2 TIMES DAILY
Qty: 25 G | Refills: 1 | Status: SHIPPED | OUTPATIENT
Start: 2022-11-02 | End: 2023-03-15
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
## 2022-11-02 NOTE — PATIENT INSTRUCTIONS
Thank you for choosing [facility] Podiatry / Foot & Ankle Surgery!    DR QUICK'S CLINIC:  [facility]   [address]   [place] [address]      TRIAGE LINE: [phone]  APPOINTMENTS: [phone]  RADIOLOGY: [phone]  SET UP SURGERY: [phone]  FAX NUMBER: [phone]  BILLING QUESTIONS: [phone]       Follow up: As needed       INGROWN TOENAILS  When a toenail is ingrown, it is curved and grows into the skin, usually at the nail borders (the sides of the nail). This  digging in  of the nail irritates the skin, often creating pain, redness, swelling, and warmth in the toe.  If an ingrown nail causes a break in the skin, bacteria may enter and cause an infection in the area, which is often marked by drainage and a foul odor. However, even if the toe isn t painful, red, swollen, or warm, a nail that curves downward into the skin can progress to an infection.  CAUSES:  Heredity: In many people, the tendency for ingrown toenails is inherited.   Trauma: Sometimes an ingrown toenail is the result of trauma, such as stubbing your toe, having an object fall on your toe, or engaging in activities that involve repeated pressure on the toes, such as kicking or running.   Improper Trimming:  The most common cause of ingrown toenails is cutting your nails too short. This encourages the skin next to the nail to fold over the nail.   Improperly Sized Footwear: Ingrown toenails can result from wearing socks and shoes that are tight or short.   Nail Conditions: Ingrown toenails can be caused by nail problems, such as fungal infections or losing a nail due to trauma.   TREATMENT: Sometimes initial treatment for ingrown toenails can be safely performed at home. However, home treatment is strongly discouraged if an infection is suspected, or for those who have medical conditions that put feet at high risk, such as diabetes, nerve damage in the foot, or poor circulation.  Home care: If you  don t have an infection or any of the above medical conditions, you can soak your foot in room-temperature water (adding Epsom s salt may be recommended by your doctor), and gently massage the side of the nail fold to help reduce the inflammation.  Avoid attempting  bathroom surgery.  Repeated cutting of the nail can cause the condition to worsen over time. If your symptoms fail to improve, it s time to see a foot and ankle surgeon.  Physician care: After examining the toe, the foot and ankle surgeon will select the treatment best suited for you. If an infection is present, an oral antibiotic may be prescribed.  Sometimes a minor surgical procedure, often performed in the office, will ease the pain and remove the offending nail. After applying a local anesthetic, the doctor removes part of the nail s side border. Some nails may become ingrown again, requiring removal of the nail root.  Following the nail procedure, a light bandage will be applied. Most people experience very little pain after surgery and may resume normal activity the next day. If your surgeon has prescribed an oral antibiotic, be sure to take all the medication, even if your symptoms have improved.  PREVENTION:  Proper Trimming: Cut toenails in a fairly straight line, and don t cut them too short. You should be able to get your fingernail under the sides and end of the nail.   Well-fitting Footwear: Don t wear shoes that are short or tight in the toe area. Avoid shoes that are loose, because they too cause pressure on the toes, especially when running or walking briskly.     INGROWN TOENAIL REMOVAL AFTERCARE   Go directly home and elevate the affected foot on one or two pillows for the remainder of the day/evening if possible. Your toe may stay numb anywhere from 2-8 hours.   Take Tylenol, ibuprofen or another anti-inflammatory as needed for pain.   Take antibiotic if that has been prescribed. Finish the entire prescribed antibiotic even if your  symptoms have improved.   The evening of the procedure, soak/wash the affected area in warm water (you may add Epsom salt) for 5 to 10 minutes. Do this twice a day for 2-4 weeks (6-8 weeks if you had phenol) (you may count showering/bathing as one soak).  After soaks, pat the area dry and then allow to airdry for a few minutes. Apply antibiotic ointment to the area and cover with 2 X 2 gauze and paper tape or band-aid.  You may pursue everyday activities as tolerated with either an open toe shoe or cut-out shoe as needed or you may wear regular shoes if no pain is noted.  Watch for any signs and symptoms of infection such as: redness, red streaks going up the foot/leg, swelling, pus or foul odor. Those that have had the phenol procedure, the toe will drain longer and will look like it is infected because it is a chemical burn.   Please call with questions.

## 2022-11-07 ENCOUNTER — LAB (OUTPATIENT)
Dept: LAB | Facility: CLINIC | Age: 77
End: 2022-11-07
Payer: COMMERCIAL

## 2022-11-07 DIAGNOSIS — E11.42 TYPE 2 DIABETES MELLITUS WITH DIABETIC POLYNEUROPATHY, WITHOUT LONG-TERM CURRENT USE OF INSULIN (H): Primary | ICD-10-CM

## 2022-11-07 LAB — HBA1C MFR BLD: 7 % (ref 0–5.6)

## 2022-11-07 PROCEDURE — 83036 HEMOGLOBIN GLYCOSYLATED A1C: CPT

## 2022-11-07 PROCEDURE — 36415 COLL VENOUS BLD VENIPUNCTURE: CPT

## 2022-11-07 NOTE — RESULT ENCOUNTER NOTE
Mr. De La Vega,    -A1C (test of diabetes control the last 2-3 months) is at your goal. Please continue with your current plan. Also, you should make an appointment to see me and recheck your A1C test in 6 months.  This is the best control you've had in over a year Nik!    If you have further questions about the interpretation of your labs, [email or web address] is a good website to check out for further information.    Please contact the clinic if you have additional questions.  Thank you.    Sincerely,    Tj Verma MD

## 2022-11-25 DIAGNOSIS — J44.9 CHRONIC OBSTRUCTIVE PULMONARY DISEASE, UNSPECIFIED COPD TYPE (H): ICD-10-CM

## 2022-11-25 DIAGNOSIS — E11.42 TYPE 2 DIABETES MELLITUS WITH DIABETIC POLYNEUROPATHY, WITHOUT LONG-TERM CURRENT USE OF INSULIN (H): ICD-10-CM

## 2022-11-27 DIAGNOSIS — E11.42 TYPE 2 DIABETES MELLITUS WITH DIABETIC POLYNEUROPATHY, WITHOUT LONG-TERM CURRENT USE OF INSULIN (H): ICD-10-CM

## 2022-11-28 NOTE — TELEPHONE ENCOUNTER
Routing refill request to provider for review/approval because:  Drug not on the [facility] refill protocol   ACT    GLORIA RODRIGUEZ RN on 11/28/2022 at 11:43 AM   [facility]

## 2022-11-29 RX ORDER — METFORMIN HCL 500 MG
1000 TABLET, EXTENDED RELEASE 24 HR ORAL
Qty: 180 TABLET | Refills: 1 | Status: SHIPPED | OUTPATIENT
Start: 2022-11-29 | End: 2023-06-10

## 2022-11-29 RX ORDER — EXENATIDE 2 MG/.85ML
INJECTION, SUSPENSION, EXTENDED RELEASE SUBCUTANEOUS
Qty: 10.2 ML | Refills: 0 | Status: SHIPPED | OUTPATIENT
Start: 2022-11-29 | End: 2023-02-21

## 2022-11-29 RX ORDER — TIOTROPIUM BROMIDE 18 UG/1
CAPSULE ORAL; RESPIRATORY (INHALATION)
Qty: 90 CAPSULE | Refills: 3 | Status: SHIPPED | OUTPATIENT
Start: 2022-11-29 | End: 2023-12-04

## 2022-11-29 RX ORDER — GABAPENTIN 300 MG/1
CAPSULE ORAL
Qty: 270 CAPSULE | Refills: 1 | Status: SHIPPED | OUTPATIENT
Start: 2022-11-29 | End: 2023-06-10

## 2022-11-29 RX ORDER — DAPAGLIFLOZIN 5 MG/1
TABLET, FILM COATED ORAL
Qty: 90 TABLET | Refills: 1 | Status: SHIPPED | OUTPATIENT
Start: 2022-11-29 | End: 2023-02-07

## 2022-12-19 DIAGNOSIS — E11.9 TYPE 2 DIABETES MELLITUS WITHOUT COMPLICATION, WITHOUT LONG-TERM CURRENT USE OF INSULIN (H): ICD-10-CM

## 2022-12-19 NOTE — TELEPHONE ENCOUNTER
Routing refill request to provider for review/approval because:  Drug not on the [facility] refill protocol     Rosi Hammer RN  [facility]

## 2023-02-07 ENCOUNTER — LAB (OUTPATIENT)
Dept: LAB | Facility: CLINIC | Age: 78
End: 2023-02-07
Payer: COMMERCIAL

## 2023-02-07 DIAGNOSIS — E11.42 TYPE 2 DIABETES MELLITUS WITH DIABETIC POLYNEUROPATHY, WITHOUT LONG-TERM CURRENT USE OF INSULIN (H): ICD-10-CM

## 2023-02-07 DIAGNOSIS — E11.42 TYPE 2 DIABETES MELLITUS WITH DIABETIC POLYNEUROPATHY, WITHOUT LONG-TERM CURRENT USE OF INSULIN (H): Primary | ICD-10-CM

## 2023-02-07 LAB — HBA1C MFR BLD: 8.2 % (ref 0–5.6)

## 2023-02-07 PROCEDURE — 36415 COLL VENOUS BLD VENIPUNCTURE: CPT

## 2023-02-07 PROCEDURE — 83036 HEMOGLOBIN GLYCOSYLATED A1C: CPT

## 2023-02-07 RX ORDER — DAPAGLIFLOZIN 5 MG/1
10 TABLET, FILM COATED ORAL DAILY
Qty: 90 TABLET | Refills: 1
Start: 2023-02-07 | End: 2023-03-15

## 2023-02-08 NOTE — RESULT ENCOUNTER NOTE
Mr. De La Vega,    -A1C test (average blood sugar the last 2-3 months) is above your goal.   ADVISE: making a diabetic followup appointment in 4 weeks. Please check and record your blood sugars at least 2 times daily for 1 week prior to your appointment and bring for review.  Also, increase your Farxiga/dapagliflozin dose to 10 mg daily from your current 5 mg dose.  This should help lower your sugars a bit.  We'll do another A1C in 3 months or so, but I'd still like to see you in a month.    If you have further questions about the interpretation of your labs, [email or web address] is a good website to check out for further information.    Please contact the clinic if you have additional questions.  Thank you.    Sincerely,    Tj Verma MD

## 2023-03-15 ENCOUNTER — OFFICE VISIT (OUTPATIENT)
Dept: FAMILY MEDICINE | Facility: CLINIC | Age: 78
End: 2023-03-15
Payer: COMMERCIAL

## 2023-03-15 VITALS
TEMPERATURE: 97.6 F | BODY MASS INDEX: 23.66 KG/M2 | SYSTOLIC BLOOD PRESSURE: 116 MMHG | OXYGEN SATURATION: 94 % | HEART RATE: 87 BPM | HEIGHT: 71 IN | DIASTOLIC BLOOD PRESSURE: 62 MMHG | WEIGHT: 169 LBS | RESPIRATION RATE: 12 BRPM

## 2023-03-15 DIAGNOSIS — E11.42 TYPE 2 DIABETES MELLITUS WITH DIABETIC POLYNEUROPATHY, WITHOUT LONG-TERM CURRENT USE OF INSULIN (H): ICD-10-CM

## 2023-03-15 DIAGNOSIS — I73.9 LEFT LEG CLAUDICATION (H): Primary | ICD-10-CM

## 2023-03-15 DIAGNOSIS — J44.9 CHRONIC OBSTRUCTIVE PULMONARY DISEASE, UNSPECIFIED COPD TYPE (H): ICD-10-CM

## 2023-03-15 LAB
ANION GAP SERPL CALCULATED.3IONS-SCNC: 14 MMOL/L (ref 7–15)
BUN SERPL-MCNC: 21.2 MG/DL (ref 8–23)
CALCIUM SERPL-MCNC: 11.1 MG/DL (ref 8.8–10.2)
CHLORIDE SERPL-SCNC: 98 MMOL/L (ref 98–107)
CREAT SERPL-MCNC: 1.05 MG/DL (ref 0.67–1.17)
DEPRECATED HCO3 PLAS-SCNC: 28 MMOL/L (ref 22–29)
GFR SERPL CREATININE-BSD FRML MDRD: 74 ML/MIN/1.73M2
GLUCOSE SERPL-MCNC: 141 MG/DL (ref 70–99)
HBA1C MFR BLD: 8.1 % (ref 0–5.6)
POTASSIUM SERPL-SCNC: 4.5 MMOL/L (ref 3.4–5.3)
SODIUM SERPL-SCNC: 140 MMOL/L (ref 136–145)

## 2023-03-15 PROCEDURE — 80048 BASIC METABOLIC PNL TOTAL CA: CPT | Performed by: FAMILY MEDICINE

## 2023-03-15 PROCEDURE — 83036 HEMOGLOBIN GLYCOSYLATED A1C: CPT | Performed by: FAMILY MEDICINE

## 2023-03-15 PROCEDURE — 36415 COLL VENOUS BLD VENIPUNCTURE: CPT | Performed by: FAMILY MEDICINE

## 2023-03-15 PROCEDURE — 99214 OFFICE O/P EST MOD 30 MIN: CPT | Performed by: FAMILY MEDICINE

## 2023-03-15 RX ORDER — DAPAGLIFLOZIN 10 MG/1
10 TABLET, FILM COATED ORAL DAILY
Qty: 90 TABLET | Refills: 1 | Status: SHIPPED | OUTPATIENT
Start: 2023-03-15 | End: 2023-08-15

## 2023-03-15 ASSESSMENT — PAIN SCALES - GENERAL: PAINLEVEL: NO PAIN (0)

## 2023-03-15 NOTE — PROGRESS NOTES
Assessment & Plan   Problem List Items Addressed This Visit     Chronic obstructive pulmonary disease, unspecified COPD type (H)     Doing quite well on Spiriva for controller medication and  using rescue inhaler less than twice weekly.  Clinically asymptomatic both at rest and with activity.  Will renew meds and recheck in one year.         Left leg claudication (H) - Primary     S/p angioplasty with return of symptoms.  Continues on dual antiplatelet therapy with ASA and clopidogrel.  Continues to follow with Dr. Orellana.           Type 2 diabetes mellitus with diabetic polyneuropathy, without long-term current use of insulin (H)     Diabetes is improving/stable/worsening: unchanged.with A1C still above 8.  Continue current treatment regimen., Regular aerobic exercise. and ensure only 2-3 carbohydrate exchanges per meal  Diabetes will be reassessed in 3 months with lab draw.  If this remains above 8, consider starting background insulin at bedtime as he is on maximal doses GLP-1 agonist, SGLT-2 inhibitor and metformin.         Relevant Medications    dapagliflozin (FARXIGA) 10 MG TABS tablet    Other Relevant Orders    Hemoglobin A1c (Completed)    Basic metabolic panel  (Ca, Cl, CO2, Creat, Gluc, K, Na, BUN)                     No follow-ups on file.   Follow-up Visit   Expected date:  Jul 15, 2023 (Approximate)      Follow Up Appointment Details:     Follow-up with whom?: Me    Follow-Up for what?: Medicare Wellness    Welcome or Annual?: Annual Wellness    How?: Video                    Tj Verma Jr, MD  [facility] PRIOR MEDARDO Zaragoza is a 77 year old, presenting for the following health issues:  Diabetes      HPI   Combined Dm / Lipid / Htn Visit    Question 3/15/2023 10:46 AM CDT - Filed by Patient   How often are you checking your blood sugars? Continuous glucose monitor   Comments - checking blood sugars:    Average blood sugar result - am:   140   Average blood sugar  "result - noon: 120   Average blood sugar result - supper: 130   Average blood sugar result - bedtime: 125   Average blood sugar result - after meals (postprandial): 200   Which symptoms do you notice when your blood sugar is low? None   Comments: Other Symptoms of Low Blood Sugar / When do Symptoms Occur - (i.e. when blood sugars are below: ___)    Have you experienced any paresthesias (numbness or burning in feet) or sores?: Yes   Have you had a diabetic eye exam within the last year? Yes   Are you following a low fat/low cholesterol diet?   yes   Are you taking a \"statin\" medication or niaspan? yes   If you are taking a \"statin\" medication, have you been experiencing any muscle aches or other side effects? no   Are you taking other supplements or other medications to lower your cholesterol? no   How often are you checking your blood pressures? daily   What are the results of your blood pressure checks? 120   Are you following a low salt diet? Yes   Outside of work, how many days during the week do you exercise and how many hours each time? walking 45 minutes daily   Do you have any problems taking medications regularly? no   Do you have any side effects from medication? no   Are you on a special diet and if so, what kind (i.e. regular, low salt, etc)? no   Have there been any changes to your medical or surgical history? no   Have there been any changes to your social history? (tobacco use, alcohol use, sexual activity) no   Have there been any changes in your allergies? seasonal   Have there been any changes or updates to the medications you take? no   Do any of your medications need to be refilled? no   Please enter the name of the pharmacy you use (clicking on the icon below will allow you to type in your answer): [facility]   Do you have any additional concerns to address? no   Over the past 2 weeks, how often have you been bothered by any of the following problems?    Q1: Little interest or pleasure in doing " "things Not at all   Q2: Feeling down, depressed or hopeless Not at all   PHQ-2 Score (range: 0 - 6) 0       COPD Follow-Up    Overall, how are your COPD symptoms since your last clinic visit?  No change    How much fatigue or shortness of breath do you have when you are walking?  None    How much shortness of breath do you have when you are resting?  None    How often do you cough? Never    Have you noticed any change in your sputum/phlegm?  No    Have you experienced a recent fever? No    Please describe how far you can walk without stopping to rest:  Greater than 2 miles    How many flights of stairs are you able to walk up without stopping?  3 or more    Have you had any Emergency Room Visits, Urgent Care Visits, or Hospital Admissions because of your COPD since your last office visit?  No    History   Smoking Status     Former     Packs/day: 0.50     Years: 50.00     Types: Cigarettes     Start date: 6/14/1962     Quit date: 1/1/2012   Smokeless Tobacco     Never     No results found for: FEV1, QVS3WMK          Review of Systems         Objective    /62   Pulse 87   Temp 97.6  F (36.4  C) (Tympanic)   Resp 12   Ht 1.803 m (5' 11\")   Wt 76.7 kg (169 lb)   SpO2 94%   BMI 23.57 kg/m    Body mass index is 23.57 kg/m .  Physical Exam   GENERAL: healthy, alert and no distress    Results for orders placed or performed in visit on 03/15/23 (from the past 24 hour(s))   Hemoglobin A1c   Result Value Ref Range    Hemoglobin A1C 8.1 (H) 0.0 - 5.6 %    Narrative    Results consistent with previous.                   "

## 2023-03-15 NOTE — ASSESSMENT & PLAN NOTE
Doing quite well on Spiriva for controller medication and  using rescue inhaler less than twice weekly.  Clinically asymptomatic both at rest and with activity.  Will renew meds and recheck in one year.

## 2023-03-15 NOTE — RESULT ENCOUNTER NOTE
Mr. De La Vega,    -A1C test (average blood sugar the last 2-3 months) is above your goal.   ADVISE: Let's check your A1C again in 2 months Nik.  If it is again above 8, I think we need to start bedtime insulin given that you're on the maximum dose of metformin, Farxiga and Bydureon.      If you have further questions about the interpretation of your labs, [email or web address] is a good website to check out for further information.    Please contact the clinic if you have additional questions.  Thank you and good to see you today.    Sincerely,    Tj Verma MD

## 2023-03-15 NOTE — ASSESSMENT & PLAN NOTE
Diabetes is improving/stable/worsening: unchanged.with A1C still above 8.  Continue current treatment regimen., Regular aerobic exercise. and ensure only 2-3 carbohydrate exchanges per meal  Diabetes will be reassessed in 3 months with lab draw.  If this remains above 8, consider starting background insulin at bedtime as he is on maximal doses GLP-1 agonist, SGLT-2 inhibitor and metformin.

## 2023-03-15 NOTE — ASSESSMENT & PLAN NOTE
S/p angioplasty with return of symptoms.  Continues on dual antiplatelet therapy with ASA and clopidogrel.  Continues to follow with Dr. Orellana.

## 2023-03-16 NOTE — RESULT ENCOUNTER NOTE
Mr. De La Vega,    -Kidney function (GFR) is normal.  -Sodium is normal.  -Potassium is normal.  -Calcium is elevated and I'm concerned your high dose of vitamin D supplement may be contributing to this.  Please decrease this to 800 international unit(s) daily, Nik.  I'll recheck this when we recheck your A1C in 2 months.  -Glucose is elevated due to your diabetes.    If you have further questions about the interpretation of your labs, [email or web address] is a good website to check out for further information.    Please contact the clinic if you have additional questions.  Thank you.    Sincerely,    Tj Verma MD

## 2023-03-17 ENCOUNTER — MYC MEDICAL ADVICE (OUTPATIENT)
Dept: FAMILY MEDICINE | Facility: CLINIC | Age: 78
End: 2023-03-17
Payer: COMMERCIAL

## 2023-03-17 DIAGNOSIS — I73.9 LEFT LEG CLAUDICATION (H): ICD-10-CM

## 2023-03-17 DIAGNOSIS — I70.212 ATHEROSCLEROSIS OF NATIVE ARTERIES OF EXTREMITIES WITH INTERMITTENT CLAUDICATION, LEFT LEG (H): ICD-10-CM

## 2023-03-20 RX ORDER — CLOPIDOGREL BISULFATE 75 MG/1
75 TABLET ORAL DAILY
Qty: 90 TABLET | Refills: 3 | Status: SHIPPED | OUTPATIENT
Start: 2023-03-20 | End: 2024-03-02

## 2023-03-20 NOTE — TELEPHONE ENCOUNTER
MyChart message brian t    Sent rx to preferred pharmacy.     Asya BOYD RN   [facility] Triage

## 2023-04-23 ENCOUNTER — MYC MEDICAL ADVICE (OUTPATIENT)
Dept: OTHER | Facility: CLINIC | Age: 78
End: 2023-04-23
Payer: COMMERCIAL

## 2023-04-25 ENCOUNTER — TELEPHONE (OUTPATIENT)
Dept: OTHER | Facility: CLINIC | Age: 78
End: 2023-04-25
Payer: COMMERCIAL

## 2023-04-25 NOTE — TELEPHONE ENCOUNTER
Called patient, having significant claudication symptoms in the left leg.  Started approximately 1 month ago.  Patient is s/p popliteal angioplasty  Will schedule arterial duplex and ABIS for the soonest available.  Patito Kelly RN  Interventional Radiology  [phone]  Pager: [phone]

## 2023-04-25 NOTE — TELEPHONE ENCOUNTER
[facility]    Who is the name of the provider?:  Esteban    What is the location you see this provider at/preferred location?: [place]  Person calling / Facility: Nik De La Vega  Phone number:  [phone]  Nurse call back needed:  YES     Reason for call:  Patient scheduled his requested US:      Future Appointments   Date Time Provider Department Center   5/5/2023  1:00 PM [facility]   5/5/2023  1:40 PM [facility]     Patient wondering when he can be seen. No openings 5/8. Please call him back.    Pharmacy location:  n/a  Outside Imaging: n/a   Can we leave a detailed message on this number?  YES

## 2023-04-28 ENCOUNTER — HOSPITAL ENCOUNTER (OUTPATIENT)
Dept: ULTRASOUND IMAGING | Facility: CLINIC | Age: 78
Discharge: HOME | End: 2023-04-28
Attending: RADIOLOGY
Payer: COMMERCIAL

## 2023-04-28 DIAGNOSIS — I70.212 ATHEROSCLEROSIS OF NATIVE ARTERIES OF EXTREMITIES WITH INTERMITTENT CLAUDICATION, LEFT LEG (H): ICD-10-CM

## 2023-04-28 PROCEDURE — 93924 LWR XTR VASC STDY BILAT: CPT

## 2023-04-28 PROCEDURE — 93926 LOWER EXTREMITY STUDY: CPT | Mod: LT

## 2023-05-01 ENCOUNTER — TELEPHONE (OUTPATIENT)
Dept: OTHER | Facility: CLINIC | Age: 78
End: 2023-05-01
Payer: COMMERCIAL

## 2023-05-01 DIAGNOSIS — I70.212 ATHEROSCLEROSIS OF NATIVE ARTERIES OF EXTREMITIES WITH INTERMITTENT CLAUDICATION, LEFT LEG (H): Primary | ICD-10-CM

## 2023-05-01 RX ORDER — HEPARIN SODIUM 200 [USP'U]/100ML
1 INJECTION, SOLUTION INTRAVENOUS CONTINUOUS PRN
Status: CANCELLED | OUTPATIENT
Start: 2023-05-01

## 2023-05-01 NOTE — TELEPHONE ENCOUNTER
Reviewed with Dr. Orellana.  Patient scheduled for left leg angiogram on 5/16: check in at 8 am  Reviewed instructions with patient and mailed.  Will obtain pre-op.  Patito Kelly RN  IR nurse clinician  [phone]

## 2023-05-01 NOTE — TELEPHONE ENCOUNTER
Jaylen De La Vega,    Your visit to [facility] for your procedure is coming soon and we look forward to seeing you! This friendly reminder and pre-procedure checklist will help to ensure your procedure goes smoothly and meets your expectations. At [facility], our goal is to provide you with a great patient experience and to deliver genuine, professional care to every patient.     Please complete all the steps in advance of your visit. If you have any questions about the items listed below, please give our office a call. We can be reached at [phone] or visit our website at [email or web address] for more information.     Procedure: Left leg angiogram    Procedure Date :  5/16    Procedure Time :  9:30 am    Arrival Time: 8: 00 am        Admission Type: Outpatient    Surgeon: Dr. Ramón Orellana    Procedure Location: [facility]      If you take blood thinners: SEE SPECIFIC INSTRUCTIONS BELOW    PLEASE DO NOT STOP YOUR ASPIRIN OR PLAVIX UNLESS SPECIFICALLY DIRECTED BY THE VASCULAR SURGEON TO STOP!  - In most cases Vascular providers want you to continue these. This is different from most NON vascular surgeries and may not be well known by your Primary Care Provider       Prepare for the peripheral angiography as follows:    Do not eat 8 hours before your procedure. You may have clear liquids up to 2 hour before surgery.    Tell your healthcare provider about all medicines you take and any allergies you may have.    Arrange for a family member or friend to drive you home.    If you are on Metformin, please HOLD for 48 hours after the procedure.    Please be sure to address your diabetic medications with your Primary Care Physician prior to your angiogram.    Peripheral Angiography    Peripheral angiography is an outpatient procedure that makes a  map  of the vessels (arteries) in your lower body, legs, and arms, using  X-ray and dye.This map can show where blood flow may be blocked.    An angiogram is commonly performed under sedation with the use of local anesthesia.      The procedure usually starts with a needle put into the femoral (groin) artery. From one treatment site, areas all over the body can be treated.  After access is established, catheters (thin tubes) and wires are threaded through the arterial system to a specific area of interest or throughout the entire body.  As a contrast agent (iodine dye) is injected, X-ray images are taken to let your provider view the flow of the dye and identify blockages. The surgeon can then choose the best mode of therapy for you - whether during or following the angiogram. This decision depends on your symptoms and the severity and characteristics of the blockages.  Two common therapies that can be provided during the angiogram are balloon angioplasty and stent placement.                     Angioplasty can be used to open arterial blockages. Guided by X-ray, your provider navigates through the blockage with a wire and introduces a special device equipped with an inflatable balloon. After positioning the balloon device across the blocked portion of the artery, the provider inflates the balloon to expand the artery and compress the blockage. The balloon is then deflated and removed while keeping the wire in place across the area that has been treated. Next, contrast dye is injected to assess the result. Treatment is considered a success if blood flow is improved and less than 30% of the blockage remains. If the vessel is still considerably narrowed, placing a stent may be the next step.      Stents are used to prop open an artery at the site of a narrowing. Stents are generally placed after balloon angioplasty when there is residual narrowing or insufficient blood flow in a treated vessel. Stents are considered a permanent implant and cannot be used if you have a metal allergy. Stents  that are used in the leg are constructed of a nickel-titanium alloy (Nitinol), a memory-shaped metal. This alloy has a predetermined size and shape at body temperature and expands to this size and shape after being introduced through a catheter. These stents resist kinking and are flexible so that damage from activities that involve your legs is minimized.    Your procedure may require other techniques to address the problem or plaque.     If surgery is felt to be a better option, your vascular provider will obtain any additional X-ray images needed to plan a surgical bypass of the blocked vessel/s and will then conclude the angiogram.      During the procedure  Here is what to expect:  You may get medicine through an IV (intravenous) line to relax you. You re given an injection to numb the insertion site. Then, a tiny skin cut (incision) is made near an artery in your groin.    Your provider inserts a thin tube (catheter) through the incision. He or she then threads the catheter into an artery while looking at a video monitor.    Contrast  dye  is injected into the catheter to confirm position. You may feel warmth or pressure in your legs and back. You lie still as X-rays are taken. The catheter is then taken out.  After the procedure  You ll be taken to a recovery area. A healthcare provider will apply pressure to the site for about 10 minutes. Your healthcare provider will tell you how long to lie down and keep the insertion site still. Your healthcare provider will discuss the results with you soon after the procedure.      Angiogram Procedure Discharge Instructions:     1. If you received sedation for your procedure: Do not drive or operate heavy machinery for the rest of the day.  2. Avoid strenuous activity for 72 hours (3 days):                        - Do not lift greater than 10 pounds.                        - Excessive exercise                        - Straining                        - Return to your  normal activities as you tolerate after the 3 days restriction  3. Avoid tub baths, Jacuzzis, hot tubs and pools for 72 hours (3 days) or until puncture site is will healed.  4. You may shower beginning tomorrow. Do not scrub puncture site(s) until well healed, pat dry.  5. You can expect to return to work 1-2 days after your procedure - depending on the nature of your profession.  6. It is normal to have some tenderness and minimal swelling at puncture site. A small area of discoloration may be present. Tenderness typically subsides in 24-48 hours. A small knot may also be present at puncture site for 6-8 weeks, this can be a normal part of the healing process.       After the angiogram If you:      1. Experience any bleeding or active swelling from puncture site: Lie down, firmly apply pressure to puncture site and CALL 9-1-1  2. Fever greater than 101 degrees Fahrenheit.  3. Redness, swelling, warmth to touch, or purulent (yellow/green/foul smelling) drainage from the puncture site.  4. Increasing pain, tenderness or swelling at puncture site OR of arm/leg near puncture site.  5. Feeling weak or faint.  6. Change in color, temperature, or sensation of arm/leg where puncture was made.  7. You can t feel your thrill (pulse at your dialysis fistula site) or it feels weak (If you had fistulogram done).     Call us with any other questions or concerns after your procedure: [phone]      All invasive procedures can have complications. While the risk of an angiogram is low it is not zero. The most common complications are related to the arterial access site.       Risks/ Complications     Bruising is Common  You will likely have bruising (ecchymosis) where the artery was entered.      Pain and Bleeding  Less commonly, patients experience pain and bleeding that may include blood collecting under the skin (hematoma).      Blockage and Leakage   In rare cases, the access artery can become blocked. Infrequently,  "patients experience persistent leakage of blood where the artery was entered, which can result in the formation of a pseudoaneurysm--a blood-filled sac--that may require further treatment.    Other complications related to an angiogram include:   Allergic reaction to the iodine contrast dye, which can lead to the development of kidney failure.  Very rarely during balloon angioplasty and/or stent placement, part of the arterial blockage can break off (embolism) and travel to more distant arteries. This can worsen blood flow.    Pre-Procedure checklist:    [x] A Pre-op physical within 30 days of the procedure is required. You will need to set up an appointment with your primary care provider.  [x] Contact your insurance regarding coverage    If you would like a Good [place] Estimate for your upcoming procedure at [facility], contact Cost of Care Estimates     Advocates are available Monday through Friday 8am - 5pm   [phone]    You may also submit a request online at [email or web address]  - Complete the secure online form found under \"Services and Procedure Pricing\"   [] DO NOT BRING FMLA WITH TO SURGERY.  These should be sent to the provider's office by fax to [phone].     [x] Day of Surgery    Medications - Take as indicated with sips of water.     Wear comfortable loose-fitting clothes. Wear your glasses-Not contacts. Do not wear jewelry and remove body piercings. Surgery may be cancelled if they are not removed.     You may have 1 family member wait in the lobby during your surgery. Visitor restrictions are subject to change. Please verify with the surgery nurse when they call.     If same day surgery-Have a someone come with you to surgery that can help you understand the surgeon's instructions, drive you home and stay with you overnight the first night.    [x] You will receive a call from a surgery nurse 1-3 days prior to surgery. They will go over more details with " you.    Notify our office right away, if you have any changes in your health status, or if you develop a cold, flu, diarrhea, infection, fever or sore throat before your scheduled surgery date. We can be reached at  [phone] Monday-Friday 8 am-4:30 pm if you have any questions.   Thank you for choosing [facility]

## 2023-05-08 NOTE — PATIENT INSTRUCTIONS
Additional Medication Instructions:  Morning of procedure: Take hydrochlorothiazide, plavix, aspirin, and gabapentin with a sip of water.  Also utilize Spiriva the morning of the procedure.    Hold your dapagliflozin for 3 days prior to procedure    Hold losartan day for 24 hour prior to procedure    For 7 days prior to procedure: Hold all vitamins/supplements.  Hold all NSAID medications (ibuprofen/motrin/aleve).  Tylenol products OK.    For informational purposes only. Not to replace the advice of your health care provider. Copyright   ,  [facility]. All rights reserved. Clinically reviewed by Eliana Forte MD. [employer] [id number] - REV .  Preparing for Your Surgery  Getting started  A nurse will call you to review your health history and instructions. They will give you an arrival time based on your scheduled surgery time. Please be ready to share:  Your doctor's clinic name and phone number  Your medical, surgical, and anesthesia history  A list of allergies and sensitivities  A list of medicines, including herbal treatments and over-the-counter drugs  Whether the patient has a legal guardian (ask how to send us the papers in advance)  Please tell us if you're pregnant--or if there's any chance you might be pregnant. Some surgeries may injure a fetus (unborn baby), so they require a pregnancy test. Surgeries that are safe for a fetus don't always need a test, and you can choose whether to have one.   If you have a child who's having surgery, please ask for a copy of Preparing for Your Child's Surgery.    Preparing for surgery  Within 10 to 30 days of surgery: Have a pre-op exam (sometimes called an H&P, or History and Physical). This can be done at a clinic or pre-operative center.  If you're having a , you may not need this exam. Talk to your care team.  At your pre-op exam, talk to your care team about all medicines you take. If you need to stop any medicines before surgery, ask  when to start taking them again.  We do this for your safety. Many medicines can make you bleed too much during surgery. Some change how well surgery (anesthesia) drugs work.  Call your insurance company to let them know you're having surgery. (If you don't have insurance, call [phone].)  Call your clinic if there's any change in your health. This includes signs of a cold or flu (sore throat, runny nose, cough, rash, fever). It also includes a scrape or scratch near the surgery site.  If you have questions on the day of surgery, call your hospital or surgery center.  Eating and drinking guidelines  For your safety: Unless your surgeon tells you otherwise, follow the guidelines below.  Eat and drink as usual until 8 hours before you arrive for surgery. After that, no food or milk.  Drink clear liquids until 2 hours before you arrive. These are liquids you can see through, like water, Gatorade, and Propel Water. They also include plain black coffee and tea (no cream or milk), candy, and breath mints. You can spit out gum when you arrive.  If you drink alcohol: Stop drinking it the night before surgery.  If your care team tells you to take medicine on the morning of surgery, it's okay to take it with a sip of water.  Preventing infection  Shower or bathe the night before and morning of your surgery. Follow the instructions your clinic gave you. (If no instructions, use regular soap.)  Don't shave or clip hair near your surgery site. We'll remove the hair if needed.  Don't smoke or vape the morning of surgery. You may chew nicotine gum up to 2 hours before surgery. A nicotine patch is okay.  Note: Some surgeries require you to completely quit smoking and nicotine. Check with your surgeon.  Your care team will make every effort to keep you safe from infection. We will:  Clean our hands often with soap and water (or an alcohol-based hand rub).  Clean the skin at your surgery site with a special soap that kills  germs.  Give you a special gown to keep you warm. (Cold raises the risk of infection.)  Wear special hair covers, masks, gowns and gloves during surgery.  Give antibiotic medicine, if prescribed. Not all surgeries need antibiotics.  What to bring on the day of surgery  Photo ID and insurance card  Copy of your health care directive, if you have one  Glasses and hearing aids (bring cases)  You can't wear contacts during surgery  Inhaler and eye drops, if you use them (tell us about these when you arrive)  CPAP machine or breathing device, if you use them  A few personal items, if spending the night  If you have . . .  A pacemaker, ICD (cardiac defibrillator) or other implant: Bring the ID card.  An implanted stimulator: Bring the remote control.  A legal guardian: Bring a copy of the certified (court-stamped) guardianship papers.  Please remove any jewelry, including body piercings. Leave jewelry and other valuables at home.  If you're going home the day of surgery  You must have a responsible adult drive you home. They should stay with you overnight as well.  If you don't have someone to stay with you, and you aren't safe to go home alone, we may keep you overnight. Insurance often won't pay for this.  After surgery  If it's hard to control your pain or you need more pain medicine, please call your surgeon's office.  Questions?   If you have any questions for your care team, list them here: _________________________________________________________________________________________________________________________________________________________________________ ____________________________________ ____________________________________ ____________________________________

## 2023-05-09 ENCOUNTER — OFFICE VISIT (OUTPATIENT)
Dept: FAMILY MEDICINE | Facility: CLINIC | Age: 78
End: 2023-05-09
Payer: COMMERCIAL

## 2023-05-09 VITALS
HEART RATE: 98 BPM | HEIGHT: 71 IN | SYSTOLIC BLOOD PRESSURE: 122 MMHG | BODY MASS INDEX: 24.02 KG/M2 | DIASTOLIC BLOOD PRESSURE: 68 MMHG | TEMPERATURE: 97.6 F | OXYGEN SATURATION: 92 % | WEIGHT: 171.6 LBS | RESPIRATION RATE: 14 BRPM

## 2023-05-09 DIAGNOSIS — E83.52 HYPERCALCEMIA: ICD-10-CM

## 2023-05-09 DIAGNOSIS — E11.42 TYPE 2 DIABETES MELLITUS WITH DIABETIC POLYNEUROPATHY, WITHOUT LONG-TERM CURRENT USE OF INSULIN (H): ICD-10-CM

## 2023-05-09 DIAGNOSIS — J44.9 CHRONIC OBSTRUCTIVE PULMONARY DISEASE, UNSPECIFIED COPD TYPE (H): ICD-10-CM

## 2023-05-09 DIAGNOSIS — Z01.818 PREOP GENERAL PHYSICAL EXAM: Primary | ICD-10-CM

## 2023-05-09 DIAGNOSIS — Z23 NEED FOR DIPHTHERIA-TETANUS-PERTUSSIS (TDAP) VACCINE: ICD-10-CM

## 2023-05-09 DIAGNOSIS — I73.9 LEFT LEG CLAUDICATION (H): ICD-10-CM

## 2023-05-09 LAB
CA-I BLD-MCNC: 4.7 MG/DL (ref 4.4–5.2)
CALCIUM SERPL-MCNC: 10.8 MG/DL (ref 8.8–10.2)
HBA1C MFR BLD: 7.6 % (ref 0–5.6)

## 2023-05-09 PROCEDURE — 82330 ASSAY OF CALCIUM: CPT | Performed by: PHYSICIAN ASSISTANT

## 2023-05-09 PROCEDURE — 36415 COLL VENOUS BLD VENIPUNCTURE: CPT | Performed by: PHYSICIAN ASSISTANT

## 2023-05-09 PROCEDURE — 83036 HEMOGLOBIN GLYCOSYLATED A1C: CPT | Performed by: PHYSICIAN ASSISTANT

## 2023-05-09 PROCEDURE — 82310 ASSAY OF CALCIUM: CPT | Performed by: PHYSICIAN ASSISTANT

## 2023-05-09 PROCEDURE — 82306 VITAMIN D 25 HYDROXY: CPT | Performed by: PHYSICIAN ASSISTANT

## 2023-05-09 PROCEDURE — 99214 OFFICE O/P EST MOD 30 MIN: CPT | Performed by: PHYSICIAN ASSISTANT
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
## 2023-05-12 DIAGNOSIS — E83.52 HYPERCALCEMIA: Primary | ICD-10-CM

## 2023-05-12 NOTE — PROGRESS NOTES
PTHI could not be added on to a 3 day old specimen as the specimen has to be processed within 24 hours of blood draw. Patient will have to be redrawn for test

## 2023-05-13 DIAGNOSIS — E83.52 HYPERCALCEMIA: Primary | ICD-10-CM

## 2023-05-14 DIAGNOSIS — E11.42 TYPE 2 DIABETES MELLITUS WITH DIABETIC POLYNEUROPATHY, WITHOUT LONG-TERM CURRENT USE OF INSULIN (H): ICD-10-CM

## 2023-05-15 RX ORDER — FLUMAZENIL 0.1 MG/ML
0.2 INJECTION, SOLUTION INTRAVENOUS
Status: CANCELLED | OUTPATIENT
Start: 2023-05-15

## 2023-05-15 RX ORDER — NALOXONE HYDROCHLORIDE 0.4 MG/ML
0.2 INJECTION, SOLUTION INTRAMUSCULAR; INTRAVENOUS; SUBCUTANEOUS
Status: CANCELLED | OUTPATIENT
Start: 2023-05-15

## 2023-05-15 RX ORDER — NALOXONE HYDROCHLORIDE 0.4 MG/ML
0.4 INJECTION, SOLUTION INTRAMUSCULAR; INTRAVENOUS; SUBCUTANEOUS
Status: CANCELLED | OUTPATIENT
Start: 2023-05-15

## 2023-05-15 RX ORDER — FENTANYL CITRATE 50 UG/ML
25-50 INJECTION, SOLUTION INTRAMUSCULAR; INTRAVENOUS EVERY 5 MIN PRN
Status: CANCELLED | OUTPATIENT
Start: 2023-05-15

## 2023-05-15 NOTE — PROGRESS NOTES
Unable to add-on test due to specimen processing within 24 hours of blood being drawn. Specimen is 6 days old and the patient must be redrawn due to specimen processing

## 2023-05-16 ENCOUNTER — HOSPITAL ENCOUNTER (OUTPATIENT)
Dept: INTERVENTIONAL RADIOLOGY/VASCULAR | Facility: CLINIC | Age: 78
Discharge: HOME | End: 2023-05-16
Attending: RADIOLOGY | Admitting: RADIOLOGY
Payer: COMMERCIAL

## 2023-05-16 ENCOUNTER — HOSPITAL ENCOUNTER (OUTPATIENT)
Facility: CLINIC | Age: 78
Discharge: HOME | End: 2023-05-16
Admitting: RADIOLOGY
Payer: COMMERCIAL

## 2023-05-16 VITALS
WEIGHT: 169 LBS | HEIGHT: 71 IN | RESPIRATION RATE: 16 BRPM | HEART RATE: 85 BPM | DIASTOLIC BLOOD PRESSURE: 73 MMHG | BODY MASS INDEX: 23.66 KG/M2 | OXYGEN SATURATION: 94 % | SYSTOLIC BLOOD PRESSURE: 142 MMHG | TEMPERATURE: 97.4 F

## 2023-05-16 VITALS
HEART RATE: 99 BPM | RESPIRATION RATE: 13 BRPM | SYSTOLIC BLOOD PRESSURE: 137 MMHG | OXYGEN SATURATION: 93 % | DIASTOLIC BLOOD PRESSURE: 85 MMHG

## 2023-05-16 DIAGNOSIS — I73.9 LEFT LEG CLAUDICATION (H): Primary | ICD-10-CM

## 2023-05-16 DIAGNOSIS — I73.9 LEFT LEG CLAUDICATION (H): ICD-10-CM

## 2023-05-16 DIAGNOSIS — I70.212 ATHEROSCLEROSIS OF NATIVE ARTERIES OF EXTREMITIES WITH INTERMITTENT CLAUDICATION, LEFT LEG (H): ICD-10-CM

## 2023-05-16 LAB
ANION GAP SERPL CALCULATED.3IONS-SCNC: 15 MMOL/L (ref 7–15)
APTT PPP: 25 SECONDS (ref 22–38)
BUN SERPL-MCNC: 21.2 MG/DL (ref 8–23)
CALCIUM SERPL-MCNC: 10.1 MG/DL (ref 8.8–10.2)
CHLORIDE SERPL-SCNC: 94 MMOL/L (ref 98–107)
CREAT SERPL-MCNC: 1.07 MG/DL (ref 0.67–1.17)
DEPRECATED HCO3 PLAS-SCNC: 28 MMOL/L (ref 22–29)
ERYTHROCYTE [DISTWIDTH] IN BLOOD BY AUTOMATED COUNT: 13.9 % (ref 10–15)
GFR SERPL CREATININE-BSD FRML MDRD: 72 ML/MIN/1.73M2
GLUCOSE SERPL-MCNC: 159 MG/DL (ref 70–99)
HCT VFR BLD AUTO: 52.9 % (ref 40–53)
HGB BLD-MCNC: 17.2 G/DL (ref 13.3–17.7)
INR PPP: 1.08 (ref 0.85–1.15)
MCH RBC QN AUTO: 29.4 PG (ref 26.5–33)
MCHC RBC AUTO-ENTMCNC: 32.5 G/DL (ref 31.5–36.5)
MCV RBC AUTO: 90 FL (ref 78–100)
PLATELET # BLD AUTO: 221 10E3/UL (ref 150–450)
POTASSIUM SERPL-SCNC: 3.9 MMOL/L (ref 3.4–5.3)
RBC # BLD AUTO: 5.85 10E6/UL (ref 4.4–5.9)
SODIUM SERPL-SCNC: 137 MMOL/L (ref 136–145)
WBC # BLD AUTO: 5.7 10E3/UL (ref 4–11)

## 2023-05-16 PROCEDURE — C1769 GUIDE WIRE: HCPCS

## 2023-05-16 PROCEDURE — 272N000124 HC CATH CR11

## 2023-05-16 PROCEDURE — 272N000571 HC SHEATH CR8

## 2023-05-16 PROCEDURE — 250N000011 HC RX IP 250 OP 636: Performed by: NURSE PRACTITIONER

## 2023-05-16 PROCEDURE — 272N000566 HC SHEATH CR3

## 2023-05-16 PROCEDURE — 36591 DRAW BLOOD OFF VENOUS DEVICE: CPT

## 2023-05-16 PROCEDURE — C1725 CATH, TRANSLUMIN NON-LASER: HCPCS

## 2023-05-16 PROCEDURE — 250N000009 HC RX 250: Performed by: NURSE PRACTITIONER

## 2023-05-16 PROCEDURE — 250N000011 HC RX IP 250 OP 636: Performed by: RADIOLOGY

## 2023-05-16 PROCEDURE — 999N000012 HC STATISTIC ANGIOGRAM, STENT, VERTEBRO PLASTY

## 2023-05-16 PROCEDURE — 75625 CONTRAST EXAM ABDOMINL AORTA: CPT

## 2023-05-16 PROCEDURE — 255N000002 HC RX 255 OP 636: Performed by: RADIOLOGY

## 2023-05-16 PROCEDURE — 85027 COMPLETE CBC AUTOMATED: CPT | Performed by: RADIOLOGY

## 2023-05-16 PROCEDURE — 272N000116 HC CATH CR1

## 2023-05-16 PROCEDURE — 76937 US GUIDE VASCULAR ACCESS: CPT

## 2023-05-16 PROCEDURE — 258N000003 HC RX IP 258 OP 636: Performed by: RADIOLOGY

## 2023-05-16 PROCEDURE — 272N000302 HC DEVICE INFLATION CR5

## 2023-05-16 PROCEDURE — 80048 BASIC METABOLIC PNL TOTAL CA: CPT | Performed by: RADIOLOGY

## 2023-05-16 PROCEDURE — 272N000196 HC ACCESSORY CR5

## 2023-05-16 PROCEDURE — 85730 THROMBOPLASTIN TIME PARTIAL: CPT | Performed by: RADIOLOGY

## 2023-05-16 PROCEDURE — 85610 PROTHROMBIN TIME: CPT | Performed by: RADIOLOGY

## 2023-05-16 PROCEDURE — C1760 CLOSURE DEV, VASC: HCPCS

## 2023-05-16 PROCEDURE — 272N000570 HC SHEATH CR7

## 2023-05-16 PROCEDURE — 36415 COLL VENOUS BLD VENIPUNCTURE: CPT | Performed by: RADIOLOGY

## 2023-05-16 RX ORDER — NALOXONE HYDROCHLORIDE 0.4 MG/ML
0.2 INJECTION, SOLUTION INTRAMUSCULAR; INTRAVENOUS; SUBCUTANEOUS
Status: DISCONTINUED | OUTPATIENT
Start: 2023-05-16 | End: 2023-05-17 | Stop reason: HOSPADM

## 2023-05-16 RX ORDER — FLUMAZENIL 0.1 MG/ML
0.2 INJECTION, SOLUTION INTRAVENOUS
Status: DISCONTINUED | OUTPATIENT
Start: 2023-05-16 | End: 2023-05-17 | Stop reason: HOSPADM

## 2023-05-16 RX ORDER — HEPARIN SODIUM 1000 [USP'U]/ML
500-6000 INJECTION, SOLUTION INTRAVENOUS; SUBCUTANEOUS
Status: COMPLETED | OUTPATIENT
Start: 2023-05-16 | End: 2023-05-16

## 2023-05-16 RX ORDER — HEPARIN SODIUM 200 [USP'U]/100ML
1 INJECTION, SOLUTION INTRAVENOUS CONTINUOUS PRN
Status: DISCONTINUED | OUTPATIENT
Start: 2023-05-16 | End: 2023-05-17 | Stop reason: HOSPADM

## 2023-05-16 RX ORDER — NALOXONE HYDROCHLORIDE 0.4 MG/ML
0.4 INJECTION, SOLUTION INTRAMUSCULAR; INTRAVENOUS; SUBCUTANEOUS
Status: DISCONTINUED | OUTPATIENT
Start: 2023-05-16 | End: 2023-05-17 | Stop reason: HOSPADM

## 2023-05-16 RX ORDER — IODIXANOL 320 MG/ML
100 INJECTION, SOLUTION INTRAVASCULAR ONCE
Status: COMPLETED | OUTPATIENT
Start: 2023-05-16 | End: 2023-05-16

## 2023-05-16 RX ORDER — ACETAMINOPHEN 325 MG/1
650 TABLET ORAL
Status: DISCONTINUED | OUTPATIENT
Start: 2023-05-16 | End: 2023-05-16 | Stop reason: HOSPADM

## 2023-05-16 RX ORDER — LIDOCAINE 40 MG/G
CREAM TOPICAL
Status: DISCONTINUED | OUTPATIENT
Start: 2023-05-16 | End: 2023-05-16 | Stop reason: HOSPADM

## 2023-05-16 RX ORDER — SODIUM CHLORIDE 9 MG/ML
INJECTION, SOLUTION INTRAVENOUS CONTINUOUS
Status: DISCONTINUED | OUTPATIENT
Start: 2023-05-16 | End: 2023-05-16 | Stop reason: HOSPADM

## 2023-05-16 RX ORDER — FENTANYL CITRATE 50 UG/ML
25-50 INJECTION, SOLUTION INTRAMUSCULAR; INTRAVENOUS EVERY 5 MIN PRN
Status: DISCONTINUED | OUTPATIENT
Start: 2023-05-16 | End: 2023-05-17 | Stop reason: HOSPADM

## 2023-05-16 RX ADMIN — MIDAZOLAM 1 MG: 1 INJECTION INTRAMUSCULAR; INTRAVENOUS at 10:12

## 2023-05-16 RX ADMIN — FENTANYL CITRATE 50 MCG: 50 INJECTION, SOLUTION INTRAMUSCULAR; INTRAVENOUS at 10:26

## 2023-05-16 RX ADMIN — HEPARIN SODIUM 4 BAG: 200 INJECTION, SOLUTION INTRAVENOUS at 09:35

## 2023-05-16 RX ADMIN — FENTANYL CITRATE 50 MCG: 50 INJECTION, SOLUTION INTRAMUSCULAR; INTRAVENOUS at 10:13

## 2023-05-16 RX ADMIN — MIDAZOLAM 1 MG: 1 INJECTION INTRAMUSCULAR; INTRAVENOUS at 09:52

## 2023-05-16 RX ADMIN — FENTANYL CITRATE 50 MCG: 50 INJECTION, SOLUTION INTRAMUSCULAR; INTRAVENOUS at 10:42

## 2023-05-16 RX ADMIN — SODIUM CHLORIDE: 9 INJECTION, SOLUTION INTRAVENOUS at 08:33

## 2023-05-16 RX ADMIN — MIDAZOLAM 1 MG: 1 INJECTION INTRAMUSCULAR; INTRAVENOUS at 10:42

## 2023-05-16 RX ADMIN — LIDOCAINE HYDROCHLORIDE 9 ML: 10 INJECTION, SOLUTION INFILTRATION; PERINEURAL at 09:55

## 2023-05-16 RX ADMIN — IODIXANOL 80 ML: 320 INJECTION, SOLUTION INTRAVASCULAR at 11:06

## 2023-05-16 RX ADMIN — HEPARIN SODIUM 4000 UNITS: 1000 INJECTION INTRAVENOUS; SUBCUTANEOUS at 10:04

## 2023-05-16 RX ADMIN — FENTANYL CITRATE 50 MCG: 50 INJECTION, SOLUTION INTRAMUSCULAR; INTRAVENOUS at 09:53

## 2023-05-16 RX ADMIN — MIDAZOLAM 1 MG: 1 INJECTION INTRAMUSCULAR; INTRAVENOUS at 10:34

## 2023-05-16 RX ADMIN — HEPARIN SODIUM 1000 UNITS: 1000 INJECTION INTRAVENOUS; SUBCUTANEOUS at 10:19

## 2023-05-16 ASSESSMENT — ACTIVITIES OF DAILY LIVING (ADL)
ADLS_ACUITY_SCORE: 35

## 2023-05-16 NOTE — PROGRESS NOTES
Care Suites Discharge Summary    Discharge Criteria:   Discharge Criteria met per MD orders: Yes.   Vital signs stable.     Pt demonstrates ability to ambulate safely: Yes.  (See discharge questionnaire for additional information)    Discharge instructions & education:   Discharge instructions reviewed with patient. Patient verbalizes understanding.   Additional patient education provided:  Lower ext. angiogram    Medications:   Patient will be discharging on new medications- -no   Patient verbalizes reason for use, start date, and side effects NA.    Items returned to patient:   Home and hospital acquired medications returned to patient NA   Listed belongings gathered and returned to patient: Yes    Patient discharged to home.     Theo Mcdaniel RN

## 2023-05-16 NOTE — DISCHARGE INSTRUCTIONS
Peripheral Angiogram Discharge Instructions - Femoral     After you go home:    Have an adult stay with you until tomorrow.  Drink extra fluids for 2 days.  You may resume your normal diet.  No smoking       For 24 hours - due to the sedation you received:  Relax and take it easy.  Do NOT make any important or legal decisions.  Do NOT drive or operate machines at home or at work.  Do NOT drink alcohol.    Care of Groin Puncture Site:    For the first 24 hrs - check the puncture site every 1-2 hours while awake.  For 2 days, when you cough, sneeze, laugh or move your bowels, hold your hand over the puncture site and press firmly.  Remove the bandaid after 24 hours. If there is minor oozing, apply another bandaid and remove it after 12 hours.  It is normal to have a small bruise or pea size lump at the site.  You may shower tomorrow.  Do NOT take a bath, or use a hot tub or pool for at least 3 days. Do NOT scrub the site. Do not use lotion or powder near the puncture site.     Activity:            For 2 days:  No stooping or squatting  Do NOT do any heavy activity such as exercise, lifting, or straining.   No housework, yard work or any activity that make you sweat  Do NOT lift more than 10 pounds    Bleeding:    If you start bleeding from the site in your groin, lie down flat and press firmly on/above the site for 10 minutes.   Once bleeding stops, lay flat for 2 hours.   [facility] as soon as you can.       Call 911 right away if you have heavy bleeding or bleeding that does not stop.      Medicines:    If you are on Metformin (Glucophage) and your GFR (kidney function level) is >30, you may continue taking your Metformin.  If you are on Metformin (Glucophage) and your GFR (kidney function level) is <30, do not restart the Metformin for 48 hours after your procedure. Check with your primary care giver before restarting the Metformin to see if you need to have blood drawn to recheck your kidney  function (GFR).  If you are taking an antiplatelet medication such as Plavix, do not stop taking it until you talk to your provider.     Take your medications, including blood thinners, unless your provider tells you not to.    If you take Coumadin (Warfarin), have your INR checked by your provider in  3-5 days. Call your clinic to schedule this.  If you have stopped any medicines, check with your provider about when to restart them.        Follow Up Appointments:    Follow [facility] with [facility] as directed.    Call the clinic if:    You have increased pain or a large or growing hard lump around the site.  The site is red, swollen, hot or tender.  Blood or fluid is draining from the site.  You have chills or a fever greater than 101 F (38 C).  Your leg feels numb, cool or changes color.  You have hives, a rash or unusual itching.  New pain in the back or belly that you cannot control with Tylenol.  Any questions or concerns.    Other Instructions:    If you received a stent - carry your stent card with you at all times.      If you have questions or your original symptoms do not improve, call:         [facility] @ [phone]

## 2023-05-16 NOTE — PRE-PROCEDURE
GENERAL PRE-PROCEDURE:   Procedure:  Left leg angiogram with possible angioplasty with shock wave balloon with moderate sedation  Date/Time:  5/16/2023 8:25 AM    Written consent obtained?: Yes    Risks and benefits: Risks, benefits and alternatives were discussed    Consent given by:  Patient  Patient states understanding of procedure being performed: Yes    Patient's understanding of procedure matches consent: Yes    Procedure consent matches procedure scheduled: Yes    Expected level of sedation:  Moderate  Appropriately NPO:  Yes  ASA Class:  3  Mallampati  :  Grade 2- soft palate, base of uvula, tonsillar pillars, and portion of posterior pharyngeal wall visible  Lungs:  Lungs clear with good breath sounds bilaterally  Heart:  Normal heart sounds and rate and systolic murmur  History & Physical reviewed:  History and physical reviewed and no updates needed  Statement of review:  I have reviewed the lab findings, diagnostic data, medications, and the plan for sedation  Total time: 20 minutes    Thanks [facility] Interventional Radiology CNP ([phone]) (phone [phone])

## 2023-05-16 NOTE — PROGRESS NOTES
Care Suites Admission Nursing Note    Patient Information  Name: Jaylen De La Vega  Age: 77 year old  Reason for admission: lower ext angiogram  Care Suites arrival time: 0745    Visitor Information  Name: n/a  Informed of visitor restrictions: N/A  2 visitor allowed per patient   Visitor must wear a mask    Patient Admission/Assessment   Pre-procedure assessment complete: Yes  If abnormal assessment/labs, provider notified: N/A  NPO: Yes  Medications held per instructions/orders: Yes  Consent: obtained  If applicable, pregnancy test status: na  Patient oriented to room: Yes  Education/questions answered: Yes  Plan/other: proceed    Discharge Planning  Discharge name/phone number: [facility]   Overnight post sedation caregiver: DTR  Discharge location: home    Theo Mcdaniel RN

## 2023-05-16 NOTE — IR NOTE
Patient Name: Jaylen De La Vega  Medical Record Number: [id number]  Today's Date: 5/16/2023    Procedure: Left Leg Angiogram  Proceduralist: Dr. Orellana  Pathology present: no    Procedure Start: 0951  Procedure end: 1105  Sedation medications administered: Last Dose: 1042, Fentanyl 200 mcg, Versed 4 mg, Lidocaine 9 ml's.    Report given to: JONATHAN Venegas RN  : no    Other Notes: Pt will require 3 bedrest post procedure. Pt arrived to IR room 1 from cs 10. Consent reviewed. Pt denies any questions or concerns regarding procedure. Pt positioned supine and monitored per protocol. Pt tolerated procedure without any noted complications.

## 2023-05-16 NOTE — PROGRESS NOTES
1125: Pt returned from IR. Bandaid CDI to right groin puncture site. No oozing or hematoma noted. Area soft & flat. Pt denies pain. Pt instructed on activity restrictions while on bedrest. Verbal understanding received from pt.      1300: Discharge teaching & instructions given to pt w/ verbal understanding received. All questions & concerns addressed.

## 2023-05-17 ENCOUNTER — TELEPHONE (OUTPATIENT)
Dept: OTHER | Facility: CLINIC | Age: 78
End: 2023-05-17
Payer: COMMERCIAL

## 2023-05-17 DIAGNOSIS — E83.52 HYPERCALCEMIA: Primary | ICD-10-CM

## 2023-05-17 NOTE — TELEPHONE ENCOUNTER
Contacted patient.  Doing fine.  Sore in both legs, but manageable with Tylenol.  Puncture site is without bleeding or bruising.  Reviewed discharge instructions.      Will follow up in 1 month with repeat imaging,   Patient has contact number if questions or concerns.  Patito Kelly RN  IR nurse clinician  [phone]

## 2023-05-17 NOTE — TELEPHONE ENCOUNTER
Please fill out the amount, not within protocol to fill     Thank you     Monie Levin RN, BSN  [facility]

## 2023-05-18 DIAGNOSIS — I70.212 ATHEROSCLEROSIS OF NATIVE ARTERIES OF EXTREMITIES WITH INTERMITTENT CLAUDICATION, LEFT LEG (H): Primary | ICD-10-CM

## 2023-05-18 LAB
DEPRECATED CALCIDIOL+CALCIFEROL SERPL-MC: <63 UG/L (ref 20–75)
VITAMIN D2 SERPL-MCNC: <5 UG/L
VITAMIN D3 SERPL-MCNC: 58 UG/L

## 2023-05-18 RX ORDER — EXENATIDE 2 MG/.85ML
2 INJECTION, SUSPENSION, EXTENDED RELEASE SUBCUTANEOUS
Qty: 10.2 ML | Refills: 3 | Status: SHIPPED | OUTPATIENT
Start: 2023-05-18 | End: 2024-04-29

## 2023-05-18 NOTE — RESULT ENCOUNTER NOTE
Mr. De La Vega,    -Vitamin D level is normal and getting 1000 IU daily in your diet or supplements is recommended.  This is not affecting your elevated calcium level.    If you have further questions about the interpretation of your labs, [email or web address] is a good website to check out for further information.    Please contact the clinic if you have additional questions.  Thank you.    Sincerely,    Tj Verma MD

## 2023-05-18 NOTE — RESULT ENCOUNTER NOTE
Mr. De La Vega,    My apologies for taking so long to get back to you.  Your calcium continues to be elevated and I had hoped to check your parathyroid hormone at the same lab draw, but I was unable to add on the additional PTH lab for this.  I am concerned your elevated calcium may be coming from your hydrochlorothiazide blood pressure medicine.  I would like you to stop this for a week and we will get your calcium level checked again along with your parathyroid hormone to see if this has normalized.  If it has, we will need to figure out what other blood pressure medication we can use outside of hydrochlorothiazide.  We have a lot of choices, so I am not too worried about that.  If stopping the hydrochlorothiazide does not correct the elevated calcium, we will need to continue searching for other causes.  Send me a [employer] message if you have other questions Nik.    If you have further questions about the interpretation of your labs, [email or web address] is a good website to check out for further information.    Please contact the clinic if you have additional questions.  Thank you.    Sincerely,    Tj Verma MD

## 2023-05-24 ENCOUNTER — LAB (OUTPATIENT)
Dept: LAB | Facility: CLINIC | Age: 78
End: 2023-05-24
Payer: COMMERCIAL

## 2023-05-24 DIAGNOSIS — I10 ESSENTIAL HYPERTENSION: ICD-10-CM

## 2023-05-24 DIAGNOSIS — E83.52 HYPERCALCEMIA: ICD-10-CM

## 2023-05-24 LAB
CA-I BLD-MCNC: 4.8 MG/DL (ref 4.4–5.2)
CALCIUM SERPL-MCNC: 10.2 MG/DL (ref 8.8–10.2)
PTH-INTACT SERPL-MCNC: 32 PG/ML (ref 15–65)

## 2023-05-24 PROCEDURE — 83970 ASSAY OF PARATHORMONE: CPT

## 2023-05-24 PROCEDURE — 36415 COLL VENOUS BLD VENIPUNCTURE: CPT

## 2023-05-24 PROCEDURE — 82330 ASSAY OF CALCIUM: CPT

## 2023-05-24 PROCEDURE — 82310 ASSAY OF CALCIUM: CPT

## 2023-05-24 RX ORDER — LOSARTAN POTASSIUM 100 MG/1
TABLET ORAL
Qty: 90 TABLET | Refills: 3 | Status: SHIPPED | OUTPATIENT
Start: 2023-05-24 | End: 2024-05-16

## 2023-05-24 NOTE — TELEPHONE ENCOUNTER
Routing refill request to provider for review/approval because:  Per protocol- elevated BP:  05/16/23 (!) 142/73

## 2023-05-26 NOTE — RESULT ENCOUNTER NOTE
Mr. De La Vega,    -All of your labs are normal.  Your calcium levels have normalized.  Testing of your parathyroid (PTH) shows that this is also functioning and that hyperparathyroidism is not the cause of your prior elevated calcium levels.    -I see your blood pressure was elevated on 5/16 when you had your procedure, but I think we should watch this since the reading came from your procedure.  I won't be surprised if it goes up a bit Nik because we took you off your hydrochlorathiazide, so if you have a cuff at home please check it and reach out to me if it's above 140 mmHg for the top number or above 90 mmHg for the bottom number.  I've removed hydrochlorathiazide from your medication list in your chart.    If you have further questions about the interpretation of your labs, [email or web address] is a good website to check out for further information.    Please contact the clinic if you have additional questions.  Thank you.    Sincerely,    Tj Verma MD

## 2023-05-30 ENCOUNTER — PATIENT OUTREACH (OUTPATIENT)
Dept: CARE COORDINATION | Facility: CLINIC | Age: 78
End: 2023-05-30
Payer: COMMERCIAL

## 2023-06-09 DIAGNOSIS — E11.42 TYPE 2 DIABETES MELLITUS WITH DIABETIC POLYNEUROPATHY, WITHOUT LONG-TERM CURRENT USE OF INSULIN (H): ICD-10-CM

## 2023-06-09 DIAGNOSIS — E78.5 HYPERLIPIDEMIA LDL GOAL <70: ICD-10-CM

## 2023-06-10 RX ORDER — METFORMIN HCL 500 MG
TABLET, EXTENDED RELEASE 24 HR ORAL
Qty: 180 TABLET | Refills: 1 | Status: SHIPPED | OUTPATIENT
Start: 2023-06-10 | End: 2023-12-04

## 2023-06-10 RX ORDER — GABAPENTIN 300 MG/1
CAPSULE ORAL
Qty: 270 CAPSULE | Refills: 1 | Status: SHIPPED | OUTPATIENT
Start: 2023-06-10 | End: 2023-12-04

## 2023-06-10 RX ORDER — SIMVASTATIN 20 MG
20 TABLET ORAL AT BEDTIME
Qty: 90 TABLET | Refills: 3 | Status: SHIPPED | OUTPATIENT
Start: 2023-06-10 | End: 2024-06-07

## 2023-06-13 ENCOUNTER — PATIENT OUTREACH (OUTPATIENT)
Dept: CARE COORDINATION | Facility: CLINIC | Age: 78
End: 2023-06-13
Payer: COMMERCIAL

## 2023-06-28 ENCOUNTER — MYC MEDICAL ADVICE (OUTPATIENT)
Dept: FAMILY MEDICINE | Facility: CLINIC | Age: 78
End: 2023-06-28
Payer: COMMERCIAL

## 2023-06-28 DIAGNOSIS — J44.9 CHRONIC OBSTRUCTIVE PULMONARY DISEASE, UNSPECIFIED COPD TYPE (H): ICD-10-CM

## 2023-06-28 NOTE — TELEPHONE ENCOUNTER
Routing refill request to provider for review/approval because:   Asthma Maintenance Inhalers - Anticholinergics Failed 06/28/2023 12:25 PM   Protocol Details  Asthma control assessment score within normal limits in last 6 months        Monie Levin RN, BSN  [facility]

## 2023-06-29 RX ORDER — ALBUTEROL SULFATE 90 UG/1
2 AEROSOL, METERED RESPIRATORY (INHALATION) EVERY 6 HOURS PRN
Qty: 18 G | Refills: 1 | Status: SHIPPED | OUTPATIENT
Start: 2023-06-29 | End: 2023-08-14

## 2023-07-10 ENCOUNTER — HOSPITAL ENCOUNTER (OUTPATIENT)
Dept: ULTRASOUND IMAGING | Facility: CLINIC | Age: 78
Discharge: HOME | End: 2023-07-10
Attending: RADIOLOGY
Payer: COMMERCIAL

## 2023-07-10 DIAGNOSIS — I70.212 ATHEROSCLEROSIS OF NATIVE ARTERIES OF EXTREMITIES WITH INTERMITTENT CLAUDICATION, LEFT LEG (H): ICD-10-CM

## 2023-07-10 PROCEDURE — 93926 LOWER EXTREMITY STUDY: CPT | Mod: LT

## 2023-07-10 PROCEDURE — 93924 LWR XTR VASC STDY BILAT: CPT

## 2023-07-14 DIAGNOSIS — N40.1 BENIGN PROSTATIC HYPERPLASIA WITH URINARY FREQUENCY: ICD-10-CM

## 2023-07-14 DIAGNOSIS — R35.0 BENIGN PROSTATIC HYPERPLASIA WITH URINARY FREQUENCY: ICD-10-CM

## 2023-07-14 RX ORDER — TAMSULOSIN HYDROCHLORIDE 0.4 MG/1
0.4 CAPSULE ORAL EVERY EVENING
Qty: 90 CAPSULE | Refills: 3 | Status: SHIPPED | OUTPATIENT
Start: 2023-07-14 | End: 2024-07-10

## 2023-07-14 NOTE — TELEPHONE ENCOUNTER
Routing refill request to provider for review/approval because:  Blood pressure out of protocol range

## 2023-07-17 ENCOUNTER — OFFICE VISIT (OUTPATIENT)
Dept: OTHER | Facility: CLINIC | Age: 78
End: 2023-07-17
Attending: RADIOLOGY
Payer: COMMERCIAL

## 2023-07-17 VITALS — HEART RATE: 101 BPM | SYSTOLIC BLOOD PRESSURE: 163 MMHG | DIASTOLIC BLOOD PRESSURE: 76 MMHG

## 2023-07-17 DIAGNOSIS — I70.212 ATHEROSCLEROSIS OF NATIVE ARTERIES OF EXTREMITIES WITH INTERMITTENT CLAUDICATION, LEFT LEG (H): Primary | ICD-10-CM

## 2023-07-17 PROCEDURE — G0463 HOSPITAL OUTPT CLINIC VISIT: HCPCS

## 2023-07-17 NOTE — PROGRESS NOTES
[facility]        Patient is here for a  follow up.     Pt is currently taking Aspirin, Statin and Plavix.    BP (!) 163/76 (BP Location: Left arm, Patient Position: Chair, Cuff Size: Adult Regular)   Pulse 101     The provider has been notified that the patient has no concerns.     Questions patient would like addressed today are: N/A.    Refills are needed: N/A    Has homecare services and agency name:  Alicia Dsouza MA

## 2023-07-17 NOTE — PATIENT INSTRUCTIONS
Your imaging studies are much improved and the artery we opened up looks great.   Stay on plavix unless the bruising worsens and causes you pain.  Keep walking.  We will see you in 1 year with repeat imaging ultrasound.  Call me if symptoms come back again.    Patito Kelly RN  IR nurse clinician  [phone]
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
## 2023-08-03 ENCOUNTER — TELEPHONE (OUTPATIENT)
Dept: FAMILY MEDICINE | Facility: CLINIC | Age: 78
End: 2023-08-03
Payer: COMMERCIAL

## 2023-08-03 NOTE — TELEPHONE ENCOUNTER
Needs of attention regarding:  -Diabetes    Health Maintenance Topics with due status: Overdue       Topic Date Due    COVID-19 Vaccine 01/26/2023    LIPID 06/28/2023    MICROALBUMIN 06/28/2023    MEDICARE ANNUAL WELLNESS VISIT 06/29/2023    FALL RISK ASSESSMENT 06/29/2023    EYE EXAM 07/25/2023     Health Maintenance Topics with due status: Due Soon       Topic Date Due    A1C 08/09/2023    DIABETIC FOOT EXAM 09/01/2023       Communication:  Please abstract the following data from this visit with this patient into the appropriate field in Epic:    Tests that can be patient reported without a hard copy:        Other Tests found in the patient's chart through Chart Review/Care Everywhere:    Eye exam with ophthalmology done by this group [email or web address] on this date: 7/31/23  Hard copy sent to [employer]  Note to [employer]: If this section is blank, no results were found via Chart Review/Care Everywhere.

## 2023-08-10 ENCOUNTER — LAB (OUTPATIENT)
Dept: LAB | Facility: CLINIC | Age: 78
End: 2023-08-10
Payer: COMMERCIAL

## 2023-08-10 DIAGNOSIS — E11.42 TYPE 2 DIABETES MELLITUS WITH DIABETIC POLYNEUROPATHY, WITHOUT LONG-TERM CURRENT USE OF INSULIN (H): ICD-10-CM

## 2023-08-10 LAB — HBA1C MFR BLD: 6.9 % (ref 0–5.6)

## 2023-08-10 PROCEDURE — 36415 COLL VENOUS BLD VENIPUNCTURE: CPT

## 2023-08-10 PROCEDURE — 83036 HEMOGLOBIN GLYCOSYLATED A1C: CPT

## 2023-08-10 NOTE — RESULT ENCOUNTER NOTE
Mr. De La Vega,    Well that's a heckuvalot better!  Your A1C is now below our goal of less than 8.  Glad to see that walking is better after your vascular procedure - I imagine that has helped your sugars a lot.    If you have further questions about the interpretation of your labs, [email or web address] is a good website to check out for further information.    Please contact the clinic if you have additional questions.  Thank you and keep up the good work, Nik!    Sincerely,    Tj Verma MD

## 2023-08-14 DIAGNOSIS — E11.42 TYPE 2 DIABETES MELLITUS WITH DIABETIC POLYNEUROPATHY, WITHOUT LONG-TERM CURRENT USE OF INSULIN (H): ICD-10-CM

## 2023-08-14 DIAGNOSIS — J44.9 CHRONIC OBSTRUCTIVE PULMONARY DISEASE, UNSPECIFIED COPD TYPE (H): ICD-10-CM

## 2023-08-14 RX ORDER — ALBUTEROL SULFATE 90 UG/1
2 AEROSOL, METERED RESPIRATORY (INHALATION) EVERY 6 HOURS PRN
Qty: 8.5 G | Refills: 1 | Status: SHIPPED | OUTPATIENT
Start: 2023-08-14 | End: 2024-02-14

## 2023-08-14 NOTE — TELEPHONE ENCOUNTER
Routing refill request to provider for review/approval because:  Most recent ACT is outside protocol parameters.    Please advise, thanks.

## 2023-08-15 RX ORDER — DAPAGLIFLOZIN 10 MG/1
10 TABLET, FILM COATED ORAL DAILY
Qty: 90 TABLET | Refills: 1 | Status: SHIPPED | OUTPATIENT
Start: 2023-08-15 | End: 2024-02-22

## 2023-08-25 ENCOUNTER — LAB (OUTPATIENT)
Dept: LAB | Facility: CLINIC | Age: 78
End: 2023-08-25
Payer: COMMERCIAL

## 2023-08-25 DIAGNOSIS — E83.52 HYPERCALCEMIA: ICD-10-CM

## 2023-08-25 DIAGNOSIS — E11.42 TYPE 2 DIABETES MELLITUS WITH DIABETIC POLYNEUROPATHY, WITHOUT LONG-TERM CURRENT USE OF INSULIN (H): ICD-10-CM

## 2023-08-25 LAB
CHOLEST SERPL-MCNC: 136 MG/DL
CREAT UR-MCNC: 70.4 MG/DL
HDLC SERPL-MCNC: 39 MG/DL
LDLC SERPL CALC-MCNC: 65 MG/DL
MICROALBUMIN UR-MCNC: 26.5 MG/L
MICROALBUMIN/CREAT UR: 37.64 MG/G CR (ref 0–17)
NONHDLC SERPL-MCNC: 97 MG/DL
PTH-INTACT SERPL-MCNC: 27 PG/ML (ref 15–65)
TRIGL SERPL-MCNC: 160 MG/DL

## 2023-08-25 PROCEDURE — 83970 ASSAY OF PARATHORMONE: CPT

## 2023-08-25 PROCEDURE — 36415 COLL VENOUS BLD VENIPUNCTURE: CPT

## 2023-08-25 PROCEDURE — 80061 LIPID PANEL: CPT

## 2023-08-25 PROCEDURE — 82570 ASSAY OF URINE CREATININE: CPT

## 2023-08-25 PROCEDURE — 82043 UR ALBUMIN QUANTITATIVE: CPT

## 2023-08-25 NOTE — RESULT ENCOUNTER NOTE
Mr. De La Vega,    -Cholesterol levels are at your goal levels.  ADVISE: continuing your medication, a regular exercise program with at least 150 minutes of aerobic exercise per week, and eating a low saturated fat/low carbohydrate diet.  Also, you should recheck this fasting cholesterol panel in 12 months.  -Microalbumin (urine protein) level is elevated. This is suggestive of early damage to your kidneys from high blood pressure and diabetes.  ADVISE: avoiding anti-inflamatory agents such as ibuprofen (Advil, Motrin) or naproxen (Aleve) as much as possible, keeping your blood pressure in a normal range, and continuing your medication (losartan and Farxiga) that helps protect your kidneys.  Also, this should be rechecked in 1 year.     If you have further questions about the interpretation of your labs, [email or web address] is a good website to check out for further information.    Please contact the clinic if you have additional questions.  Thank you.    Sincerely,    Tj Verma MD

## 2023-08-26 ENCOUNTER — HEALTH MAINTENANCE LETTER (OUTPATIENT)
Age: 78
End: 2023-08-26

## 2023-09-01 ENCOUNTER — TRANSFERRED RECORDS (OUTPATIENT)
Dept: MULTI SPECIALTY CLINIC | Facility: CLINIC | Age: 78
End: 2023-09-01

## 2023-09-01 LAB — RETINOPATHY: NORMAL

## 2023-11-29 DIAGNOSIS — E11.9 TYPE 2 DIABETES MELLITUS WITHOUT COMPLICATION, WITHOUT LONG-TERM CURRENT USE OF INSULIN (H): ICD-10-CM

## 2023-12-02 DIAGNOSIS — J44.9 CHRONIC OBSTRUCTIVE PULMONARY DISEASE, UNSPECIFIED COPD TYPE (H): ICD-10-CM

## 2023-12-02 DIAGNOSIS — E11.42 TYPE 2 DIABETES MELLITUS WITH DIABETIC POLYNEUROPATHY, WITHOUT LONG-TERM CURRENT USE OF INSULIN (H): ICD-10-CM

## 2023-12-04 RX ORDER — GABAPENTIN 300 MG/1
CAPSULE ORAL
Qty: 270 CAPSULE | Refills: 1 | Status: SHIPPED | OUTPATIENT
Start: 2023-12-04 | End: 2024-06-16

## 2023-12-04 RX ORDER — TIOTROPIUM BROMIDE 18 UG/1
CAPSULE ORAL; RESPIRATORY (INHALATION)
Qty: 90 CAPSULE | Refills: 3 | Status: SHIPPED | OUTPATIENT
Start: 2023-12-04 | End: 2024-06-27

## 2023-12-04 RX ORDER — METFORMIN HCL 500 MG
TABLET, EXTENDED RELEASE 24 HR ORAL
Qty: 180 TABLET | Refills: 1 | Status: SHIPPED | OUTPATIENT
Start: 2023-12-04 | End: 2024-07-05

## 2023-12-15 ENCOUNTER — ANCILLARY PROCEDURE (OUTPATIENT)
Dept: GENERAL RADIOLOGY | Facility: CLINIC | Age: 78
End: 2023-12-15
Attending: NURSE PRACTITIONER
Payer: COMMERCIAL

## 2023-12-15 ENCOUNTER — OFFICE VISIT (OUTPATIENT)
Dept: FAMILY MEDICINE | Facility: CLINIC | Age: 78
End: 2023-12-15
Payer: COMMERCIAL

## 2023-12-15 ENCOUNTER — NURSE TRIAGE (OUTPATIENT)
Dept: FAMILY MEDICINE | Facility: CLINIC | Age: 78
End: 2023-12-15

## 2023-12-15 VITALS
DIASTOLIC BLOOD PRESSURE: 78 MMHG | OXYGEN SATURATION: 95 % | WEIGHT: 167.4 LBS | TEMPERATURE: 97.2 F | RESPIRATION RATE: 18 BRPM | BODY MASS INDEX: 23.44 KG/M2 | HEIGHT: 71 IN | HEART RATE: 107 BPM | SYSTOLIC BLOOD PRESSURE: 122 MMHG

## 2023-12-15 DIAGNOSIS — J44.1 COPD EXACERBATION (H): Primary | ICD-10-CM

## 2023-12-15 DIAGNOSIS — R05.1 ACUTE COUGH: ICD-10-CM

## 2023-12-15 DIAGNOSIS — E11.42 TYPE 2 DIABETES MELLITUS WITH DIABETIC POLYNEUROPATHY, WITHOUT LONG-TERM CURRENT USE OF INSULIN (H): ICD-10-CM

## 2023-12-15 PROCEDURE — 99214 OFFICE O/P EST MOD 30 MIN: CPT | Performed by: NURSE PRACTITIONER

## 2023-12-15 PROCEDURE — 71046 X-RAY EXAM CHEST 2 VIEWS: CPT | Mod: TC | Performed by: RADIOLOGY

## 2023-12-15 RX ORDER — RESPIRATORY SYNCYTIAL VIRUS VACCINE 120MCG/0.5
0.5 KIT INTRAMUSCULAR ONCE
Qty: 1 EACH | Refills: 0 | Status: CANCELLED | OUTPATIENT
Start: 2023-12-15 | End: 2023-12-15

## 2023-12-15 RX ORDER — DOXYCYCLINE 100 MG/1
100 CAPSULE ORAL 2 TIMES DAILY
Qty: 14 CAPSULE | Refills: 0 | Status: SHIPPED | OUTPATIENT
Start: 2023-12-15 | End: 2023-12-22

## 2023-12-15 ASSESSMENT — ENCOUNTER SYMPTOMS: COUGH: 1

## 2023-12-15 NOTE — PROGRESS NOTES
"  Assessment & Plan     COPD exacerbation (H)  Treat as below. Continue to use albuterol regularly until improving. Follow up if not improving.   - doxycycline monohydrate (MONODOX) 100 MG capsule  Dispense: 14 capsule; Refill: 0    Acute cough  - XR Chest 2 Views    Type 2 diabetes mellitus with diabetic polyneuropathy, without long-term current use of insulin (H)  Stable on regimen.      Review of the result(s) of each unique test - CXR appears normal on my read  Ordering of each unique test  Prescription drug management        Zina Childers, [facility] PRIOR MEDARDO Zaragoza is a 77 year old, presenting for the following health issues:  Cough        12/15/2023     8:43 AM   Additional Questions   Roomed by Olga GILLIAM       History of Present Illness       Reason for visit:  Coughing runny nose watery eyes  Symptom onset:  1-2 weeks ago  Symptom intensity:  Severe  Symptom progression:  Worsening  Had these symptoms before:  Yes  Has tried/received treatment for these symptoms:  Yes  Previous treatment was successful:  Yes  Prior treatment description:  Antibioc  What makes it worse:  Coughing    He eats 2-3 servings of fruits and vegetables daily.He consumes 0 sweetened beverage(s) daily.He exercises with enough effort to increase his heart rate 10 to 19 minutes per day.  He exercises with enough effort to increase his heart rate 7 days per week.   He is taking medications regularly.         December 15, 2023  Asya Bullock RN     MR    12/15/23  8:11 AM  Note  Zina VARGAS for today     Scheduled- patient advised of location, arrival and apt time           Asya Bullock RN     MR    12/15/23  8:09 AM  Note     Situation  Patient calls for an appointment. \" I think I have bronchitis or pneumonia\"     Background   Hx pf COPd- using in halers as directed   Has used albuterol 2 puffs every 4 hours.   Diabetic- tests multiple times a day- average has been 110   No recent travel "   No know exposures      Assessment   Cold and cough started one week ago.   Neg covid x2 at home, 12/12 and 12/14     Cough is now productive green/brown.   Frequency: every couple minutes     Sore throat from coughing      Deep breathing 5-10 minutes  Advised to walk around every hour.     Pulse 103 ( states pulse is normally over 100)  02 is 95%   Feels mild SOB while coughing     Recommendations  Advised will call back within about an hour this morning  if can be worked in schedule today     Reason for Disposition   Patient sounds very sick or weak to the triager   SEVERE coughing spells (e.g., whooping sound after coughing, vomiting after coughing)    Additional Information   Negative: Bluish (or gray) lips or face   Negative: SEVERE difficulty breathing (e.g., struggling for each breath, speaks in single words)   Negative: Rapid onset of cough and has hives   Negative: Coughing started suddenly after medicine, an allergic food or bee sting   Negative: Difficulty breathing after exposure to flames, smoke, or fumes   Negative: Sounds like a life-threatening emergency to the triager   Negative: Previous asthma attacks and this feels like asthma attack   Negative: Dry cough (non-productive; no sputum or minimal clear sputum) and within 14 days of COVID-19 Exposure   Negative: Chest pain present when not coughing   Negative: Passed out (i.e., fainted, collapsed and was not responding)   Negative: MODERATE difficulty breathing (e.g., speaks in phrases, SOB even at rest, pulse 100-120) and still present when not coughing   Negative: MILD difficulty breathing (e.g., minimal/no SOB at rest, SOB with walking, pulse <100) and still present when not coughing   Negative: Fever > 103 F (39.4 C)   Negative: Fever > 101 F (38.3 C) and over 60 years of age   Negative: Fever > 100.0 F (37.8 C) and has diabetes mellitus or a weak immune system (e.g., HIV positive, cancer chemotherapy, organ transplant, splenectomy, chronic  "steroids)   Negative: Fever > 100.0 F (37.8 C) and bedridden (e.g., CVA, chronic illness, recovering from surgery)   Negative: Increasing ankle swelling   Negative: Wheezing is present           Acute Illness  Acute illness concerns: cough   Negative covid at home tests x 2 12/12 &12/14  Onset/Duration: x 1 week   Symptoms:  Fever: No  Chills/Sweats: No  Headache (location?): No  Sinus Pressure: YES  Conjunctivitis:  YES- left eye mattering and some pain   Ear Pain: no  Rhinorrhea: YES  Congestion: YES  Sore Throat: YES  Cough: YES-productive of green sputum  Wheeze: No  Decreased Appetite: No  Nausea: No  Vomiting: No  Diarrhea: YES  Dysuria/Freq.: No  Dysuria or Hematuria: No  Fatigue/Achiness: YES  Sick/Strep Exposure: No  Therapies tried and outcome: dayquil , tylenol         Review of Systems   Respiratory:  Positive for cough.             Objective    /78   Pulse 107   Temp 97.2  F (36.2  C) (Tympanic)   Resp 18   Ht 1.803 m (5' 11\")   Wt 75.9 kg (167 lb 6.4 oz)   SpO2 95%   BMI 23.35 kg/m    Body mass index is 23.35 kg/m .  Physical Exam   GENERAL: healthy, alert and no distress  NECK: no adenopathy, no asymmetry, masses, or scars and thyroid normal to palpation  RESP: lungs clear to auscultation - no rales, rhonchi or wheezes and rales L lower posterior  CV: regular rate and rhythm, normal S1 S2, no S3 or S4, no murmur, click or rub, no peripheral edema and peripheral pulses strong  ABDOMEN: soft, nontender, no hepatosplenomegaly, no masses and bowel sounds normal  MS: no gross musculoskeletal defects noted, no edema            TERESITA Campos [address] [address]  [email or web address]   Office: [phone]                       "

## 2023-12-15 NOTE — TELEPHONE ENCOUNTER
"  Situation  Patient calls for an appointment. \" I think I have bronchitis or pneumonia\"    Background   Hx pf COPd- using in halers as directed   Has used albuterol 2 puffs every 4 hours.   Diabetic- tests multiple times a day- average has been 110   No recent travel   No know exposures     Assessment   Cold and cough started one week ago.   Neg covid x2 at home, 12/12 and 12/14    Cough is now productive green/brown.   Frequency: every couple minutes    Sore throat from coughing     Deep breathing 5-10 minutes  Advised to walk around every hour.    Pulse 103 ( states pulse is normally over 100)  02 is 95%   Feels mild SOB while coughing    Recommendations  Advised will call back within about an hour this morning  if can be worked in schedule today    Reason for Disposition   Patient sounds very sick or weak to the triager   SEVERE coughing spells (e.g., whooping sound after coughing, vomiting after coughing)    Additional Information   Negative: Bluish (or gray) lips or face   Negative: SEVERE difficulty breathing (e.g., struggling for each breath, speaks in single words)   Negative: Rapid onset of cough and has hives   Negative: Coughing started suddenly after medicine, an allergic food or bee sting   Negative: Difficulty breathing after exposure to flames, smoke, or fumes   Negative: Sounds like a life-threatening emergency to the triager   Negative: Previous asthma attacks and this feels like asthma attack   Negative: Dry cough (non-productive; no sputum or minimal clear sputum) and within 14 days of COVID-19 Exposure   Negative: Chest pain present when not coughing   Negative: Passed out (i.e., fainted, collapsed and was not responding)   Negative: MODERATE difficulty breathing (e.g., speaks in phrases, SOB even at rest, pulse 100-120) and still present when not coughing   Negative: MILD difficulty breathing (e.g., minimal/no SOB at rest, SOB with walking, pulse <100) and still present when not coughing   " Negative: Fever > 103 F (39.4 C)   Negative: Fever > 101 F (38.3 C) and over 60 years of age   Negative: Fever > 100.0 F (37.8 C) and has diabetes mellitus or a weak immune system (e.g., HIV positive, cancer chemotherapy, organ transplant, splenectomy, chronic steroids)   Negative: Fever > 100.0 F (37.8 C) and bedridden (e.g., CVA, chronic illness, recovering from surgery)   Negative: Increasing ankle swelling   Negative: Wheezing is present    Protocols used: Cough-A-OH

## 2023-12-20 ENCOUNTER — OFFICE VISIT (OUTPATIENT)
Dept: FAMILY MEDICINE | Facility: CLINIC | Age: 78
End: 2023-12-20
Payer: COMMERCIAL

## 2023-12-20 VITALS
DIASTOLIC BLOOD PRESSURE: 64 MMHG | BODY MASS INDEX: 23.1 KG/M2 | OXYGEN SATURATION: 96 % | WEIGHT: 165 LBS | HEIGHT: 71 IN | RESPIRATION RATE: 12 BRPM | TEMPERATURE: 98.5 F | SYSTOLIC BLOOD PRESSURE: 122 MMHG | HEART RATE: 103 BPM

## 2023-12-20 DIAGNOSIS — E83.52 HYPERCALCEMIA: ICD-10-CM

## 2023-12-20 DIAGNOSIS — Z00.00 ENCOUNTER FOR MEDICARE ANNUAL WELLNESS EXAM: Primary | ICD-10-CM

## 2023-12-20 DIAGNOSIS — E11.42 TYPE 2 DIABETES MELLITUS WITH DIABETIC POLYNEUROPATHY, WITHOUT LONG-TERM CURRENT USE OF INSULIN (H): ICD-10-CM

## 2023-12-20 DIAGNOSIS — I35.2 NONRHEUMATIC AORTIC INSUFFICIENCY WITH AORTIC STENOSIS: ICD-10-CM

## 2023-12-20 LAB
ALBUMIN SERPL BCG-MCNC: 4.4 G/DL (ref 3.5–5.2)
ALP SERPL-CCNC: 85 U/L (ref 40–150)
ALT SERPL W P-5'-P-CCNC: 19 U/L (ref 0–70)
ANION GAP SERPL CALCULATED.3IONS-SCNC: 12 MMOL/L (ref 7–15)
AST SERPL W P-5'-P-CCNC: 23 U/L (ref 0–45)
BILIRUB SERPL-MCNC: 0.3 MG/DL
BUN SERPL-MCNC: 20.9 MG/DL (ref 8–23)
CA-I BLD-MCNC: 5 MG/DL (ref 4.4–5.2)
CALCIUM SERPL-MCNC: 10.1 MG/DL (ref 8.8–10.2)
CHLORIDE SERPL-SCNC: 98 MMOL/L (ref 98–107)
CREAT SERPL-MCNC: 0.98 MG/DL (ref 0.67–1.17)
DEPRECATED HCO3 PLAS-SCNC: 27 MMOL/L (ref 22–29)
EGFRCR SERPLBLD CKD-EPI 2021: 79 ML/MIN/1.73M2
GLUCOSE SERPL-MCNC: 129 MG/DL (ref 70–99)
HBA1C MFR BLD: 7.1 % (ref 0–5.6)
POTASSIUM SERPL-SCNC: 4.3 MMOL/L (ref 3.4–5.3)
PROT SERPL-MCNC: 7.5 G/DL (ref 6.4–8.3)
PTH-INTACT SERPL-MCNC: 22 PG/ML (ref 15–65)
SODIUM SERPL-SCNC: 137 MMOL/L (ref 135–145)

## 2023-12-20 PROCEDURE — 82306 VITAMIN D 25 HYDROXY: CPT | Performed by: FAMILY MEDICINE

## 2023-12-20 PROCEDURE — 36415 COLL VENOUS BLD VENIPUNCTURE: CPT | Performed by: FAMILY MEDICINE

## 2023-12-20 PROCEDURE — 80053 COMPREHEN METABOLIC PANEL: CPT | Performed by: FAMILY MEDICINE

## 2023-12-20 PROCEDURE — G0439 PPPS, SUBSEQ VISIT: HCPCS | Performed by: FAMILY MEDICINE

## 2023-12-20 PROCEDURE — 83036 HEMOGLOBIN GLYCOSYLATED A1C: CPT | Performed by: FAMILY MEDICINE

## 2023-12-20 PROCEDURE — 82542 COL CHROMOTOGRAPHY QUAL/QUAN: CPT | Mod: 90 | Performed by: FAMILY MEDICINE

## 2023-12-20 PROCEDURE — 82330 ASSAY OF CALCIUM: CPT | Performed by: FAMILY MEDICINE

## 2023-12-20 PROCEDURE — 99000 SPECIMEN HANDLING OFFICE-LAB: CPT | Performed by: FAMILY MEDICINE

## 2023-12-20 PROCEDURE — 83970 ASSAY OF PARATHORMONE: CPT | Performed by: FAMILY MEDICINE

## 2023-12-20 PROCEDURE — 99214 OFFICE O/P EST MOD 30 MIN: CPT | Mod: 25 | Performed by: FAMILY MEDICINE

## 2023-12-20 RX ORDER — NITROGLYCERIN 0.4 MG/1
0.4 TABLET SUBLINGUAL EVERY 5 MIN PRN
Qty: 25 TABLET | Refills: 0 | Status: SHIPPED | OUTPATIENT
Start: 2023-12-20

## 2023-12-20 ASSESSMENT — ENCOUNTER SYMPTOMS
HEADACHES: 0
DIARRHEA: 0
WEAKNESS: 0
SORE THROAT: 0
FREQUENCY: 0
NERVOUS/ANXIOUS: 0
MYALGIAS: 0
FEVER: 0
COUGH: 1
PARESTHESIAS: 0
HEARTBURN: 0
ARTHRALGIAS: 0
DIZZINESS: 0
DYSURIA: 0
NAUSEA: 0
EYE PAIN: 1
CHILLS: 0
HEMATOCHEZIA: 0
ABDOMINAL PAIN: 0
SHORTNESS OF BREATH: 0
PALPITATIONS: 0
HEMATURIA: 0
JOINT SWELLING: 0

## 2023-12-20 ASSESSMENT — PAIN SCALES - GENERAL: PAINLEVEL: NO PAIN (0)

## 2023-12-20 ASSESSMENT — ACTIVITIES OF DAILY LIVING (ADL): CURRENT_FUNCTION: NO ASSISTANCE NEEDED

## 2023-12-20 NOTE — PATIENT INSTRUCTIONS
Patient Education   Personalized Prevention Plan  You are due for the preventive services outlined below.  Your care team is available to assist you in scheduling these services.  If you have already completed any of these items, please share that information with your care team to update in your medical record.  Health Maintenance Due   Topic Date Due     RSV VACCINE (Pregnancy & 60+) (1 - 1-dose 60+ series) Never done     Eye Exam  07/25/2023     Diabetic Foot Exam  09/01/2023     A1C Lab  11/10/2023

## 2023-12-20 NOTE — PROGRESS NOTES
"SUBJECTIVE:   Nik is a 78 year old, presenting for the following:  Medicare Visit        12/20/2023     9:16 AM   Additional Questions   Roomed by RERE ISABEL   Accompanied by SELF       Are you in the first 12 months of your Medicare coverage?  No    Healthy Habits:     In general, how would you rate your overall health?  Good    Frequency of exercise:  6-7 days/week    Duration of exercise:  45-60 minutes    Do you usually eat at least 4 servings of fruit and vegetables a day, include whole grains    & fiber and avoid regularly eating high fat or \"junk\" foods?  Yes    Taking medications regularly:  Yes    Medication side effects:  None    Ability to successfully perform activities of daily living:  No assistance needed    Home Safety:  No safety concerns identified    Hearing Impairment:  No hearing concerns    In the past 6 months, have you been bothered by leaking of urine?  No    In general, how would you rate your overall mental or emotional health?  Excellent    Additional concerns today:  No  Put on doxycycline for a sinus infection about 10 days ago.  Not feeling completely better, but has only one day left of antibiotics.   Afebrile.  No shortness of breath.     Today's PHQ-2 Score:       12/20/2023     9:11 AM   PHQ-2 ( 1999 [employer])   Q1: Little interest or pleasure in doing things 0   Q2: Feeling down, depressed or hopeless 0   PHQ-2 Score 0   Q1: Little interest or pleasure in doing things Not at all   Q2: Feeling down, depressed or hopeless Not at all   PHQ-2 Score 0       Have you ever done Advance Care Planning? (For example, a Health Directive, POLST, or a discussion with a medical provider or your loved ones about your wishes): Yes, advance care planning is on file.       Fall risk  Fallen 2 or more times in the past year?: No  Any fall with injury in the past year?: No    Cognitive Screening   1) Repeat 3 items (banana, sunrise, chair)       2) Clock draw:   NORMAL  3) 3 item recall:   Recalls 3 " objects  Results: 3 items recalled: COGNITIVE IMPAIRMENT LESS LIKELY    Mini-CogTM Copyright GERBER Willams. Licensed by the author for use in [facility]; reprinted with permission (arabella@[facility]). All rights reserved.        Reviewed and updated as needed this visit by clinical staff   Tobacco  Allergies  Meds  Problems             Reviewed and updated as needed this visit by Provider      Problems            Social History     Tobacco Use    Smoking status: Former     Packs/day: 0.50     Years: 50.00     Additional pack years: 0.00     Total pack years: 25.00     Types: Cigarettes     Start date: 1962     Quit date: 2012     Years since quittin.9    Smokeless tobacco: Never   Substance Use Topics    Alcohol use: Yes     Comment: 3 per week           2023     9:10 AM   Alcohol Use   Prescreen: >3 drinks/day or >7 drinks/week? No     Do you have a current opioid prescription? No  Do you use any other controlled substances or medications that are not prescribed by a provider? None          Diabetes Follow-up    How often are you checking your blood sugar? Continuous glucose monitor  Have you had any blood sugars above 200?  Yes after larger meals   Have you had any blood sugars below 70?  No  What symptoms do you notice when your blood sugar is low?  None  What concerns do you have today about your diabetes? None   Do you have any of these symptoms? (Select all that apply)  Neuropathy and redness in feet   Have you had a diabetic eye exam in the last 12 months? Yes- Date of last eye exam: [facility],  Location: 2023   Advised things look fine and to follow up in one year.            Hyperlipidemia Follow-Up    Are you regularly taking any medication or supplement to lower your cholesterol?   Yes- simvastatin  Are you having muscle aches or other side effects that you think could be caused by your cholesterol lowering medication?  No    Hypertension Follow-up    Do you check your  blood pressure regularly outside of the clinic? Yes   Are you following a low salt diet? Yes  Are your blood pressures ever more than 140 on the top number (systolic) OR more   than 90 on the bottom number (diastolic), for example 140/90? No    BP Readings from Last 2 Encounters:   12/20/23 122/64   12/15/23 122/78     Hemoglobin A1C (%)   Date Value   08/10/2023 6.9 (H)   05/09/2023 7.6 (H)   05/26/2021 7.8 (H)   03/15/2021 8.5 (H)     LDL Cholesterol Calculated (mg/dL)   Date Value   08/25/2023 65   06/28/2022 60   05/26/2021 48   06/12/2020 76       Current providers sharing in care for this patient include:   Patient Care Team:  Tj Verma Jr., MD as PCP - General (Family Practice)  Stephie Ruiz, RN as Diabetes Educator (Diabetes Education)  Nayana Jimenez RN as Diabetes Educator (Diabetes Education)  Tj Verma Jr., MD as Assigned PCP  Ramón Orellana MD as Assigned Heart and Vascular Provider  Ngoc Elkins DPM, Podiatry/Foot and Ankle Surgery as Assigned Musculoskeletal Provider    The following health maintenance items are reviewed in Epic and correct as of today:  Health Maintenance   Topic Date Due    RSV VACCINE (Pregnancy & 60+) (1 - 1-dose 60+ series) Never done    EYE EXAM  07/25/2023    DIABETIC FOOT EXAM  09/01/2023    A1C  11/10/2023    ANNUAL REVIEW OF HM ORDERS  03/15/2024    BMP  05/16/2024    LIPID  08/25/2024    MICROALBUMIN  08/25/2024    MEDICARE ANNUAL WELLNESS VISIT  12/20/2024    FALL RISK ASSESSMENT  12/20/2024    ECHO COMPLETE  07/15/2025    ADVANCE CARE PLANNING  12/20/2028    DTAP/TDAP/TD IMMUNIZATION (3 - Td or Tdap) 05/12/2033    SPIROMETRY  Completed    HEPATITIS C SCREENING  Completed    COPD ACTION PLAN  Completed    PHQ-2 (once per calendar year)  Completed    INFLUENZA VACCINE  Completed    Pneumococcal Vaccine: 65+ Years  Completed    ZOSTER IMMUNIZATION  Completed    COVID-19 Vaccine  Completed    IPV IMMUNIZATION  Aged Out    HPV IMMUNIZATION   "Aged Out    MENINGITIS IMMUNIZATION  Aged Out    RSV MONOCLONAL ANTIBODY  Aged Out    COLORECTAL CANCER SCREENING  Discontinued    LUNG CANCER SCREENING  Discontinued               Review of Systems   Constitutional:  Negative for chills and fever.   HENT:  Positive for congestion. Negative for ear pain, hearing loss and sore throat.    Eyes:  Positive for pain. Negative for visual disturbance.   Respiratory:  Positive for cough. Negative for shortness of breath.    Cardiovascular:  Negative for chest pain, palpitations and peripheral edema.   Gastrointestinal:  Negative for abdominal pain, diarrhea, heartburn, hematochezia and nausea.   Genitourinary:  Positive for impotence. Negative for dysuria, frequency, genital sores, hematuria, penile discharge and urgency.   Musculoskeletal:  Negative for arthralgias, joint swelling and myalgias.   Skin:  Negative for rash.   Neurological:  Negative for dizziness, weakness, headaches and paresthesias.   Psychiatric/Behavioral:  Negative for mood changes. The patient is not nervous/anxious.          OBJECTIVE:   /64   Pulse 103   Temp 98.5  F (36.9  C) (Tympanic)   Resp 12   Ht 1.803 m (5' 11\")   Wt 74.8 kg (165 lb)   SpO2 96%   BMI 23.01 kg/m   Estimated body mass index is 23.01 kg/m  as calculated from the following:    Height as of this encounter: 1.803 m (5' 11\").    Weight as of this encounter: 74.8 kg (165 lb).  Physical Exam  GENERAL: healthy, alert and no distress  EYES: Eyes grossly normal to inspection, PERRL and conjunctivae and sclerae normal  HENT: ear canals and TM's normal, nose and mouth without ulcers or lesions  NECK: no adenopathy, no asymmetry, masses, or scars and thyroid normal to palpation  RESP: lungs clear to auscultation - no rales, rhonchi or wheezes  CV: regular rates and rhythm, normal S1 S2, no S3 or S4, grade 2/6 systolic murmur heard best over the RUSB, peripheral pulses strong, and no peripheral edema  ABDOMEN: soft, nontender, no " hepatosplenomegaly, no masses and bowel sounds normal  MS: no gross musculoskeletal defects noted, no edema  SKIN: no suspicious lesions or rashes  NEURO: Normal strength and tone, mentation intact and speech normal  PSYCH: mentation appears normal, affect normal/bright  Diabetic foot exam: normal DP and PT pulses, no trophic changes or ulcerative lesions, normal sensory exam, and normal monofilament exam, except for findings noted on diabetic foot graphical image.      1, 2, 3, 5, 6, 8, 9 on left and 1, 2, 3, 4, 5, 7, 9 on right.        Diagnostic Test Results:  Labs reviewed in Epic    ASSESSMENT / PLAN:   (Z00.00) Encounter for Medicare annual wellness exam  (primary encounter diagnosis)  Comment:   Plan: Recheck in one year.    (E11.42) Type 2 diabetes mellitus with diabetic polyneuropathy, without long-term current use of insulin (H)  Comment: Previously under very good control.  Plan: Hemoglobin A1c        Continue present cares.  He advises me he is walking 5 miles most days of the week I encouraged him to continue with this.  Recheck in 6 months.    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  He has not used his nitroglycerin.  Refill was given today given that it is 1 year hold.  Plan: nitroGLYcerin (NITROSTAT) 0.4 MG sublingual         tablet            He has had problems with hypercalcemia in the recent past.  We will proceed with CMP, ionized calcium, parathyroid hormone, parathyroid hormone related peptide and vitamin D testing to further search for underlying etiologies.          COUNSELING:  Reviewed preventive health counseling, as reflected in patient instructions        He reports that he quit smoking about 11 years ago. His smoking use included cigarettes. He started smoking about 61 years ago. He has a 25 pack-year smoking history. He has never used smokeless tobacco.      Appropriate preventive services  were discussed with this patient, including applicable screening as appropriate for fall prevention, nutrition, physical activity, Tobacco-use cessation, weight loss and cognition.  Checklist reviewing preventive services available has been given to the patient.    Reviewed patients plan of care and provided an AVS. The Basic Care Plan (routine screening as documented in Health Maintenance) for Jaylen meets the Care Plan requirement. This Care Plan has been established and reviewed with the Patient.          Tj Verma Jr, MD  [facility] [place]    Identified Health Risks:  I have reviewed Opioid Use Disorder and Substance Use Disorder risk factors and made any needed referrals.

## 2023-12-21 NOTE — RESULT ENCOUNTER NOTE
Mr. De La Vega,    -Liver and gallbladder tests (ALT,AST, Alk phos,bilirubin) are normal.  -Kidney function (GFR) is normal.  -Sodium is normal.  -Potassium is normal.  -Calcium is normal.  -Glucose is elevated due to your diabetes.  -Calcium (ionized) is also normal.  -Parathyroid hormone (PTH - regulates calcium levels) is normal.    If you have further questions about the interpretation of your labs, [email or web address] is a good website to check out for further information.    Please contact the clinic if you have additional questions.  Thank you.    Sincerely,    Tj Verma MD

## 2023-12-25 LAB — PTH RELATED PROT SERPL-SCNC: <2 PMOL/L

## 2023-12-27 NOTE — RESULT ENCOUNTER NOTE
Mr. De La Vega,    -All of your labs are normal.    If you have further questions about the interpretation of your labs, [email or web address] is a good website to check out for further information.    Please contact the clinic if you have additional questions.  Thank you.    Sincerely,    Tj Verma MD

## 2023-12-30 LAB
DEPRECATED CALCIDIOL+CALCIFEROL SERPL-MC: <66 UG/L (ref 20–75)
VITAMIN D2 SERPL-MCNC: <5 UG/L
VITAMIN D3 SERPL-MCNC: 61 UG/L

## 2024-01-03 NOTE — RESULT ENCOUNTER NOTE
Mr. De La Vega,    -Vitamin D level is normal and getting 1000 IU daily in your diet or supplements is recommended.     If you have further questions about the interpretation of your labs, [email or web address] is a good website to check out for further information.    Please contact the clinic if you have additional questions.  Thank you.    Sincerely,    Tj Verma MD

## 2024-02-14 DIAGNOSIS — J44.9 CHRONIC OBSTRUCTIVE PULMONARY DISEASE, UNSPECIFIED COPD TYPE (H): ICD-10-CM

## 2024-02-14 RX ORDER — ALBUTEROL SULFATE 90 UG/1
2 AEROSOL, METERED RESPIRATORY (INHALATION) EVERY 6 HOURS PRN
Qty: 18 G | Refills: 1 | Status: SHIPPED | OUTPATIENT
Start: 2024-02-14 | End: 2024-06-27

## 2024-02-14 NOTE — TELEPHONE ENCOUNTER
Chart reviewed.  Rx sent to pt's preferred pharmacy.       [facility]  [id number] [place] [facility] [place] [address] [address]    Tj Verma MD

## 2024-02-15 ENCOUNTER — MYC REFILL (OUTPATIENT)
Dept: FAMILY MEDICINE | Facility: CLINIC | Age: 79
End: 2024-02-15
Payer: COMMERCIAL

## 2024-02-15 DIAGNOSIS — R35.0 BENIGN PROSTATIC HYPERPLASIA WITH URINARY FREQUENCY: ICD-10-CM

## 2024-02-15 DIAGNOSIS — N40.1 BENIGN PROSTATIC HYPERPLASIA WITH URINARY FREQUENCY: ICD-10-CM

## 2024-02-15 RX ORDER — TAMSULOSIN HYDROCHLORIDE 0.4 MG/1
0.4 CAPSULE ORAL EVERY EVENING
Qty: 90 CAPSULE | Refills: 3 | OUTPATIENT
Start: 2024-02-15

## 2024-02-22 DIAGNOSIS — E11.42 TYPE 2 DIABETES MELLITUS WITH DIABETIC POLYNEUROPATHY, WITHOUT LONG-TERM CURRENT USE OF INSULIN (H): ICD-10-CM

## 2024-02-22 RX ORDER — DAPAGLIFLOZIN 10 MG/1
10 TABLET, FILM COATED ORAL DAILY
Qty: 90 TABLET | Refills: 2 | Status: SHIPPED | OUTPATIENT
Start: 2024-02-22

## 2024-03-01 DIAGNOSIS — I73.9 LEFT LEG CLAUDICATION (H): ICD-10-CM

## 2024-03-01 DIAGNOSIS — I70.212 ATHEROSCLEROSIS OF NATIVE ARTERIES OF EXTREMITIES WITH INTERMITTENT CLAUDICATION, LEFT LEG (H): ICD-10-CM

## 2024-03-02 RX ORDER — CLOPIDOGREL BISULFATE 75 MG/1
75 TABLET ORAL DAILY
Qty: 90 TABLET | Refills: 3 | Status: SHIPPED | OUTPATIENT
Start: 2024-03-02

## 2024-03-02 NOTE — TELEPHONE ENCOUNTER
Chart reviewed.  Rx sent to pt's preferred pharmacy.       [facility]  [id number] [place] [facility] [place] [address] [address]    Tj Verma MD     O-L Flap Text: The defect edges were debeveled with a #15 scalpel blade.  Given the location of the defect, shape of the defect and the proximity to free margins an O-L flap was deemed most appropriate.  Using a sterile surgical marker, an appropriate advancement flap was drawn incorporating the defect and placing the expected incisions within the relaxed skin tension lines where possible.    The area thus outlined was incised deep to adipose tissue with a #15 scalpel blade.  The skin margins were undermined to an appropriate distance in all directions utilizing iris scissors.

## 2024-03-23 ENCOUNTER — HEALTH MAINTENANCE LETTER (OUTPATIENT)
Age: 79
End: 2024-03-23

## 2024-04-29 DIAGNOSIS — E11.42 TYPE 2 DIABETES MELLITUS WITH DIABETIC POLYNEUROPATHY, WITHOUT LONG-TERM CURRENT USE OF INSULIN (H): ICD-10-CM

## 2024-04-29 RX ORDER — EXENATIDE 2 MG/.85ML
2 INJECTION, SUSPENSION, EXTENDED RELEASE SUBCUTANEOUS
Qty: 10.2 ML | Refills: 0 | Status: SHIPPED | OUTPATIENT
Start: 2024-04-29 | End: 2024-07-10

## 2024-05-09 NOTE — PATIENT INSTRUCTIONS
Follow-up in 6 months with repeat ABIs with exercise and arterial duplex of the left popliteal artery.  Contact us sooner if you develop claudication symptoms.  Remain on Plavix and aspirin.  This will likely be long-term if tolerated.   English

## 2024-05-16 DIAGNOSIS — I10 ESSENTIAL HYPERTENSION: ICD-10-CM

## 2024-05-16 RX ORDER — LOSARTAN POTASSIUM 100 MG/1
TABLET ORAL
Qty: 90 TABLET | Refills: 1 | Status: SHIPPED | OUTPATIENT
Start: 2024-05-16

## 2024-06-07 DIAGNOSIS — E78.5 HYPERLIPIDEMIA LDL GOAL <70: ICD-10-CM

## 2024-06-07 RX ORDER — SIMVASTATIN 20 MG
20 TABLET ORAL AT BEDTIME
Qty: 90 TABLET | Refills: 0 | Status: SHIPPED | OUTPATIENT
Start: 2024-06-07 | End: 2024-09-03

## 2024-06-15 DIAGNOSIS — E11.42 TYPE 2 DIABETES MELLITUS WITH DIABETIC POLYNEUROPATHY, WITHOUT LONG-TERM CURRENT USE OF INSULIN (H): ICD-10-CM

## 2024-06-16 RX ORDER — GABAPENTIN 300 MG/1
CAPSULE ORAL
Qty: 270 CAPSULE | Refills: 3 | Status: SHIPPED | OUTPATIENT
Start: 2024-06-16

## 2024-06-26 ENCOUNTER — HOSPITAL ENCOUNTER (OUTPATIENT)
Dept: ULTRASOUND IMAGING | Facility: CLINIC | Age: 79
Discharge: HOME | End: 2024-06-26
Attending: RADIOLOGY
Payer: COMMERCIAL

## 2024-06-26 ENCOUNTER — NURSE TRIAGE (OUTPATIENT)
Dept: FAMILY MEDICINE | Facility: CLINIC | Age: 79
End: 2024-06-26
Payer: COMMERCIAL

## 2024-06-26 DIAGNOSIS — I70.212 ATHEROSCLEROSIS OF NATIVE ARTERIES OF EXTREMITIES WITH INTERMITTENT CLAUDICATION, LEFT LEG (H): ICD-10-CM

## 2024-06-26 PROCEDURE — 93924 LWR XTR VASC STDY BILAT: CPT

## 2024-06-26 PROCEDURE — 93926 LOWER EXTREMITY STUDY: CPT | Mod: LT

## 2024-06-26 NOTE — TELEPHONE ENCOUNTER
Reason for Disposition   Back pain lasts > 2 weeks    Additional Information   Negative: Passed out (i.e., fainted, collapsed and was not responding)   Negative: Shock suspected (e.g., cold/pale/clammy skin, too weak to stand, low BP, rapid pulse)   Negative: Sounds like a life-threatening emergency to the triager   Negative: Major injury to the back (e.g., MVA, fall > 10 feet or 3 meters, penetrating injury, etc.)   Negative: Pain in the upper back over the ribs (rib cage) that radiates (travels) into the chest   Negative: Pain in the upper back over the ribs (rib cage) and worsened by coughing (or clearly increases with breathing)   Negative: Back pain during pregnancy   Negative: SEVERE back pain of sudden onset and age > 60 years   Negative: SEVERE abdominal pain (e.g., excruciating)   Negative: Abdominal pain and age > 60 years   Negative: Unable to urinate (or only a few drops) and bladder feels very full   Negative: Loss of bladder or bowel control (urine or bowel incontinence; wetting self, leaking stool) of new-onset   Negative: Numbness (loss of sensation) in groin or rectal area   Negative: Pain radiates into groin, scrotum   Negative: Blood in urine (red, pink, or tea-colored)   Negative: Vomiting and pain over lower ribs of back (i.e., flank - kidney area)   Negative: Weakness of a leg or foot (e.g., unable to bear weight, dragging foot)   Negative: Patient sounds very sick or weak to the triager   Negative: Fever > 100.4 F (38.0 C) and flank pain   Negative: Pain or burning with passing urine (urination)   Negative: SEVERE back pain (e.g., excruciating, unable to do any normal activities) and not improved after pain medicine and CARE ADVICE   Negative: Numbness in an arm or hand (i.e., loss of sensation) and upper back pain   Negative: Numbness in a leg or foot (i.e., loss of sensation)   Negative: High-risk adult (e.g., history of cancer, history of HIV, or history of IV Drug Use)   Negative: Soft  "tissue infection (e.g., abscess, cellulitis) or other serious infection (e.g., bacteremia) in last 2 weeks   Negative: Painful rash with multiple small blisters grouped together (i.e., dermatomal distribution or 'band' or 'stripe')   Negative: Pain radiates into the thigh or further down the leg, and in both legs   Negative: Age > 50 and no history of prior similar back pain    Answer Assessment - Initial Assessment Questions  1. ONSET: \"When did the pain begin?\"       About 2 weeks ago   2. LOCATION: \"Where does it hurt?\" (upper, mid or lower back)      Left lower back - over the kidney area     3. SEVERITY: \"How bad is the pain?\"  (e.g., Scale 1-10; mild, moderate, or severe)    - MILD (1-3): Doesn't interfere with normal activities.     - MODERATE (4-7): Interferes with normal activities or awakens from sleep.     - SEVERE (8-10): Excruciating pain, unable to do any normal activities.       Mild - moderate     4. PATTERN: \"Is the pain constant?\" (e.g., yes, no; constant, intermittent)       Comes and goes     5. RADIATION: \"Does the pain shoot into your legs or somewhere else?\"      None     6. CAUSE:  \"What do you think is causing the back pain?\"       Unknown     7. BACK OVERUSE:  \"Any recent lifting of heavy objects, strenuous work or exercise?\"      None     8. MEDICINES: \"What have you taken so far for the pain?\" (e.g., nothing, acetaminophen, NSAIDS)      Tylenol - not helps much     9. NEUROLOGIC SYMPTOMS: \"Do you have any weakness, numbness, or problems with bowel/bladder control?\"      Denies: weakness, numbness, loss of control of Bowel and bladder     10. OTHER SYMPTOMS: \"Do you have any other symptoms?\" (e.g., fever, abdomen pain, burning with urination, blood in urine)        Denies: abd pain, fevers, pain with urination, blood in urine, frequency, urgency     11. PREGNANCY: \"Is there any chance you are pregnant?\" \"When was your last menstrual period?\"        none    Protocols used: Back " Pain-A-OH

## 2024-06-27 ENCOUNTER — OFFICE VISIT (OUTPATIENT)
Dept: FAMILY MEDICINE | Facility: CLINIC | Age: 79
End: 2024-06-27
Payer: COMMERCIAL

## 2024-06-27 VITALS
HEIGHT: 71 IN | OXYGEN SATURATION: 96 % | TEMPERATURE: 96.2 F | BODY MASS INDEX: 22.55 KG/M2 | DIASTOLIC BLOOD PRESSURE: 66 MMHG | SYSTOLIC BLOOD PRESSURE: 128 MMHG | HEART RATE: 91 BPM | RESPIRATION RATE: 12 BRPM | WEIGHT: 161.1 LBS

## 2024-06-27 DIAGNOSIS — E11.42 TYPE 2 DIABETES MELLITUS WITH DIABETIC POLYNEUROPATHY, WITHOUT LONG-TERM CURRENT USE OF INSULIN (H): ICD-10-CM

## 2024-06-27 DIAGNOSIS — M54.6 ACUTE LEFT-SIDED THORACIC BACK PAIN: Primary | ICD-10-CM

## 2024-06-27 DIAGNOSIS — N23 KIDNEY PAIN: ICD-10-CM

## 2024-06-27 DIAGNOSIS — R30.0 DYSURIA: ICD-10-CM

## 2024-06-27 LAB
ALBUMIN SERPL BCG-MCNC: 4.7 G/DL (ref 3.5–5.2)
ALBUMIN UR-MCNC: NEGATIVE MG/DL
ALP SERPL-CCNC: 72 U/L (ref 40–150)
ALT SERPL W P-5'-P-CCNC: 13 U/L (ref 0–70)
ANION GAP SERPL CALCULATED.3IONS-SCNC: 13 MMOL/L (ref 7–15)
APPEARANCE UR: CLEAR
AST SERPL W P-5'-P-CCNC: 24 U/L (ref 0–45)
BILIRUB SERPL-MCNC: 0.6 MG/DL
BILIRUB UR QL STRIP: NEGATIVE
BUN SERPL-MCNC: 27 MG/DL (ref 8–23)
CALCIUM SERPL-MCNC: 10.4 MG/DL (ref 8.8–10.2)
CHLORIDE SERPL-SCNC: 101 MMOL/L (ref 98–107)
CHOLEST SERPL-MCNC: 137 MG/DL
COLOR UR AUTO: YELLOW
CREAT SERPL-MCNC: 1.05 MG/DL (ref 0.67–1.17)
CREAT UR-MCNC: 75.7 MG/DL
DEPRECATED HCO3 PLAS-SCNC: 25 MMOL/L (ref 22–29)
EGFRCR SERPLBLD CKD-EPI 2021: 73 ML/MIN/1.73M2
ERYTHROCYTE [DISTWIDTH] IN BLOOD BY AUTOMATED COUNT: 12.1 % (ref 10–15)
FASTING STATUS PATIENT QL REPORTED: YES
FASTING STATUS PATIENT QL REPORTED: YES
GLUCOSE SERPL-MCNC: 121 MG/DL (ref 70–99)
GLUCOSE UR STRIP-MCNC: >=1000 MG/DL
HBA1C MFR BLD: 7.3 % (ref 0–5.6)
HCT VFR BLD AUTO: 52.9 % (ref 40–53)
HDLC SERPL-MCNC: 43 MG/DL
HGB BLD-MCNC: 17.3 G/DL (ref 13.3–17.7)
HGB UR QL STRIP: NEGATIVE
KETONES UR STRIP-MCNC: NEGATIVE MG/DL
LDLC SERPL CALC-MCNC: 68 MG/DL
LEUKOCYTE ESTERASE UR QL STRIP: NEGATIVE
MCH RBC QN AUTO: 29.9 PG (ref 26.5–33)
MCHC RBC AUTO-ENTMCNC: 32.7 G/DL (ref 31.5–36.5)
MCV RBC AUTO: 91 FL (ref 78–100)
MICROALBUMIN UR-MCNC: 23.1 MG/L
MICROALBUMIN/CREAT UR: 30.52 MG/G CR (ref 0–17)
NITRATE UR QL: NEGATIVE
NONHDLC SERPL-MCNC: 94 MG/DL
PH UR STRIP: 5.5 [PH] (ref 5–7)
PLATELET # BLD AUTO: 198 10E3/UL (ref 150–450)
POTASSIUM SERPL-SCNC: 5 MMOL/L (ref 3.4–5.3)
PROT SERPL-MCNC: 7.5 G/DL (ref 6.4–8.3)
RBC # BLD AUTO: 5.79 10E6/UL (ref 4.4–5.9)
SODIUM SERPL-SCNC: 139 MMOL/L (ref 135–145)
SP GR UR STRIP: 1.02 (ref 1–1.03)
TRIGL SERPL-MCNC: 131 MG/DL
UROBILINOGEN UR STRIP-ACNC: 0.2 E.U./DL
WBC # BLD AUTO: 5.9 10E3/UL (ref 4–11)

## 2024-06-27 PROCEDURE — 81003 URINALYSIS AUTO W/O SCOPE: CPT | Performed by: NURSE PRACTITIONER

## 2024-06-27 PROCEDURE — 82043 UR ALBUMIN QUANTITATIVE: CPT | Performed by: NURSE PRACTITIONER

## 2024-06-27 PROCEDURE — 83036 HEMOGLOBIN GLYCOSYLATED A1C: CPT | Performed by: NURSE PRACTITIONER

## 2024-06-27 PROCEDURE — 36415 COLL VENOUS BLD VENIPUNCTURE: CPT | Performed by: NURSE PRACTITIONER

## 2024-06-27 PROCEDURE — 85027 COMPLETE CBC AUTOMATED: CPT | Performed by: NURSE PRACTITIONER

## 2024-06-27 PROCEDURE — 82570 ASSAY OF URINE CREATININE: CPT | Performed by: NURSE PRACTITIONER

## 2024-06-27 PROCEDURE — 80061 LIPID PANEL: CPT | Performed by: NURSE PRACTITIONER

## 2024-06-27 PROCEDURE — 99213 OFFICE O/P EST LOW 20 MIN: CPT | Performed by: NURSE PRACTITIONER

## 2024-06-27 PROCEDURE — 80053 COMPREHEN METABOLIC PANEL: CPT | Performed by: NURSE PRACTITIONER

## 2024-06-27 NOTE — PROGRESS NOTES
Assessment & Plan      Acute left-sided thoracic back pain  Given symptoms and normal urine more likely a musculoskeletal issue.  He will manage at home with heat ice Tylenol and follow-up with us within a week if ongoing.  If any worsening symptoms he needs to notify us more acutely.   - Albumin Random Urine Quantitative with Creat Ratio  - Comprehensive metabolic panel (BMP + Alb, Alk Phos, ALT, AST, Total. Bili, TP)  - CBC with platelets  - Hemoglobin A1c  - Albumin Random Urine Quantitative with Creat Ratio  - Comprehensive metabolic panel (BMP + Alb, Alk Phos, ALT, AST, Total. Bili, TP)  - CBC with platelets  - Hemoglobin A1c    Type 2 diabetes mellitus with diabetic polyneuropathy, without long-term current use of insulin (H)  Check labs today since fasting. Follow up with PCP in 1-2 weeks.   - Albumin Random Urine Quantitative with Creat Ratio  - Comprehensive metabolic panel (BMP + Alb, Alk Phos, ALT, AST, Total. Bili, TP)  - Hemoglobin A1c  - Albumin Random Urine Quantitative with Creat Ratio  - Lipid panel reflex to direct LDL Fasting  - Comprehensive metabolic panel (BMP + Alb, Alk Phos, ALT, AST, Total. Bili, TP)  - Hemoglobin A1c  - Lipid panel reflex to direct LDL Fasting    Dysuria  Normal urine today, a bit of glucose spilling. Patient fasted and not very hydrated today.   - UA Macroscopic with reflex to Microscopic and Culture - Lab Collect  - UA Macroscopic with reflex to Microscopic and Culture - Lab Collect    Kidney pain  More likely musculoskeletal but notify us if any worse symtpoms.   - Albumin Random Urine Quantitative with Creat Ratio  - Comprehensive metabolic panel (BMP + Alb, Alk Phos, ALT, AST, Total. Bili, TP)  - CBC with platelets  - Hemoglobin A1c  - Albumin Random Urine Quantitative with Creat Ratio  - Comprehensive metabolic panel (BMP + Alb, Alk Phos, ALT, AST, Total. Bili, TP)  - CBC with platelets  - Hemoglobin A1c              See Patient Instructions    Charly wilson  78 year old, presenting for the following health issues:  Musculoskeletal Problem        6/27/2024     7:55 AM   Additional Questions   Roomed by Stephany MCLEAN   Accompanied by Self     History of Present Illness       Reason for visit:  Kidney pain  Symptom onset:  1-2 weeks ago    He eats 2-3 servings of fruits and vegetables daily.He consumes 0 sweetened beverage(s) daily.He exercises with enough effort to increase his heart rate 30 to 60 minutes per day.  He exercises with enough effort to increase his heart rate 7 days per week.   He is taking medications regularly.       Triage note from 6/26/2024  /Reason for Disposition   /Back pain lasts > 2 weeks    Additional Information   Negative: Passed out (i.e., fainted, collapsed and was not responding)   Negative: Shock suspected (e.g., cold/pale/clammy skin, too weak to stand, low BP, rapid pulse)   Negative: Sounds like a life-threatening emergency to the triager   Negative: Major injury to the back (e.g., MVA, fall > 10 feet or 3 meters, penetrating injury, etc.)   Negative: Pain in the upper back over the ribs (rib cage) that radiates (travels) into the chest   Negative: Pain in the upper back over the ribs (rib cage) and worsened by coughing (or clearly increases with breathing)   Negative: Back pain during pregnancy   Negative: SEVERE back pain of sudden onset and age > 60 years   Negative: SEVERE abdominal pain (e.g., excruciating)   Negative: Abdominal pain and age > 60 years   Negative: Unable to urinate (or only a few drops) and bladder feels very full   Negative: Loss of bladder or bowel control (urine or bowel incontinence; wetting self, leaking stool) of new-onset   Negative: Numbness (loss of sensation) in groin or rectal area   Negative: Pain radiates into groin, scrotum   Negative: Blood in urine (red, pink, or tea-colored)   Negative: Vomiting and pain over lower ribs of back (i.e., flank - kidney area)   Negative: Weakness of a leg or foot (e.g., unable  "to bear weight, dragging foot)   Negative: Patient sounds very sick or weak to the triager   Negative: Fever > 100.4 F (38.0 C) and flank pain   Negative: Pain or burning with passing urine (urination)   Negative: SEVERE back pain (e.g., excruciating, unable to do any normal activities) and not improved after pain medicine and CARE ADVICE   Negative: Numbness in an arm or hand (i.e., loss of sensation) and upper back pain   Negative: Numbness in a leg or foot (i.e., loss of sensation)   Negative: High-risk adult (e.g., history of cancer, history of HIV, or history of IV Drug Use)   Negative: Soft tissue infection (e.g., abscess, cellulitis) or other serious infection (e.g., bacteremia) in last 2 weeks   Negative: Painful rash with multiple small blisters grouped together (i.e., dermatomal distribution or 'band' or 'stripe')   Negative: Pain radiates into the thigh or further down the leg, and in both legs   Negative: Age > 50 and no history of prior similar back pain    Answer Assessment - Initial Assessment Questions  1. ONSET: \"When did the pain begin?\"       About 2 weeks ago   2. LOCATION: \"Where does it hurt?\" (upper, mid or lower back)      Left lower back - over the kidney area     3. SEVERITY: \"How bad is the pain?\"  (e.g., Scale 1-10; mild, moderate, or severe)    - MILD (1-3): Doesn't interfere with normal activities.     - MODERATE (4-7): Interferes with normal activities or awakens from sleep.     - SEVERE (8-10): Excruciating pain, unable to do any normal activities.       Mild - moderate     4. PATTERN: \"Is the pain constant?\" (e.g., yes, no; constant, intermittent)       Comes and goes     5. RADIATION: \"Does the pain shoot into your legs or somewhere else?\"      None     6. CAUSE:  \"What do you think is causing the back pain?\"       Unknown     7. BACK OVERUSE:  \"Any recent lifting of heavy objects, strenuous work or exercise?\"      None     8. MEDICINES: \"What have you taken so far for the pain?\" " "(e.g., nothing, acetaminophen, NSAIDS)      Tylenol - not helps much     9. NEUROLOGIC SYMPTOMS: \"Do you have any weakness, numbness, or problems with bowel/bladder control?\"      Denies: weakness, numbness, loss of control of Bowel and bladder     10. OTHER SYMPTOMS: \"Do you have any other symptoms?\" (e.g., fever, abdomen pain, burning with urination, blood in urine)        Denies: abd pain, fevers, pain with urination, blood in urine, frequency, urgency     11. PREGNANCY: \"Is there any chance you are pregnant?\" \"When was your last menstrual period?\"        none    Protocols used: Back Pain-A-OH         Constitutional, HEENT, cardiovascular, pulmonary, GI, , musculoskeletal, neuro, skin, endocrine and psych systems are negative, except as otherwise noted.        Objective    /66   Pulse 91   Temp (!) 96.2  F (35.7  C) (Tympanic)   Resp 12   Ht 1.803 m (5' 11\")   Wt 73.1 kg (161 lb 1.6 oz)   SpO2 96%   BMI 22.47 kg/m    Body mass index is 22.47 kg/m .  Physical Exam   GENERAL: alert and no distress  NECK: no adenopathy, no asymmetry, masses, or scars  RESP: lungs clear to auscultation - no rales, rhonchi or wheezes  CV: regular rate and rhythm, normal S1 S2, no S3 or S4, no murmur, click or rub, no peripheral edema  ABDOMEN: soft, nontender, no hepatosplenomegaly, no masses and bowel sounds normal  MS: no gross musculoskeletal defects noted, no edema            Signed Electronically by: Zina Childers CNP    "

## 2024-07-01 ENCOUNTER — OFFICE VISIT (OUTPATIENT)
Dept: OTHER | Facility: CLINIC | Age: 79
End: 2024-07-01
Attending: RADIOLOGY
Payer: COMMERCIAL

## 2024-07-01 VITALS — DIASTOLIC BLOOD PRESSURE: 76 MMHG | SYSTOLIC BLOOD PRESSURE: 123 MMHG | HEART RATE: 93 BPM

## 2024-07-01 DIAGNOSIS — I70.212 ATHEROSCLEROSIS OF NATIVE ARTERIES OF EXTREMITIES WITH INTERMITTENT CLAUDICATION, LEFT LEG (H): Primary | ICD-10-CM

## 2024-07-01 PROCEDURE — G0463 HOSPITAL OUTPT CLINIC VISIT: HCPCS | Performed by: RADIOLOGY

## 2024-07-01 NOTE — PROGRESS NOTES
[facility]        Patient is here for a  follow up.    Pt is currently taking Aspirin, Statin, and Plavix.    /76 (BP Location: Left arm, Patient Position: Chair, Cuff Size: Adult Regular)   Pulse 93     The provider has been notified that the patient has no concerns.     Questions patient would like addressed today are: N/A.    Refills are needed: N/A    Has homecare services and agency name:  Alicia Dsouza MA
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
## 2024-07-02 NOTE — PATIENT INSTRUCTIONS
Contact us sooner if claudication symptoms worsen.  Follow up in 1 year with repeat ultrasound and ABIs.  These can be done at the [facility] location.  We also can proceed with virtual visit.   Patito Kelly RN  IR nurse clinician  [phone]

## 2024-07-04 DIAGNOSIS — E11.42 TYPE 2 DIABETES MELLITUS WITH DIABETIC POLYNEUROPATHY, WITHOUT LONG-TERM CURRENT USE OF INSULIN (H): ICD-10-CM

## 2024-07-05 RX ORDER — METFORMIN HCL 500 MG
TABLET, EXTENDED RELEASE 24 HR ORAL
Qty: 180 TABLET | Refills: 0 | Status: SHIPPED | OUTPATIENT
Start: 2024-07-05 | End: 2024-09-09

## 2024-07-10 ENCOUNTER — OFFICE VISIT (OUTPATIENT)
Dept: FAMILY MEDICINE | Facility: CLINIC | Age: 79
End: 2024-07-10
Attending: FAMILY MEDICINE
Payer: COMMERCIAL

## 2024-07-10 VITALS
OXYGEN SATURATION: 96 % | SYSTOLIC BLOOD PRESSURE: 126 MMHG | RESPIRATION RATE: 14 BRPM | DIASTOLIC BLOOD PRESSURE: 66 MMHG | HEART RATE: 103 BPM | WEIGHT: 159 LBS | TEMPERATURE: 98.1 F | BODY MASS INDEX: 22.26 KG/M2 | HEIGHT: 71 IN

## 2024-07-10 DIAGNOSIS — J43.9 PULMONARY EMPHYSEMA, UNSPECIFIED EMPHYSEMA TYPE (H): ICD-10-CM

## 2024-07-10 DIAGNOSIS — E11.42 TYPE 2 DIABETES MELLITUS WITH DIABETIC POLYNEUROPATHY, WITHOUT LONG-TERM CURRENT USE OF INSULIN (H): Primary | ICD-10-CM

## 2024-07-10 DIAGNOSIS — R35.0 BENIGN PROSTATIC HYPERPLASIA WITH URINARY FREQUENCY: ICD-10-CM

## 2024-07-10 DIAGNOSIS — N40.1 BENIGN PROSTATIC HYPERPLASIA WITH URINARY FREQUENCY: ICD-10-CM

## 2024-07-10 PROCEDURE — 99214 OFFICE O/P EST MOD 30 MIN: CPT | Performed by: FAMILY MEDICINE

## 2024-07-10 PROCEDURE — G2211 COMPLEX E/M VISIT ADD ON: HCPCS | Performed by: FAMILY MEDICINE

## 2024-07-10 RX ORDER — ALBUTEROL SULFATE 90 UG/1
2 AEROSOL, METERED RESPIRATORY (INHALATION) EVERY 6 HOURS PRN
Qty: 18 G | Refills: 2 | Status: SHIPPED | OUTPATIENT
Start: 2024-07-10

## 2024-07-10 RX ORDER — EXENATIDE 2 MG/.85ML
2 INJECTION, SUSPENSION, EXTENDED RELEASE SUBCUTANEOUS
Qty: 3.4 ML | Refills: 2 | Status: SHIPPED | OUTPATIENT
Start: 2024-07-10 | End: 2024-08-27

## 2024-07-10 RX ORDER — TIOTROPIUM BROMIDE 18 UG/1
18 CAPSULE ORAL; RESPIRATORY (INHALATION) DAILY
Qty: 90 CAPSULE | Refills: 4 | Status: SHIPPED | OUTPATIENT
Start: 2024-07-10

## 2024-07-10 ASSESSMENT — PAIN SCALES - GENERAL: PAINLEVEL: NO PAIN (0)

## 2024-07-10 NOTE — PROGRESS NOTES
Assessment & Plan     Type 2 diabetes mellitus with diabetic polyneuropathy, without long-term current use of insulin (H)  Excellent blood sugar control using a combination of Bydureon/exenatide, Farxiga/empagliflozin, metformin.  He is getting very good lifestyle feedback with his continuous glucose monitor.  Continues to follow a diabetic diet and exercise regularly.  Follow-up with me in 6 months.  - exenatide ER (BYDUREON BCISE) 2 MG/0.85ML auto-injector; Inject 2 mg subcutaneously every 7 days    Pulmonary emphysema, unspecified emphysema type (H)  He had some difficulties with coverage of his inhalers.  He has been without his tiotropium for a few weeks and is due for refill.  New prescription is sent over to his pharmacy.  Continue regular exercise and as he has been doing.  He stopped smoking several years ago.  - tiotropium (SPIRIVA) 18 MCG inhaled capsule; Inhale 1 capsule (18 mcg) into the lungs daily  - albuterol (PROAIR HFA/PROVENTIL HFA/VENTOLIN HFA) 108 (90 Base) MCG/ACT inhaler; Inhale 2 puffs into the lungs every 6 hours as needed for shortness of breath, wheezing or cough    Benign prostatic hyperplasia with urinary frequency  Really has not had many issues with this recently and in fact has stopped his tamsulosin.  Medication list is updated today.    The longitudinal plan of care for the diagnosis(es)/condition(s) as documented were addressed during this visit. Due to the added complexity in care, I will continue to support Nik in the subsequent management and with ongoing continuity of care.                Subjective   Nik is a 78 year old, presenting for the following health issues:  No chief complaint on file.        7/10/2024    10:23 AM   Additional Questions   Roomed by RERE ISABEL   Accompanied by SELF     HPI     Diabetes Follow-up     How often are you checking your blood sugar? Continuous glucose monitor  Have you had any blood sugars above 200?  Only once in the past several months  "  Have you had any blood sugars below 70?  No  What symptoms do you notice when your blood sugar is low?  None  What concerns do you have today about your diabetes? None   Do you have any of these symptoms? (Select all that apply)  Neuropathy and redness in feet   Have you had a diabetic eye exam in the last 12 months? Yes- Date of last eye exam: [facility],  Location: Sept 2023   Advised things look fine and to follow up in one year.    Hyperlipidemia Follow-Up     Are you regularly taking any medication or supplement to lower your cholesterol?   Yes- simvastatin  Are you having muscle aches or other side effects that you think could be caused by your cholesterol lowering medication?  No     Hypertension Follow-up     Do you check your blood pressure regularly outside of the clinic? Yes   Are you following a low salt diet? Yes  Are your blood pressures ever more than 140 on the top number (systolic) OR more       than 90 on the bottom number (diastolic), for example 140/90? No        BP Readings from Last 2 Encounters:   07/10/24 126/66   07/01/24 123/76     Hemoglobin A1C (%)   Date Value   06/27/2024 7.3 (H)   12/20/2023 7.1 (H)   05/26/2021 7.8 (H)   03/15/2021 8.5 (H)     LDL Cholesterol Calculated (mg/dL)   Date Value   06/27/2024 68   08/25/2023 65   05/26/2021 48   06/12/2020 76                 Objective    /66   Pulse 103   Temp 98.1  F (36.7  C) (Tympanic)   Resp 14   Ht 1.803 m (5' 11\")   Wt 72.1 kg (159 lb)   SpO2 96%   BMI 22.18 kg/m    Body mass index is 22.18 kg/m .  Physical Exam               Signed Electronically by: Tj Verma Jr, MD    "

## 2024-07-15 DIAGNOSIS — I70.212 ATHEROSCLEROSIS OF NATIVE ARTERIES OF EXTREMITIES WITH INTERMITTENT CLAUDICATION, LEFT LEG (H): Primary | ICD-10-CM

## 2024-07-24 ENCOUNTER — NURSE TRIAGE (OUTPATIENT)
Dept: FAMILY MEDICINE | Facility: CLINIC | Age: 79
End: 2024-07-24

## 2024-07-24 ENCOUNTER — HOSPITAL ENCOUNTER (EMERGENCY)
Facility: CLINIC | Age: 79
Discharge: HOME | End: 2024-07-24
Attending: EMERGENCY MEDICINE | Admitting: EMERGENCY MEDICINE
Payer: COMMERCIAL

## 2024-07-24 ENCOUNTER — APPOINTMENT (OUTPATIENT)
Dept: CT IMAGING | Facility: CLINIC | Age: 79
End: 2024-07-24
Attending: EMERGENCY MEDICINE
Payer: COMMERCIAL

## 2024-07-24 VITALS
DIASTOLIC BLOOD PRESSURE: 78 MMHG | WEIGHT: 157.81 LBS | HEART RATE: 98 BPM | BODY MASS INDEX: 22.09 KG/M2 | OXYGEN SATURATION: 99 % | RESPIRATION RATE: 14 BRPM | TEMPERATURE: 98.1 F | HEIGHT: 71 IN | SYSTOLIC BLOOD PRESSURE: 116 MMHG

## 2024-07-24 DIAGNOSIS — K62.5 RECTAL BLEEDING: ICD-10-CM

## 2024-07-24 LAB
ABO/RH(D): NORMAL
ALBUMIN SERPL BCG-MCNC: 4.2 G/DL (ref 3.5–5.2)
ALP SERPL-CCNC: 70 U/L (ref 40–150)
ALT SERPL W P-5'-P-CCNC: 15 U/L (ref 0–70)
ANION GAP SERPL CALCULATED.3IONS-SCNC: 15 MMOL/L (ref 7–15)
ANTIBODY SCREEN: NEGATIVE
AST SERPL W P-5'-P-CCNC: 17 U/L (ref 0–45)
BASOPHILS # BLD AUTO: 0 10E3/UL (ref 0–0.2)
BASOPHILS NFR BLD AUTO: 1 %
BILIRUB SERPL-MCNC: 0.4 MG/DL
BUN SERPL-MCNC: 22.4 MG/DL (ref 8–23)
CALCIUM SERPL-MCNC: 9.9 MG/DL (ref 8.8–10.4)
CHLORIDE SERPL-SCNC: 99 MMOL/L (ref 98–107)
CREAT SERPL-MCNC: 1.16 MG/DL (ref 0.67–1.17)
EGFRCR SERPLBLD CKD-EPI 2021: 65 ML/MIN/1.73M2
EOSINOPHIL # BLD AUTO: 0.1 10E3/UL (ref 0–0.7)
EOSINOPHIL NFR BLD AUTO: 1 %
ERYTHROCYTE [DISTWIDTH] IN BLOOD BY AUTOMATED COUNT: 12.2 % (ref 10–15)
GLUCOSE SERPL-MCNC: 99 MG/DL (ref 70–99)
HCO3 SERPL-SCNC: 24 MMOL/L (ref 22–29)
HCT VFR BLD AUTO: 42 % (ref 40–53)
HGB BLD-MCNC: 13.5 G/DL (ref 13.3–17.7)
HOLD SPECIMEN: NORMAL
HOLD SPECIMEN: NORMAL
IMM GRANULOCYTES # BLD: 0 10E3/UL
IMM GRANULOCYTES NFR BLD: 0 %
LYMPHOCYTES # BLD AUTO: 1.7 10E3/UL (ref 0.8–5.3)
LYMPHOCYTES NFR BLD AUTO: 26 %
MCH RBC QN AUTO: 29.5 PG (ref 26.5–33)
MCHC RBC AUTO-ENTMCNC: 32.1 G/DL (ref 31.5–36.5)
MCV RBC AUTO: 92 FL (ref 78–100)
MONOCYTES # BLD AUTO: 0.5 10E3/UL (ref 0–1.3)
MONOCYTES NFR BLD AUTO: 8 %
NEUTROPHILS # BLD AUTO: 4.1 10E3/UL (ref 1.6–8.3)
NEUTROPHILS NFR BLD AUTO: 64 %
NRBC # BLD AUTO: 0 10E3/UL
NRBC BLD AUTO-RTO: 0 /100
PLATELET # BLD AUTO: 254 10E3/UL (ref 150–450)
POTASSIUM SERPL-SCNC: 4.2 MMOL/L (ref 3.4–5.3)
PROT SERPL-MCNC: 6.9 G/DL (ref 6.4–8.3)
RBC # BLD AUTO: 4.58 10E6/UL (ref 4.4–5.9)
SODIUM SERPL-SCNC: 138 MMOL/L (ref 135–145)
SPECIMEN EXPIRATION DATE: NORMAL
WBC # BLD AUTO: 6.5 10E3/UL (ref 4–11)

## 2024-07-24 PROCEDURE — 250N000011 HC RX IP 250 OP 636: Performed by: EMERGENCY MEDICINE

## 2024-07-24 PROCEDURE — 86900 BLOOD TYPING SEROLOGIC ABO: CPT | Performed by: EMERGENCY MEDICINE

## 2024-07-24 PROCEDURE — 36415 COLL VENOUS BLD VENIPUNCTURE: CPT | Performed by: EMERGENCY MEDICINE

## 2024-07-24 PROCEDURE — 99285 EMERGENCY DEPT VISIT HI MDM: CPT | Mod: 25

## 2024-07-24 PROCEDURE — 82040 ASSAY OF SERUM ALBUMIN: CPT | Performed by: EMERGENCY MEDICINE

## 2024-07-24 PROCEDURE — 85025 COMPLETE CBC W/AUTO DIFF WBC: CPT | Performed by: EMERGENCY MEDICINE

## 2024-07-24 PROCEDURE — 74177 CT ABD & PELVIS W/CONTRAST: CPT

## 2024-07-24 RX ORDER — IOPAMIDOL 755 MG/ML
500 INJECTION, SOLUTION INTRAVASCULAR ONCE
Status: COMPLETED | OUTPATIENT
Start: 2024-07-24 | End: 2024-07-24

## 2024-07-24 RX ADMIN — IOPAMIDOL 79 ML: 755 INJECTION, SOLUTION INTRAVENOUS at 19:03

## 2024-07-24 ASSESSMENT — COLUMBIA-SUICIDE SEVERITY RATING SCALE - C-SSRS
2. HAVE YOU ACTUALLY HAD ANY THOUGHTS OF KILLING YOURSELF IN THE PAST MONTH?: NO
6. HAVE YOU EVER DONE ANYTHING, STARTED TO DO ANYTHING, OR PREPARED TO DO ANYTHING TO END YOUR LIFE?: NO
1. IN THE PAST MONTH, HAVE YOU WISHED YOU WERE DEAD OR WISHED YOU COULD GO TO SLEEP AND NOT WAKE UP?: NO

## 2024-07-24 ASSESSMENT — ACTIVITIES OF DAILY LIVING (ADL)
ADLS_ACUITY_SCORE: 35

## 2024-07-24 NOTE — ED TRIAGE NOTES
Patient states that he had diarrhea over night and noted blood in his stool. Patient states the diarrhea lasted about 45 mins but he has been fine since.    Triage Assessment (Adult)       Row Name 07/24/24 1808          Triage Assessment    Airway WDL WDL        Respiratory WDL    Respiratory WDL WDL        Skin Circulation/Temperature WDL    Skin Circulation/Temperature WDL WDL        Cardiac WDL    Cardiac WDL WDL        Peripheral/Neurovascular WDL    Peripheral Neurovascular WDL WDL        Cognitive/Neuro/Behavioral WDL    Cognitive/Neuro/Behavioral WDL WDL
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
## 2024-07-24 NOTE — TELEPHONE ENCOUNTER
S-(situation): Patient concerned with new onset rectal bleeding/blood stool    B-(background): patient ate out on Friday 7/19 and experienced some gastric upset including one episode of diarrhea.     Starting at 3am today patient had several episodes of diarrhea with large amounts of blood in toilet and possibly incorporated in to stool. Episodes were within a 45 min timeframe. Toilet water turns red. Since stool was loose patient unable to concretely determine if stool was bloody.     Denied hx of hemorrhoids. Hx of diverticulosis    A-(assessment): Patient does have some weakness today. He was eaten a small amount today and denied a bowel movement. Denied fever, dizziness, abdominal pain, nausea, vomiting and constant rectal bleeding without bowel movement.     R-(recommendations): Disposition to be seen in ED. Patient is agreeable and aware of closest location. He had no further questions for me. Encouraged to call back if needed.       Reason for Disposition   MODERATE rectal bleeding (small blood clots, passing blood without stool, or toilet water turns red) more than once a day    Additional Information   Negative: Passed out (i.e., fainted, collapsed and was not responding)   Negative: Shock suspected (e.g., cold/pale/clammy skin, too weak to stand, low BP, rapid pulse)   Negative: Vomiting red blood or black (coffee ground) material   Negative: Sounds like a life-threatening emergency to the triager   Negative: Diarrhea is main symptom   Negative: Rectal symptoms   Negative: SEVERE rectal bleeding (large blood clots; constant or on and off bleeding)   Negative: SEVERE dizziness (e.g., unable to stand, requires support to walk, feels like passing out now)    Protocols used: Rectal Bleeding-A-OH

## 2024-07-25 ENCOUNTER — PATIENT OUTREACH (OUTPATIENT)
Dept: NURSING | Facility: CLINIC | Age: 79
End: 2024-07-25
Payer: COMMERCIAL

## 2024-07-25 NOTE — TELEPHONE ENCOUNTER
Transitions of Care Outreach  Chief Complaint   Patient presents with    Hospital F/U       Most Recent Admission Date: 7/24/2024   Most Recent Admission Diagnosis:      Most Recent Discharge Date: 7/24/2024   Most Recent Discharge Diagnosis: Rectal bleeding - K62.5     Transitions of Care Assessment    Discharge Assessment  How are you doing now that you are home?: doing ok, no further bleeding, only happened because,  How are your symptoms? (Red Flag symptoms escalate to triage hotline per guidelines): Improved  Do you know how to contact your clinic care team if you have future questions or changes to your health status? : No  Does the patient have their discharge instructions? : Yes  Does the patient have questions regarding their discharge instructions? : No  Were you started on any new medications or were there changes to any of your previous medications? : No  Does the patient have all of their medications?: Yes  Do you have questions regarding any of your medications? : No  Do you have all of your needed medical supplies or equipment (DME)?  (i.e. oxygen tank, CPAP, cane, etc.): Yes    Follow up Plan     Discharge Follow-Up  Discharge follow up appointment scheduled in alignment with recommended follow up timeframe or Transitions of Risk Category? (Low = within 30 days; Moderate= within 14 days; High= within 7 days): No  Patient's follow up appointment not scheduled: Patient declined scheduling support. Education on the importance of transitions of care follow up. Provided scheduling phone number. (will be seeing GI, but they haven't called to schedule yet.)    No future appointments.    Outpatient Plan as outlined on AVS reviewed with patient.    For any urgent concerns, please contact our 24 hour nurse triage line: [phone] ([phone])       GLORIA RODRIGUEZ RN

## 2024-07-26 ENCOUNTER — TELEPHONE (OUTPATIENT)
Dept: GASTROENTEROLOGY | Facility: CLINIC | Age: 79
End: 2024-07-26

## 2024-07-26 NOTE — TELEPHONE ENCOUNTER
[facility] Call Center    Phone Message    May a detailed message be left on voicemail: Yes    Reason for Call: Other: Patient is currently scheduled on 09/26/2024, as visit type New GI Urgent. This is outside the expected timeline for this referral. Patient has been added to the waitlist.      Action Taken: Message routed to:  Other: GI REFERRAL TRIAGE POOL     Travel Screening: Not Applicable

## 2024-08-02 NOTE — TELEPHONE ENCOUNTER
Clinic Navigator sent a [employer] message to inform the Pt that Pt is on the wait list for a sooner appointment. Clinic Navigator will reach out to the Pt via phone call or [employer] when a sooner appointment becomes available.

## 2024-08-10 DIAGNOSIS — R35.0 BENIGN PROSTATIC HYPERPLASIA WITH URINARY FREQUENCY: ICD-10-CM

## 2024-08-10 DIAGNOSIS — N40.1 BENIGN PROSTATIC HYPERPLASIA WITH URINARY FREQUENCY: ICD-10-CM

## 2024-08-12 RX ORDER — TAMSULOSIN HYDROCHLORIDE 0.4 MG/1
0.4 CAPSULE ORAL EVERY EVENING
Qty: 90 CAPSULE | Refills: 3 | OUTPATIENT
Start: 2024-08-12

## 2024-08-13 ENCOUNTER — TRANSFERRED RECORDS (OUTPATIENT)
Dept: HEALTH INFORMATION MANAGEMENT | Facility: CLINIC | Age: 79
End: 2024-08-13
Payer: COMMERCIAL

## 2024-08-13 LAB — RETINOPATHY: NEGATIVE

## 2024-08-27 DIAGNOSIS — E11.42 TYPE 2 DIABETES MELLITUS WITH DIABETIC POLYNEUROPATHY, WITHOUT LONG-TERM CURRENT USE OF INSULIN (H): ICD-10-CM

## 2024-08-27 RX ORDER — EXENATIDE 2 MG/.85ML
2 INJECTION, SUSPENSION, EXTENDED RELEASE SUBCUTANEOUS
Qty: 3.4 ML | Refills: 2 | Status: SHIPPED | OUTPATIENT
Start: 2024-08-27

## 2024-09-02 DIAGNOSIS — E78.5 HYPERLIPIDEMIA LDL GOAL <70: ICD-10-CM

## 2024-09-03 RX ORDER — SIMVASTATIN 20 MG
20 TABLET ORAL AT BEDTIME
Qty: 90 TABLET | Refills: 2 | Status: SHIPPED | OUTPATIENT
Start: 2024-09-03

## 2024-09-06 DIAGNOSIS — E11.42 TYPE 2 DIABETES MELLITUS WITH DIABETIC POLYNEUROPATHY, WITHOUT LONG-TERM CURRENT USE OF INSULIN (H): ICD-10-CM

## 2024-09-09 RX ORDER — METFORMIN HCL 500 MG
TABLET, EXTENDED RELEASE 24 HR ORAL
Qty: 180 TABLET | Refills: 0 | Status: SHIPPED | OUTPATIENT
Start: 2024-09-09

## 2024-10-19 ENCOUNTER — HEALTH MAINTENANCE LETTER (OUTPATIENT)
Age: 79
End: 2024-10-19

## 2024-10-24 DIAGNOSIS — E11.9 TYPE 2 DIABETES MELLITUS WITHOUT COMPLICATION, WITHOUT LONG-TERM CURRENT USE OF INSULIN (H): ICD-10-CM

## 2024-10-25 NOTE — TELEPHONE ENCOUNTER
Chart reviewed.  Rx sent to pt's preferred pharmacy.       [facility]  [id number] [place] [facility] [place] [id number] [address]    Tj Verma MD

## 2024-11-14 DIAGNOSIS — E11.42 TYPE 2 DIABETES MELLITUS WITH DIABETIC POLYNEUROPATHY, WITHOUT LONG-TERM CURRENT USE OF INSULIN (H): ICD-10-CM

## 2024-11-14 DIAGNOSIS — I10 ESSENTIAL HYPERTENSION: ICD-10-CM

## 2024-11-14 RX ORDER — LOSARTAN POTASSIUM 100 MG/1
TABLET ORAL
Qty: 90 TABLET | Refills: 1 | Status: SHIPPED | OUTPATIENT
Start: 2024-11-14

## 2024-11-15 RX ORDER — EXENATIDE 2 MG/.85ML
2 INJECTION, SUSPENSION, EXTENDED RELEASE SUBCUTANEOUS
Qty: 3.4 ML | Refills: 1 | Status: SHIPPED | OUTPATIENT
Start: 2024-11-15

## 2024-11-20 ENCOUNTER — PATIENT OUTREACH (OUTPATIENT)
Dept: CARE COORDINATION | Facility: CLINIC | Age: 79
End: 2024-11-20
Payer: COMMERCIAL

## 2024-12-04 ENCOUNTER — PATIENT OUTREACH (OUTPATIENT)
Dept: CARE COORDINATION | Facility: CLINIC | Age: 79
End: 2024-12-04
Payer: COMMERCIAL

## 2024-12-13 DIAGNOSIS — E11.42 TYPE 2 DIABETES MELLITUS WITH DIABETIC POLYNEUROPATHY, WITHOUT LONG-TERM CURRENT USE OF INSULIN (H): ICD-10-CM

## 2024-12-13 DIAGNOSIS — I70.212 ATHEROSCLEROSIS OF NATIVE ARTERIES OF EXTREMITIES WITH INTERMITTENT CLAUDICATION, LEFT LEG (H): ICD-10-CM

## 2024-12-13 DIAGNOSIS — I73.9 LEFT LEG CLAUDICATION (H): ICD-10-CM

## 2024-12-14 ENCOUNTER — MYC REFILL (OUTPATIENT)
Dept: FAMILY MEDICINE | Facility: CLINIC | Age: 79
End: 2024-12-14
Payer: COMMERCIAL

## 2024-12-14 DIAGNOSIS — E11.42 TYPE 2 DIABETES MELLITUS WITH DIABETIC POLYNEUROPATHY, WITHOUT LONG-TERM CURRENT USE OF INSULIN (H): ICD-10-CM

## 2024-12-16 RX ORDER — CLOPIDOGREL BISULFATE 75 MG/1
75 TABLET ORAL DAILY
Qty: 90 TABLET | Refills: 3 | OUTPATIENT
Start: 2024-12-16

## 2024-12-16 RX ORDER — METFORMIN HYDROCHLORIDE 500 MG/1
1000 TABLET, EXTENDED RELEASE ORAL
Qty: 180 TABLET | Refills: 0 | Status: SHIPPED | OUTPATIENT
Start: 2024-12-16

## 2024-12-16 RX ORDER — DAPAGLIFLOZIN 10 MG/1
10 TABLET, FILM COATED ORAL DAILY
Qty: 90 TABLET | Refills: 0 | Status: SHIPPED | OUTPATIENT
Start: 2024-12-16

## 2024-12-28 DIAGNOSIS — N40.1 BENIGN PROSTATIC HYPERPLASIA WITH URINARY FREQUENCY: ICD-10-CM

## 2024-12-28 DIAGNOSIS — R35.0 BENIGN PROSTATIC HYPERPLASIA WITH URINARY FREQUENCY: ICD-10-CM

## 2024-12-30 RX ORDER — TAMSULOSIN HYDROCHLORIDE 0.4 MG/1
0.4 CAPSULE ORAL EVERY EVENING
Qty: 90 CAPSULE | Refills: 3 | OUTPATIENT
Start: 2024-12-30

## 2024-12-30 NOTE — TELEPHONE ENCOUNTER
Called and spoke with patient.     has 4 jars of it, haven't used it in a long time.     No refill needed.    GLORIA RODRIGUEZ RN on 12/30/2024 at 2:46 PM   [facility]

## 2024-12-30 NOTE — TELEPHONE ENCOUNTER
Clinic RN: Please investigate patient's chart or contact patient if the information cannot be found because the medication is listed as historical or discontinued. Confirm patient is taking this medication. Document findings and route refill encounter to provider for approval or denial.    Myra MCKEON, RN, PHN

## 2025-01-15 ENCOUNTER — OFFICE VISIT (OUTPATIENT)
Dept: FAMILY MEDICINE | Facility: CLINIC | Age: 80
End: 2025-01-15
Payer: COMMERCIAL

## 2025-01-15 VITALS
SYSTOLIC BLOOD PRESSURE: 138 MMHG | OXYGEN SATURATION: 97 % | BODY MASS INDEX: 22.82 KG/M2 | TEMPERATURE: 98.1 F | HEIGHT: 71 IN | DIASTOLIC BLOOD PRESSURE: 84 MMHG | RESPIRATION RATE: 16 BRPM | WEIGHT: 163 LBS | HEART RATE: 94 BPM

## 2025-01-15 DIAGNOSIS — Z00.00 ENCOUNTER FOR MEDICARE ANNUAL WELLNESS EXAM: ICD-10-CM

## 2025-01-15 DIAGNOSIS — I70.212 ATHEROSCLEROSIS OF NATIVE ARTERIES OF EXTREMITIES WITH INTERMITTENT CLAUDICATION, LEFT LEG: ICD-10-CM

## 2025-01-15 DIAGNOSIS — E11.42 TYPE 2 DIABETES MELLITUS WITH DIABETIC POLYNEUROPATHY, WITHOUT LONG-TERM CURRENT USE OF INSULIN (H): Primary | ICD-10-CM

## 2025-01-15 DIAGNOSIS — I35.2 NONRHEUMATIC AORTIC INSUFFICIENCY WITH AORTIC STENOSIS: ICD-10-CM

## 2025-01-15 DIAGNOSIS — Z23 NEED FOR COVID-19 VACCINE: ICD-10-CM

## 2025-01-15 DIAGNOSIS — I73.9 LEFT LEG CLAUDICATION: ICD-10-CM

## 2025-01-15 PROBLEM — J43.9 PULMONARY EMPHYSEMA, UNSPECIFIED EMPHYSEMA TYPE (H): Status: RESOLVED | Noted: 2020-10-30 | Resolved: 2025-01-15

## 2025-01-15 LAB
EST. AVERAGE GLUCOSE BLD GHB EST-MCNC: 180 MG/DL
HBA1C MFR BLD: 7.9 % (ref 0–5.6)

## 2025-01-15 PROCEDURE — 91320 SARSCV2 VAC 30MCG TRS-SUC IM: CPT | Performed by: FAMILY MEDICINE

## 2025-01-15 PROCEDURE — 83036 HEMOGLOBIN GLYCOSYLATED A1C: CPT | Performed by: FAMILY MEDICINE

## 2025-01-15 PROCEDURE — G2211 COMPLEX E/M VISIT ADD ON: HCPCS | Performed by: FAMILY MEDICINE

## 2025-01-15 PROCEDURE — 90480 ADMN SARSCOV2 VAC 1/ONLY CMP: CPT | Performed by: FAMILY MEDICINE

## 2025-01-15 PROCEDURE — G0439 PPPS, SUBSEQ VISIT: HCPCS | Performed by: FAMILY MEDICINE

## 2025-01-15 PROCEDURE — 99214 OFFICE O/P EST MOD 30 MIN: CPT | Mod: 25 | Performed by: FAMILY MEDICINE

## 2025-01-15 PROCEDURE — 36415 COLL VENOUS BLD VENIPUNCTURE: CPT | Performed by: FAMILY MEDICINE

## 2025-01-15 RX ORDER — DAPAGLIFLOZIN 10 MG/1
10 TABLET, FILM COATED ORAL DAILY
Qty: 90 TABLET | Refills: 1 | Status: SHIPPED | OUTPATIENT
Start: 2025-01-15

## 2025-01-15 RX ORDER — CLOPIDOGREL BISULFATE 75 MG/1
75 TABLET ORAL DAILY
Qty: 90 TABLET | Refills: 3 | Status: SHIPPED | OUTPATIENT
Start: 2025-01-15

## 2025-01-15 SDOH — HEALTH STABILITY: PHYSICAL HEALTH: ON AVERAGE, HOW MANY DAYS PER WEEK DO YOU ENGAGE IN MODERATE TO STRENUOUS EXERCISE (LIKE A BRISK WALK)?: 2 DAYS

## 2025-01-15 ASSESSMENT — SOCIAL DETERMINANTS OF HEALTH (SDOH): HOW OFTEN DO YOU GET TOGETHER WITH FRIENDS OR RELATIVES?: THREE TIMES A WEEK

## 2025-01-15 NOTE — PATIENT INSTRUCTIONS
Patient Education     [facility]    Preventive Care Advice   This is general advice given by our system to help you stay healthy. However, your care team may have specific advice just for you. Please talk to your care team about your preventive care needs.  Nutrition  Eat 5 or more servings of fruits and vegetables each day.  Try wheat bread, brown rice and whole grain pasta (instead of white bread, rice, and pasta).  Get enough calcium and vitamin D. Check the label on foods and aim for 100% of the RDA (recommended daily allowance).  Lifestyle  Exercise at least 150 minutes each week  (30 minutes a day, 5 days a week).  Do muscle strengthening activities 2 days a week. These help control your weight and prevent disease.  No smoking.  Wear sunscreen to prevent skin cancer.  Have a dental exam and cleaning every 6 months.  Yearly exams  See your health care team every year to talk about:  Any changes in your health.  Any medicines your care team has prescribed.  Preventive care, family planning, and ways to prevent chronic diseases.  Shots (vaccines)   HPV shots (up to age 26), if you've never had them before.  Hepatitis B shots (up to age 59), if you've never had them before.  COVID-19 shot: Get this shot when it's due.  Flu shot: Get a flu shot every year.  Tetanus shot: Get a tetanus shot every 10 years.  Pneumococcal, hepatitis A, and RSV shots: Ask your care team if you need these based on your risk.  Shingles shot (for age 50 and up)  General health tests  Diabetes screening:  Starting at age 35, Get screened for diabetes at least every 3 years.  If you are younger than age 35, ask your care team if you should be screened for diabetes.  Cholesterol test: At age 39, start having a cholesterol test every 5 years, or more often if advised.  Bone density scan (DEXA): At age 50, ask your care team if you should have this scan for osteoporosis (brittle bones).  Hepatitis C: Get tested at least once in  your life.  STIs (sexually transmitted infections)  Before age 24: Ask your care team if you should be screened for STIs.  After age 24: Get screened for STIs if you're at risk. You are at risk for STIs (including HIV) if:  You are sexually active with more than one person.  You don't use condoms every time.  You or a partner was diagnosed with a sexually transmitted infection.  If you are at risk for HIV, ask about PrEP medicine to prevent HIV.  Get tested for HIV at least once in your life, whether you are at risk for HIV or not.  Cancer screening tests  Cervical cancer screening: If you have a cervix, begin getting regular cervical cancer screening tests starting at age 21.  Breast cancer scan (mammogram): If you've ever had breasts, begin having regular mammograms starting at age 40. This is a scan to check for breast cancer.  Colon cancer screening: It is important to start screening for colon cancer at age 45.  Have a colonoscopy test every 10 years (or more often if you're at risk) Or, ask your provider about stool tests like a FIT test every year or Cologuard test every 3 years.  To learn more about your testing options, visit:   .  For help making a decision, visit:   https://bit.ly/lj57741.  Prostate cancer screening test: If you have a prostate, ask your care team if a prostate cancer screening test (PSA) at age 55 is right for you.  Lung cancer screening: If you are a current or former smoker ages 50 to 80, ask your care team if ongoing lung cancer screenings are right for you.  For informational purposes only. Not to replace the advice of your health care provider. Copyright   2023 [employer]. All rights reserved. Clinically reviewed by the [facility] Transitions Program. [employer] [id number] - REV 01/24.

## 2025-01-15 NOTE — PROGRESS NOTES
Preventive Care Visit  [facility]  Tj Verma Jr, MD, Family Medicine  Nathaniel 15, 2025      Assessment & Plan     Type 2 diabetes mellitus with diabetic polyneuropathy, without long-term current use of insulin (H)  A1c returned at 7.9 today.  We will switch him from his Bydureon/exenatide to semaglutide, beginning at the lowest dose of 0.25 mg/week for the next month.  I anticipate this will get increased to 0.5 mg weekly after that.  He otherwise continues on maximum dose of metformin and 10 mg of the Farxiga.  Recheck with me in 6 months, though he will send me a DN[employer]K message in about a 1 month to advise me on how the 0.25 mg/week dose of semaglutide is helping his sugars.  - HEMOGLOBIN A1C; Future  - dapagliflozin (FARXIGA) 10 MG TABS tablet; Take 1 tablet (10 mg) by mouth daily.  - HEMOGLOBIN A1C    Encounter for Medicare annual wellness exam  He is up-to-date on preventative healthcare needs.  He is fairly certain he had a influenza vaccine earlier this year when he got his COVID booster back in the fall 2024.  We are therefore holding on a flu shot today.  I have encouraged him to follow-up with his pharmacy to confirm he indeed got the influenza vaccine as we do not have a record of this.    Left leg claudication  He is a little concerned about ecchymosis on the dorsum of his right great toe which occurred without obvious preceding trauma.  He has had some other bruising on his ulnar styloid late last year that occurred with very little trauma and he wonders if he should stop his clopidogrel.  Despite these small episodes of ecchymotic change, I would like him to continue with his anticoagulation in the form of dual antiplatelet therapy of clopidogrel and aspirin.  - clopidogrel (PLAVIX) 75 MG tablet; Take 1 tablet (75 mg) by mouth daily. Start taking medication the day after the procedure    Atherosclerosis of native arteries of extremities with intermittent claudication, left  leg  As above.  - clopidogrel (PLAVIX) 75 MG tablet; Take 1 tablet (75 mg) by mouth daily. Start taking medication the day after the procedure    Nonrheumatic aortic insufficiency with aortic stenosis  Review of his chart demonstrates and initial diagnosis of aortic insufficiency back in 2019.  Echocardiogram was appropriately followed up 3 years later demonstrating no further degradation in the aortic insufficiency nor stenosis.  Will continue to follow this clinically.    Need for COVID-19 vaccine  CDC recommended second booster in adults above 65 is given today.  - COVID-19 12+ (PFIZER)    The longitudinal plan of care for the diagnosis(es)/condition(s) as documented were addressed during this visit. Due to the added complexity in care, I will continue to support Nik in the subsequent management and with ongoing continuity of care.            Counseling  Appropriate preventive services were addressed with this patient via screening, questionnaire, or discussion as appropriate for fall prevention, nutrition, physical activity, Tobacco-use cessation, social engagement, weight loss and cognition.  Checklist reviewing preventive services available has been given to the patient.  Reviewed patient's diet, addressing concerns and/or questions.   He is at risk for lack of exercise and has been provided with information to increase physical activity for the benefit of his well-being.           Subjective   Nik is a 79 year old, presenting for the following:  Physical        1/15/2025     3:09 PM   Additional Questions   Roomed by Shasha HAWKINS CMA     HPI    One episode of bright red blood per rectum.  This happened during an episode of diarrhea that he had back in August 2024.  He called Dr. Bravo who advised him he needs a referral to have a colonoscopy.  Has not had any diarrheal episodes since then and no bleeding. No abdominal pain, night sweats, weight loss.        Diabetes Follow-up    How often are you checking your  blood sugar? Continuous glucose monitor  What time of day are you checking your blood sugars (select all that apply)?  Not applicable  Have you had any blood sugars above 200?  Yes occasionally  Have you had any blood sugars below 70?  Yes in the last 3 months has been two times   What symptoms do you notice when your blood sugar is low?  None  What concerns do you have today about your diabetes? None   Do you have any of these symptoms? (Select all that apply)  Numbness in feet and Burning in feet  Reports that his exenatide is no longer covered and that he needs to switch to a different GLP-1 agonist.  He already has a prescription for semaglutide which she has yet to start.          Hyperlipidemia Follow-Up    Are you regularly taking any medication or supplement to lower your cholesterol?   Yes- simvastatin 20 MG  Are you having muscle aches or other side effects that you think could be caused by your cholesterol lowering medication?  No    Hypertension Follow-up    Do you check your blood pressure regularly outside of the clinic? Yes   Are you following a low salt diet? Yes  Are your blood pressures ever more than 140 on the top number (systolic) OR more   than 90 on the bottom number (diastolic), for example 140/90? No    BP Readings from Last 2 Encounters:   01/15/25 138/84   07/24/24 116/78     Hemoglobin A1C (%)   Date Value   06/27/2024 7.3 (H)   12/20/2023 7.1 (H)   05/26/2021 7.8 (H)   03/15/2021 8.5 (H)     LDL Cholesterol Calculated (mg/dL)   Date Value   06/27/2024 68   08/25/2023 65   05/26/2021 48   06/12/2020 76         Health Care Directive  Patient does not have a Health Care Directive: Discussed advance care planning with patient; however, patient declined at this time.      1/15/2025   General Health   How would you rate your overall physical health? Good   Feel stress (tense, anxious, or unable to sleep) Not at all         1/15/2025   Nutrition   Diet: Diabetic         1/15/2025   Exercise    Days per week of moderate/strenous exercise 2 days   (!) EXERCISE CONCERN      1/15/2025   Social Factors   Frequency of gathering with friends or relatives Three times a week   Worry food won't last until get money to buy more No   Food not last or not have enough money for food? No   Do you have housing? (Housing is defined as stable permanent housing and does not include staying ouside in a car, in a tent, in an abandoned building, in an overnight shelter, or couch-surfing.) Yes   Are you worried about losing your housing? No   Lack of transportation? No   Unable to get utilities (heat,electricity)? No         1/15/2025   Fall Risk   Fallen 2 or more times in the past year? No   Trouble with walking or balance? No          1/15/2025   Activities of Daily Living- Home Safety   Needs help with the following daily activites None of the above   Safety concerns in the home None of the above         1/15/2025   Dental   Dentist two times every year? Yes         1/15/2025   Hearing Screening   Hearing concerns? None of the above         1/15/2025   Driving Risk Screening   Patient/family members have concerns about driving No         1/15/2025   General Alertness/Fatigue Screening   Have you been more tired than usual lately? No         1/15/2025   Urinary Incontinence Screening   Bothered by leaking urine in past 6 months No         1/15/2025   TB Screening   Were you born outside of the [place]? No         Today's PHQ-2 Score:       1/15/2025     3:07 PM   PHQ-2 ( 1999 Pfizer)   Q1: Little interest or pleasure in doing things 0   Q2: Feeling down, depressed or hopeless 0   PHQ-2 Score 0    Q1: Little interest or pleasure in doing things Not at all   Q2: Feeling down, depressed or hopeless Not at all   PHQ-2 Score 0       Patient-reported           1/15/2025   Substance Use   Alcohol more than 3/day or more than 7/wk No   Do you have a current opioid prescription? No   How severe/bad is pain from 1 to 10? 3/10   Do you  use any other substances recreationally? No     Social History     Tobacco Use    Smoking status: Former     Current packs/day: 0.00     Average packs/day: 0.5 packs/day for 50.0 years (25.0 ttl pk-yrs)     Types: Cigarettes     Start date: 1962     Quit date: 2012     Years since quittin.0    Smokeless tobacco: Never   Vaping Use    Vaping status: Never Used   Substance Use Topics    Alcohol use: Yes     Comment: 3 per week    Drug use: No       ASCVD Risk   The 10-year ASCVD risk score (Mary VARGAS, et al., 2019) is: 57.6%    Values used to calculate the score:      Age: 79 years      Sex: Male      Is Non- : No      Diabetic: Yes      Tobacco smoker: No      Systolic Blood Pressure: 138 mmHg      Is BP treated: Yes      HDL Cholesterol: 43 mg/dL      Total Cholesterol: 137 mg/dL            Reviewed and updated as needed this visit by Provider                      Current providers sharing in care for this patient include:  Patient Care Team:  Tj Verma Jr., MD as PCP - General (Family Practice)  Stephie Ruiz, RN as Diabetes Educator (Diabetes Education)  Nayana Jimenez RN as Diabetes Educator (Diabetes Education)  Tj Verma Jr., MD as Assigned PCP  Ramón Orellana MD as Assigned Heart and Vascular Provider  Tash Hammer APRN CNP as Nurse Practitioner (Gastroenterology)    The following health maintenance items are reviewed in Epic and correct as of today:  Health Maintenance   Topic Date Due    INFLUENZA VACCINE (1) 2024    A1C  2024    COVID-19 Vaccine (10 - 2024-25 season) 2024    DIABETIC FOOT EXAM  2024    MEDICARE ANNUAL WELLNESS VISIT  2024    ANNUAL REVIEW OF HM ORDERS  2025    LIPID  2025    MICROALBUMIN  2025    ECHO COMPLETE  07/15/2025    BMP  2025    EYE EXAM  2025    FALL RISK ASSESSMENT  01/15/2026    ADVANCE CARE PLANNING  2028    DTAP/TDAP/TD  "IMMUNIZATION (3 - Td or Tdap) 05/12/2033    SPIROMETRY  Completed    HEPATITIS C SCREENING  Completed    COPD ACTION PLAN  Completed    PHQ-2 (once per calendar year)  Completed    Pneumococcal Vaccine: 50+ Years  Completed    ZOSTER IMMUNIZATION  Completed    RSV VACCINE  Completed    HPV IMMUNIZATION  Aged Out    MENINGITIS IMMUNIZATION  Aged Out    RSV MONOCLONAL ANTIBODY  Aged Out    TSH W/FREE T4 REFLEX  Discontinued    COLORECTAL CANCER SCREENING  Discontinued    LUNG CANCER SCREENING  Discontinued            Objective    Exam  /84   Pulse 94   Temp 98.1  F (36.7  C) (Tympanic)   Resp 16   Ht 1.803 m (5' 11\")   Wt 73.9 kg (163 lb)   SpO2 97%   BMI 22.73 kg/m     Estimated body mass index is 22.73 kg/m  as calculated from the following:    Height as of this encounter: 1.803 m (5' 11\").    Weight as of this encounter: 73.9 kg (163 lb).    Physical Exam  GENERAL: alert and no distress  EYES: Eyes grossly normal to inspection, PERRL and conjunctivae and sclerae normal  HENT: ear canals and TM's normal, nose and mouth without ulcers or lesions  NECK: no adenopathy, no asymmetry, masses, or scars  RESP: lungs clear to auscultation - no rales, rhonchi or wheezes  CV: regular rate and rhythm, normal S1 S2, no S3 or S4, no murmur, click or rub, no peripheral edema  ABDOMEN: soft, nontender, no hepatosplenomegaly, no masses and bowel sounds normal  MS: no gross musculoskeletal defects noted, no edema  SKIN: no suspicious lesions or rashes  NEURO: Normal strength and tone, mentation intact and speech normal  PSYCH: mentation appears normal, affect normal/bright  Diabetic foot exam: normal DP and PT pulses, no trophic changes or ulcerative lesions, and normal monofilament exam, except for findings noted on diabetic foot graphical image.  Numbers 1, 2, 4, 5, 6, 7, 8, 9 bilaterally.                 1/15/2025   Mini Cog   Clock Draw Score 0 Abnormal   3 Item Recall 1 object recalled   Mini Cog Total Score 1 "             1/15/2025   Vision Screen   Vision Screen Results Pass   No Corrective Lenses, PLUS LENS REQUIRED Pass       Signed Electronically by: Tj Verma Jr, MD

## 2025-01-16 NOTE — RESULT ENCOUNTER NOTE
Mr. De La Vega,    -A1C (test of diabetes control the last 2-3 months) is at your goal. Please continue with your current plan. Also, you should make an appointment to see me and recheck your A1C test in 6 months. Remember to send me a [employer] message in about a month after the transition to Ozempic to let me know how that's going.  I suspect we'll be increasing your dose.    If you have further questions about the interpretation of your labs, [email or web address] is a good website to check out for further information.    Please contact the clinic if you have additional questions.  Thank you.    Sincerely,    Tj Verma MD

## 2025-01-27 ENCOUNTER — MYC REFILL (OUTPATIENT)
Dept: FAMILY MEDICINE | Facility: CLINIC | Age: 80
End: 2025-01-27
Payer: COMMERCIAL

## 2025-01-27 DIAGNOSIS — I73.9 LEFT LEG CLAUDICATION: ICD-10-CM

## 2025-01-27 DIAGNOSIS — I70.212 ATHEROSCLEROSIS OF NATIVE ARTERIES OF EXTREMITIES WITH INTERMITTENT CLAUDICATION, LEFT LEG: ICD-10-CM

## 2025-01-27 RX ORDER — CLOPIDOGREL BISULFATE 75 MG/1
75 TABLET ORAL DAILY
Qty: 90 TABLET | Refills: 3 | Status: SHIPPED | OUTPATIENT
Start: 2025-01-27

## 2025-01-28 ENCOUNTER — MYC REFILL (OUTPATIENT)
Dept: FAMILY MEDICINE | Facility: CLINIC | Age: 80
End: 2025-01-28
Payer: COMMERCIAL

## 2025-01-28 DIAGNOSIS — E11.42 TYPE 2 DIABETES MELLITUS WITH DIABETIC POLYNEUROPATHY, WITHOUT LONG-TERM CURRENT USE OF INSULIN (H): ICD-10-CM

## 2025-01-28 RX ORDER — GABAPENTIN 300 MG/1
300 CAPSULE ORAL 3 TIMES DAILY
Qty: 270 CAPSULE | Refills: 3 | Status: SHIPPED | OUTPATIENT
Start: 2025-01-28

## 2025-01-28 RX ORDER — METFORMIN HYDROCHLORIDE 500 MG/1
1000 TABLET, EXTENDED RELEASE ORAL
Qty: 180 TABLET | Refills: 1 | Status: SHIPPED | OUTPATIENT
Start: 2025-01-28

## 2025-03-08 ENCOUNTER — MYC REFILL (OUTPATIENT)
Dept: FAMILY MEDICINE | Facility: CLINIC | Age: 80
End: 2025-03-08
Payer: COMMERCIAL

## 2025-03-08 DIAGNOSIS — E78.5 HYPERLIPIDEMIA LDL GOAL <70: ICD-10-CM

## 2025-03-08 DIAGNOSIS — I10 ESSENTIAL HYPERTENSION: ICD-10-CM

## 2025-03-08 DIAGNOSIS — E11.42 TYPE 2 DIABETES MELLITUS WITH DIABETIC POLYNEUROPATHY, WITHOUT LONG-TERM CURRENT USE OF INSULIN (H): ICD-10-CM

## 2025-03-10 RX ORDER — LOSARTAN POTASSIUM 100 MG/1
100 TABLET ORAL DAILY
Qty: 90 TABLET | Refills: 0 | Status: SHIPPED | OUTPATIENT
Start: 2025-03-10

## 2025-03-10 RX ORDER — DAPAGLIFLOZIN 10 MG/1
10 TABLET, FILM COATED ORAL DAILY
Qty: 90 TABLET | Refills: 0 | Status: SHIPPED | OUTPATIENT
Start: 2025-03-10

## 2025-03-10 RX ORDER — SIMVASTATIN 20 MG
20 TABLET ORAL AT BEDTIME
Qty: 90 TABLET | Refills: 1 | Status: SHIPPED | OUTPATIENT
Start: 2025-03-10

## 2025-03-13 ENCOUNTER — OFFICE VISIT (OUTPATIENT)
Dept: FAMILY MEDICINE | Facility: CLINIC | Age: 80
End: 2025-03-13
Payer: COMMERCIAL

## 2025-03-13 ENCOUNTER — ANCILLARY PROCEDURE (OUTPATIENT)
Dept: GENERAL RADIOLOGY | Facility: CLINIC | Age: 80
End: 2025-03-13
Payer: COMMERCIAL

## 2025-03-13 VITALS
RESPIRATION RATE: 16 BRPM | DIASTOLIC BLOOD PRESSURE: 78 MMHG | HEART RATE: 98 BPM | TEMPERATURE: 96.2 F | BODY MASS INDEX: 22.82 KG/M2 | OXYGEN SATURATION: 95 % | HEIGHT: 71 IN | SYSTOLIC BLOOD PRESSURE: 130 MMHG | WEIGHT: 163 LBS

## 2025-03-13 DIAGNOSIS — Z79.899 MEDICATION MANAGEMENT: Primary | ICD-10-CM

## 2025-03-13 DIAGNOSIS — M79.621 PAIN OF RIGHT UPPER ARM: ICD-10-CM

## 2025-03-13 DIAGNOSIS — M79.621 PAIN OF RIGHT UPPER ARM: Primary | ICD-10-CM

## 2025-03-13 DIAGNOSIS — E11.42 TYPE 2 DIABETES MELLITUS WITH DIABETIC POLYNEUROPATHY, WITHOUT LONG-TERM CURRENT USE OF INSULIN (H): ICD-10-CM

## 2025-03-13 PROCEDURE — 99214 OFFICE O/P EST MOD 30 MIN: CPT

## 2025-03-13 PROCEDURE — 3075F SYST BP GE 130 - 139MM HG: CPT

## 2025-03-13 PROCEDURE — G2211 COMPLEX E/M VISIT ADD ON: HCPCS

## 2025-03-13 PROCEDURE — 3078F DIAST BP <80 MM HG: CPT

## 2025-03-13 NOTE — PROGRESS NOTES
"  Assessment & Plan     Medication management  ***    Pain of right upper arm  ***  - Physical Therapy  Referral  - XR Humerus Right G/E 2 Views  - Orthopedic  Referral        Subjective   Nik is a 79 year old, presenting for the following health issues:  Musculoskeletal Problem        3/13/2025    10:24 AM   Additional Questions   Roomed by Mary MCLEAN   Accompanied by self     History of Present Illness       Reason for visit:  Arm paln  Symptom onset:  More than a month  Symptom intensity:  Severe  Symptom progression:  Staying the same  Had these symptoms before:  No  What makes it worse:  Arm pain   He is taking medications regularly.      Jaylen is a 79 year old male who presents today with bilateral upper arm pains.         When reaching up and to the back is worse- same thing is  happening In the left arm    Pain in the right arm mostly when lifting above his head.       Has had a few falls recently. No injuries that he knows of. He has some brusing of his hands, but no burisng of his upper arm. Patinet is on blood thinners.     Pt takes tylenol arthritis 2 tabelts in the morning. Otherwise during the day if he needs it.     Pain is there only when lifting arms. He is on blood thinners.   Can supinate or pronate arms. Cannot reach around to pull seatbelt without pain.     Used to lift weights- cannot do any weight lifting now . 9-11/10 in certain positions. Does 3-5 miles of walking per day.            Review of Systems  Constitutional, neuro, ENT, endocrine, pulmonary, cardiac, gastrointestinal, genitourinary, musculoskeletal, integument and psychiatric systems are negative, except as otherwise noted.      Objective    /78   Pulse 98   Temp (!) 96.2  F (35.7  C) (Tympanic)   Resp 16   Ht 1.803 m (5' 11\")   Wt 73.9 kg (163 lb)   SpO2 95%   BMI 22.73 kg/m    Body mass index is 22.73 kg/m .  Physical Exam   {Exam List (Optional):915418}    {Diagnostic Test Results " (Optional):395915}      Signed Electronically by: Mindi Vera PA-C  {Email feedback regarding this note to [email or web address]   :[id number]}   "(!) 96.2  F (35.7  C) (Tympanic)   Resp 16   Ht 1.803 m (5' 11\")   Wt 73.9 kg (163 lb)   SpO2 95%   BMI 22.73 kg/m    Body mass index is 22.73 kg/m .  Physical Exam  Constitutional:       General: He is not in acute distress.     Appearance: Normal appearance. He is not ill-appearing or toxic-appearing.   HENT:      Head: Normocephalic.      Nose: Nose normal.      Mouth/Throat:      Mouth: Mucous membranes are moist.      Pharynx: Oropharynx is clear.   Eyes:      General: No scleral icterus.     Extraocular Movements: Extraocular movements intact.      Conjunctiva/sclera: Conjunctivae normal.   Cardiovascular:      Rate and Rhythm: Normal rate and regular rhythm.      Pulses: Normal pulses.      Heart sounds: Normal heart sounds.   Pulmonary:      Effort: Pulmonary effort is normal.      Breath sounds: Normal breath sounds.   Musculoskeletal:         General: Tenderness present. No swelling, deformity or signs of injury.        Arms:       Cervical back: Normal range of motion and neck supple.      Right lower leg: No edema.      Left lower leg: No edema.      Comments: Tenderness to palpation at above-marked areas. No swelling. Skin is intact. Arm is neurovascularly intact. No numbness or tingling. Pain with pronation and supination against resistance. Negative Tinel's and Finkelstein tests.     Skin:     General: Skin is warm and dry.   Neurological:      General: No focal deficit present.      Mental Status: He is alert.   Psychiatric:         Mood and Affect: Mood normal.         Behavior: Behavior normal.         Thought Content: Thought content normal.         Judgment: Judgment normal.        Xray - Reviewed and interpreted by me show no fractures.       Signed Electronically by: Mindi Vera PA-C    "

## 2025-03-17 NOTE — PROGRESS NOTES
ASSESSMENT & PLAN    Nik was seen today for musculoskeletal problem.    Diagnoses and all orders for this visit:    Rotator cuff tendonitis, right  -     Orthopedic  Referral  -     Large Joint Injection/Arthocentesis: R subacromial bursa      This issue is sub acute and Worsening. Nik presents our clinic today to discuss his subacute right upper extremity pain.  He reports pain in the proximal humerus that occurred insidiously about 4 months ago.  He was doing some light weight lifting at that time, and thinks this may have exacerbated his pain.  We reviewed his previous radiographs of the humerus that did not show any significant degenerative changes of the glenohumeral joint and was unremarkable for any acute bony abnormalities.  On examination, he has significant pain with active and passive range of motion in flexion and abduction, however can fully passively flex and abduct the shoulder.  He also has pain with rotator cuff testing, these findings are most consistent with rotator cuff tendinitis is the main drivers of his symptoms.  Given he does have full passive range of motion adhesive capsulitis is less likely.  We discussed trialing physical therapy and a corticosteroid injection to the subacromial bursa, the patient was amenable to proceeding with this.  We determined the following plan:  - CSI to the right subacromial bursa performed today under ultrasound guidance, see procedure note below for details  - Patient already has physical therapy scheduled and will begin physical therapy in the next 1 to 2 weeks  - Can otherwise utilize over-the-counter pain medicines, ice, heat as needed  - He can follow-up with me in 6 weeks if not improving for further evaluation and treatment        Isiah Burden [facility] [place]  Large Joint Injection/Arthocentesis: R subacromial bursa    Date/Time: 3/18/2025 9:54 AM    Performed by: Mindi Vera PA-C  Authorized by:  Mindi Vera PA-C    Indications:  Pain  Needle Size:  25 G  Guidance: ultrasound    Approach:  Anterolateral  Location:  Shoulder      Site:  R subacromial bursa  Medications:  3 mL lidocaine 1 %; 2 mL lidocaine 1 %; 2 mL ROPivacaine 5 MG/ML; 6 mg betamethasone acet & sod phos 6 (3-3) MG/ML  Medications comment:  1ml of 8.4% Sodium Bicarbonate solution was used to buffer the local numbing agent for today's injection    Outcome:  Tolerated well, no immediate complications  Procedure discussed: discussed risks, benefits, and alternatives    Consent Given by:  Patient  Timeout: timeout called immediately prior to procedure    Prep: patient was prepped and draped in usual sterile fashion     Ultrasound was used to ensure safe and accurate needle placement and injection. Ultrasound images of the procedure were permanently stored.          -----  Chief Complaint   Patient presents with    Musculoskeletal Problem     Right upper arm       SUBJECTIVE  Jaylen De La Vega is a/an 79 year old male who is seen in consultation at the request of  Mindi Vera PA-C for evaluation of right upper arm .     The patient is seen by themselves.  The patient is Left handed    Onset: 4 month(s) ago. Reports insidious onset without acute precipitating event. Patient states was lifting lights weights and thinks that might of been when pain started.  Location of Pain: right above elbow and below shoulder  Worsened by: reaching back  Better with: nothing  Treatments tried: elevation, Tylenol, home exercises, and physical therapy will start this month Associated symptoms: weakness of arm    Orthopedic/Surgical history: YES - Date: shoulder surgery right more than 20 years, 5 years ago back surgery  Social History/Occupation: retired      REVIEW OF SYSTEMS:  Review of systems negative unless mentioned in HPI     OBJECTIVE:  There were no vitals taken for this visit.   General: healthy, alert and in no distress  Skin: no suspicious lesions or  rash.  CV: distal perfusion intact   Resp: normal respiratory effort without conversational dyspnea   Psych: normal mood and affect  Gait: NORMAL  Neuro: Normal light sensory exam of RU extremity     Shoulder Examination (focused):   Left  Right     Skin intact intact   Inspection No erythema, ecchymosis  No erythema, ecchymosis    Palpation Tenderness: None Tenderness: None   Range of Motion (active) Forward flexion:175   GH Abduction: 90  Reach behind back: T10 Forward flexion:175 painful  GH Abduction: 90 painful   Reach behind back: T10   Range of Motion (passive) Forward flexion:175   GH Abduction: 90  ER at side: 70  ER at 90 deg abduction: 90  IR at 90 deg abduction: 60 Forward flexion:175   GH Abduction: 90  ER at side: 70  ER at 90 deg abduction: 90  IR at 90 deg abduction: 60   Crepitus No No   Strength Internal Rotation at side: 5+  External Rotation at side: 5+  Belly Press: 5+ Internal Rotation at side: 5+  External Rotation at side: 5+ painful  Belly Press: 5+    Special Tests Cartagena: NP  Neer: NP  Joshua: 5+  Speed's: 5+  Cross arm: Negative  Cartagena: NP  Neer: painful   Joshua: 5+ painful   Speed's: 5+  Cross arm: Negative      Other Positive Tests/ Notable Findings O'fredy's: NP  Bicep Load I/II: NP  Internal Impingement: NP  Yergason: NP   ERLS: NP  Hornblower: NP  Bear Hug: NP O'fredy's: NP  Bicep Load I/II: NP  Internal Impingement: NP  Yergason: NP   ERLS: NP  Hornblower: NP  Bear Hug: NP   Instability Generalized laxity: no  Anterior apprehension: Negative  Load and shift (anterior): NP  Load and shift (posterior): NP Generalized laxity: no  Anterior apprehension: Negative  Load and shift (anterior): NP  Load and shift (posterior): NP   Neurologic No abnormal sensation.   Scapular Motion Normal scapular alignment bilaterally without notable protraction/retraction, elevation/depression  No dynamic evidence of scapular dyskinesia           RADIOLOGY:  Final results and radiologist's interpretation,  available in the [facility] health record.  Images were reviewed with the patient in the office today.  My personal interpretation of the performed imaging: Radiographs of the humerus on 3/13/2025 reviewed and show no acute fractures or bony abnormalities.  The glenohumeral joint is not well-visualized there does not appear to be any significant degenerative changes.  There is some widening of the acromioclavicular interval, likely due to an old surgical procedure.

## 2025-03-18 ENCOUNTER — OFFICE VISIT (OUTPATIENT)
Dept: ORTHOPEDICS | Facility: CLINIC | Age: 80
End: 2025-03-18
Payer: COMMERCIAL

## 2025-03-18 DIAGNOSIS — M75.81 ROTATOR CUFF TENDONITIS, RIGHT: ICD-10-CM

## 2025-03-18 RX ORDER — BETAMETHASONE SODIUM PHOSPHATE AND BETAMETHASONE ACETATE 3; 3 MG/ML; MG/ML
6 INJECTION, SUSPENSION INTRA-ARTICULAR; INTRALESIONAL; INTRAMUSCULAR; SOFT TISSUE
Status: COMPLETED | OUTPATIENT
Start: 2025-03-18 | End: 2025-03-18

## 2025-03-18 RX ORDER — LIDOCAINE HYDROCHLORIDE 10 MG/ML
2 INJECTION, SOLUTION INFILTRATION; PERINEURAL
Status: COMPLETED | OUTPATIENT
Start: 2025-03-18 | End: 2025-03-18

## 2025-03-18 RX ORDER — ROPIVACAINE HYDROCHLORIDE 5 MG/ML
2 INJECTION, SOLUTION EPIDURAL; INFILTRATION; PERINEURAL
Status: COMPLETED | OUTPATIENT
Start: 2025-03-18 | End: 2025-03-18

## 2025-03-18 RX ORDER — LIDOCAINE HYDROCHLORIDE 10 MG/ML
3 INJECTION, SOLUTION INFILTRATION; PERINEURAL
Status: COMPLETED | OUTPATIENT
Start: 2025-03-18 | End: 2025-03-18

## 2025-03-18 RX ADMIN — BETAMETHASONE SODIUM PHOSPHATE AND BETAMETHASONE ACETATE 6 MG: 3; 3 INJECTION, SUSPENSION INTRA-ARTICULAR; INTRALESIONAL; INTRAMUSCULAR; SOFT TISSUE at 09:54

## 2025-03-18 RX ADMIN — LIDOCAINE HYDROCHLORIDE 3 ML: 10 INJECTION, SOLUTION INFILTRATION; PERINEURAL at 09:54

## 2025-03-18 RX ADMIN — LIDOCAINE HYDROCHLORIDE 2 ML: 10 INJECTION, SOLUTION INFILTRATION; PERINEURAL at 09:54

## 2025-03-18 RX ADMIN — ROPIVACAINE HYDROCHLORIDE 2 ML: 5 INJECTION, SOLUTION EPIDURAL; INFILTRATION; PERINEURAL at 09:54

## 2025-03-18 NOTE — LETTER
3/18/2025      Jaylen De La Vega  [address]  [place] [address]      Dear Colleague,    Thank you for referring your patient, Jaylen De La Vega, to the [facility] [place]. Please see a copy of my visit note below.    ASSESSMENT & PLAN    Nik was seen today for musculoskeletal problem.    Diagnoses and all orders for this visit:    Rotator cuff tendonitis, right  -     Orthopedic  Referral  -     Large Joint Injection/Arthocentesis: R subacromial bursa      This issue is sub acute and Worsening. Nik presents our clinic today to discuss his subacute right upper extremity pain.  He reports pain in the proximal humerus that occurred insidiously about 4 months ago.  He was doing some light weight lifting at that time, and thinks this may have exacerbated his pain.  We reviewed his previous radiographs of the humerus that did not show any significant degenerative changes of the glenohumeral joint and was unremarkable for any acute bony abnormalities.  On examination, he has significant pain with active and passive range of motion in flexion and abduction, however can fully passively flex and abduct the shoulder.  He also has pain with rotator cuff testing, these findings are most consistent with rotator cuff tendinitis is the main drivers of his symptoms.  Given he does have full passive range of motion adhesive capsulitis is less likely.  We discussed trialing physical therapy and a corticosteroid injection to the subacromial bursa, the patient was amenable to proceeding with this.  We determined the following plan:  - CSI to the right subacromial bursa performed today under ultrasound guidance, see procedure note below for details  - Patient already has physical therapy scheduled and will begin physical therapy in the next 1 to 2 weeks  - Can otherwise utilize over-the-counter pain medicines, ice, heat as needed  - He can follow-up with me in 6 weeks if not improving for  Doxycycline Counseling:  Patient counseled regarding possible photosensitivity and increased risk for sunburn.  Patient instructed to avoid sunlight, if possible.  When exposed to sunlight, patients should wear protective clothing, sunglasses, and sunscreen.  The patient was instructed to call the office immediately if the following severe adverse effects occur:  hearing changes, easy bruising/bleeding, severe headache, or vision changes.  The patient verbalized understanding of the proper use and possible adverse effects of doxycycline.  All of the patient's questions and concerns were addressed. further evaluation and treatment        Isiah Burden DO  [facility]  Large Joint Injection/Arthocentesis: R subacromial bursa    Date/Time: 3/18/2025 9:54 AM    Performed by: Mindi Vera PA-C  Authorized by: Mindi Vera PA-C    Indications:  Pain  Needle Size:  25 G  Guidance: ultrasound    Approach:  Anterolateral  Location:  Shoulder      Site:  R subacromial bursa  Medications:  3 mL lidocaine 1 %; 2 mL lidocaine 1 %; 2 mL ROPivacaine 5 MG/ML; 6 mg betamethasone acet & sod phos 6 (3-3) MG/ML  Medications comment:  1ml of 8.4% Sodium Bicarbonate solution was used to buffer the local numbing agent for today's injection    Outcome:  Tolerated well, no immediate complications  Procedure discussed: discussed risks, benefits, and alternatives    Consent Given by:  Patient  Timeout: timeout called immediately prior to procedure    Prep: patient was prepped and draped in usual sterile fashion     Ultrasound was used to ensure safe and accurate needle placement and injection. Ultrasound images of the procedure were permanently stored.          -----  Chief Complaint   Patient presents with     Musculoskeletal Problem     Right upper arm       SUBJECTIVE  Jaylen De La Vega is a/an 79 year old male who is seen in consultation at the request of  Mindi Vera PA-C for evaluation of right upper arm .     The patient is seen by themselves.  The patient is Left handed    Onset: 4 month(s) ago. Reports insidious onset without acute precipitating event. Patient states was lifting lights weights and thinks that might of been when pain started.  Location of Pain: right above elbow and below shoulder  Worsened by: reaching back  Better with: nothing  Treatments tried: elevation, Tylenol, home exercises, and physical therapy will start this month Associated symptoms: weakness of arm    Orthopedic/Surgical history: YES - Date: shoulder surgery right more than 20 years, 5 years ago  Isotretinoin Pregnancy And Lactation Text: This medication is Pregnancy Category X and is considered extremely dangerous during pregnancy. It is unknown if it is excreted in breast milk. back surgery  Social History/Occupation: retired      REVIEW OF SYSTEMS:  Review of systems negative unless mentioned in HPI     OBJECTIVE:  There were no vitals taken for this visit.   General: healthy, alert and in no distress  Skin: no suspicious lesions or rash.  CV: distal perfusion intact   Resp: normal respiratory effort without conversational dyspnea   Psych: normal mood and affect  Gait: NORMAL  Neuro: Normal light sensory exam of RU extremity     Shoulder Examination (focused):   Left  Right     Skin intact intact   Inspection No erythema, ecchymosis  No erythema, ecchymosis    Palpation Tenderness: None Tenderness: None   Range of Motion (active) Forward flexion:175   GH Abduction: 90  Reach behind back: T10 Forward flexion:175 painful  GH Abduction: 90 painful   Reach behind back: T10   Range of Motion (passive) Forward flexion:175   GH Abduction: 90  ER at side: 70  ER at 90 deg abduction: 90  IR at 90 deg abduction: 60 Forward flexion:175   GH Abduction: 90  ER at side: 70  ER at 90 deg abduction: 90  IR at 90 deg abduction: 60   Crepitus No No   Strength Internal Rotation at side: 5+  External Rotation at side: 5+  Belly Press: 5+ Internal Rotation at side: 5+  External Rotation at side: 5+ painful  Belly Press: 5+    Special Tests Cartagena: NP  Neer: NP  Joshua: 5+  Speed's: 5+  Cross arm: Negative  Cartagena: NP  Neer: painful   Joshua: 5+ painful   Speed's: 5+  Cross arm: Negative      Other Positive Tests/ Notable Findings O'fredy's: NP  Bicep Load I/II: NP  Internal Impingement: NP  Yergason: NP   ERLS: NP  Hornblower: NP  Bear Hug: NP O'fredy's: NP  Bicep Load I/II: NP  Internal Impingement: NP  Yergason: NP   ERLS: NP  Hornblower: NP  Bear Hug: NP   Instability Generalized laxity: no  Anterior apprehension: Negative  Load and shift (anterior): NP  Load and shift (posterior): NP Generalized laxity: no  Anterior apprehension: Negative  Load and shift (anterior): NP  Load and shift (posterior): NP    Neurologic No abnormal sensation.   Scapular Motion Normal scapular alignment bilaterally without notable protraction/retraction, elevation/depression  No dynamic evidence of scapular dyskinesia           RADIOLOGY:  Final results and radiologist's interpretation, available in the [facility] health record.  Images were reviewed with the patient in the office today.  My personal interpretation of the performed imaging: Radiographs of the humerus on 3/13/2025 reviewed and show no acute fractures or bony abnormalities.  The glenohumeral joint is not well-visualized there does not appear to be any significant degenerative changes.  There is some widening of the acromioclavicular interval, likely due to an old surgical procedure.        Again, thank you for allowing me to participate in the care of your patient.        Sincerely,        Isiah Burden, DO    Electronically signed Benzoyl Peroxide Pregnancy And Lactation Text: This medication is Pregnancy Category C. It is unknown if benzoyl peroxide is excreted in breast milk. Birth Control Pills Counseling: Birth Control Pill Counseling: I discussed with the patient the potential side effects of OCPs including but not limited to increased risk of stroke, heart attack, thrombophlebitis, deep venous thrombosis, hepatic adenomas, breast changes, GI upset, headaches, and depression.  The patient verbalized understanding of the proper use and possible adverse effects of OCPs. All of the patient's questions and concerns were addressed. Spironolactone Counseling: Patient advised regarding risks of diarrhea, abdominal pain, hyperkalemia, birth defects (for female patients), liver toxicity and renal toxicity. The patient may need blood work to monitor liver and kidney function and potassium levels while on therapy. The patient verbalized understanding of the proper use and possible adverse effects of spironolactone.  All of the patient's questions and concerns were addressed. Include Pregnancy/Lactation Warning?: No Topical Clindamycin Counseling: Patient counseled that this medication may cause skin irritation or allergic reactions.  In the event of skin irritation, the patient was advised to reduce the amount of the drug applied or use it less frequently.   The patient verbalized understanding of the proper use and possible adverse effects of clindamycin.  All of the patient's questions and concerns were addressed. Azithromycin Counseling:  I discussed with the patient the risks of azithromycin including but not limited to GI upset, allergic reaction, drug rash, diarrhea, and yeast infections. High Dose Vitamin A Counseling: Side effects reviewed, pt to contact office should one occur. Doxycycline Pregnancy And Lactation Text: This medication is Pregnancy Category D and not consider safe during pregnancy. It is also excreted in breast milk but is considered safe for shorter treatment courses. Topical Retinoid counseling:  Patient advised to apply a pea-sized amount only at bedtime and wait 30 minutes after washing their face before applying.  If too drying, patient may add a non-comedogenic moisturizer. The patient verbalized understanding of the proper use and possible adverse effects of retinoids.  All of the patient's questions and concerns were addressed. Spironolactone Pregnancy And Lactation Text: This medication can cause feminization of the male fetus and should be avoided during pregnancy. The active metabolite is also found in breast milk. Birth Control Pills Pregnancy And Lactation Text: This medication should be avoided if pregnant and for the first 30 days post-partum. Topical Clindamycin Pregnancy And Lactation Text: This medication is Pregnancy Category B and is considered safe during pregnancy. It is unknown if it is excreted in breast milk. Erythromycin Counseling:  I discussed with the patient the risks of erythromycin including but not limited to GI upset, allergic reaction, drug rash, diarrhea, increase in liver enzymes, and yeast infections. Tetracycline Counseling: Patient counseled regarding possible photosensitivity and increased risk for sunburn.  Patient instructed to avoid sunlight, if possible.  When exposed to sunlight, patients should wear protective clothing, sunglasses, and sunscreen.  The patient was instructed to call the office immediately if the following severe adverse effects occur:  hearing changes, easy bruising/bleeding, severe headache, or vision changes.  The patient verbalized understanding of the proper use and possible adverse effects of tetracycline.  All of the patient's questions and concerns were addressed. Patient understands to avoid pregnancy while on therapy due to potential birth defects. Azithromycin Pregnancy And Lactation Text: This medication is considered safe during pregnancy and is also secreted in breast milk. High Dose Vitamin A Pregnancy And Lactation Text: High dose vitamin A therapy is contraindicated during pregnancy and breast feeding. Topical Sulfur Applications Counseling: Topical Sulfur Counseling: Patient counseled that this medication may cause skin irritation or allergic reactions.  In the event of skin irritation, the patient was advised to reduce the amount of the drug applied or use it less frequently.   The patient verbalized understanding of the proper use and possible adverse effects of topical sulfur application.  All of the patient's questions and concerns were addressed. Detail Level: Zone Topical Retinoid Pregnancy And Lactation Text: This medication is Pregnancy Category C. It is unknown if this medication is excreted in breast milk. Erythromycin Pregnancy And Lactation Text: This medication is Pregnancy Category B and is considered safe during pregnancy. It is also excreted in breast milk. Dapsone Counseling: I discussed with the patient the risks of dapsone including but not limited to hemolytic anemia, agranulocytosis, rashes, methemoglobinemia, kidney failure, peripheral neuropathy, headaches, GI upset, and liver toxicity.  Patients who start dapsone require monitoring including baseline LFTs and weekly CBCs for the first month, then every month thereafter.  The patient verbalized understanding of the proper use and possible adverse effects of dapsone.  All of the patient's questions and concerns were addressed. Tetracycline Pregnancy And Lactation Text: This medication is Pregnancy Category D and not consider safe during pregnancy. It is also excreted in breast milk. Bactrim Counseling:  I discussed with the patient the risks of sulfa antibiotics including but not limited to GI upset, allergic reaction, drug rash, diarrhea, dizziness, photosensitivity, and yeast infections.  Rarely, more serious reactions can occur including but not limited to aplastic anemia, agranulocytosis, methemoglobinemia, blood dyscrasias, liver or kidney failure, lung infiltrates or desquamative/blistering drug rashes. Minocycline Counseling: Patient advised regarding possible photosensitivity and discoloration of the teeth, skin, lips, tongue and gums.  Patient instructed to avoid sunlight, if possible.  When exposed to sunlight, patients should wear protective clothing, sunglasses, and sunscreen.  The patient was instructed to call the office immediately if the following severe adverse effects occur:  hearing changes, easy bruising/bleeding, severe headache, or vision changes.  The patient verbalized understanding of the proper use and possible adverse effects of minocycline.  All of the patient's questions and concerns were addressed. Tazorac Counseling:  Patient advised that medication is irritating and drying.  Patient may need to apply sparingly and wash off after an hour before eventually leaving it on overnight.  The patient verbalized understanding of the proper use and possible adverse effects of tazorac.  All of the patient's questions and concerns were addressed. Dapsone Pregnancy And Lactation Text: This medication is Pregnancy Category C and is not considered safe during pregnancy or breast feeding. Topical Sulfur Applications Pregnancy And Lactation Text: This medication is Pregnancy Category C and has an unknown safety profile during pregnancy. It is unknown if this topical medication is excreted in breast milk. Bactrim Pregnancy And Lactation Text: This medication is Pregnancy Category D and is known to cause fetal risk.  It is also excreted in breast milk. Isotretinoin Counseling: Patient should get monthly blood tests, not donate blood, not drive at night if vision affected, not share medication, and not undergo elective surgery for 6 months after tx completed. Side effects reviewed, pt to contact office should one occur. Tazorac Pregnancy And Lactation Text: This medication is not safe during pregnancy. It is unknown if this medication is excreted in breast milk. Benzoyl Peroxide Counseling: Patient counseled that medicine may cause skin irritation and bleach clothing.  In the event of skin irritation, the patient was advised to reduce the amount of the drug applied or use it less frequently.   The patient verbalized understanding of the proper use and possible adverse effects of benzoyl peroxide.  All of the patient's questions and concerns were addressed.

## 2025-03-27 ENCOUNTER — THERAPY VISIT (OUTPATIENT)
Dept: PHYSICAL THERAPY | Facility: CLINIC | Age: 80
End: 2025-03-27
Payer: COMMERCIAL

## 2025-03-27 DIAGNOSIS — M79.621 PAIN OF RIGHT UPPER ARM: ICD-10-CM

## 2025-03-27 PROCEDURE — 97161 PT EVAL LOW COMPLEX 20 MIN: CPT | Mod: GP

## 2025-03-27 PROCEDURE — 97110 THERAPEUTIC EXERCISES: CPT | Mod: GP

## 2025-03-27 ASSESSMENT — ACTIVITIES OF DAILY LIVING (ADL)
PLACING_AN_OBJECT_ON_A_HIGH_SHELF: 10
TOUCHING_THE_BACK_OF_YOUR_NECK: 10
WHEN_LYING_ON_THE_INVOLVED_SIDE: 9
AT_ITS_WORST?: 9
PUSHING_WITH_THE_INVOLVED_ARM: 8
CARRYING_A_HEAVY_OBJECT_OF_10_POUNDS: 5
REACHING_FOR_SOMETHING_ON_A_HIGH_SHELF: 10
WASHING_YOUR_HAIR?: 10
PLEASE_INDICATE_YOR_PRIMARY_REASON_FOR_REFERRAL_TO_THERAPY:: SHOULDER
REMOVING_SOMETHING_FROM_YOUR_BACK_POCKET: 6
PUTTING_ON_YOUR_PANTS: 0
PUTTING_ON_A_SHIRT_THAT_BUTTONS_DOWN_THE_FRONT: 0
WASHING_YOUR_BACK: 10

## 2025-03-27 NOTE — PROGRESS NOTES
PHYSICAL THERAPY EVALUATION  Type of Visit: Evaluation              Subjective   Nik comes to physical therapy with an onset of right shoulder pain. This pain began about 4 months ago and will come and go. He had an injection on 3/18 which helped for about a day and a half. He denies a specific HOLLY. He reports the pain will run down his arm into his elbow and is a sharp pain that is worse with reaching, putting on coat, and reaching for seatbelt.       Presenting condition or subjective complaint:    Date of onset: 03/27/25    Relevant medical history: Diabetes   Dates & types of surgery: none    Prior diagnostic imaging/testing results: X-ray     Prior therapy history for the same diagnosis, illness or injury: No      Prior Level of Function  Transfers:   Ambulation:   ADL:   IADL:     Living Environment  Social support: Alone   Type of home: [facility]   Stairs to enter the home: No       Ramp: No   Stairs inside the home: No       Help at home:    Equipment owned: Straight Cane; Grab bars     Employment: No    Hobbies/Interests: walking    Patient goals for therapy: reaching outward    Pain assessment: See objective evaluation for additional pain details     Objective   SHOULDER EVALUATION  PAIN: Pain Level at Rest: 0/10  Pain Level with Use: 8/10  Pain Location: shoulder  Pain Quality: Sharp  Pain Frequency: intermittent  Pain is Worst: no pattern  Pain is Exacerbated By: flexion, crossbody adduction, extension and internal rotation  Pain is Relieved By: NSAIDs and rest  Pain Progression: Unchanged  INTEGUMENTARY (edema, incisions):   POSTURE:  forward head  GAIT:   Weightbearing Status:   Assistive Device(s):   Gait Deviations:   BALANCE/PROPRIOCEPTION:   WEIGHTBEARING ALIGNMENT:   ROM:   (Degrees) Left AROM Left PROM Right AROM  Right PROM   Shoulder Flexion WNL  50    Shoulder Abduction WNL  60    Shoulder Internal Rotation WNL  To PSIS    Shoulder External Rotation WNL  30    Pain:   End feel:     STRENGTH:    Deferred due to lack of motion and pain  FLEXIBILITY:   SPECIAL TESTS:   PALPATION:   + Tenderness At Location Left Right   Acromioclavicular  +   Supraspinatus - +   Infraspinatus + +   Teres minor - -   Deltoid - +   Levator  +   Rhomboids  -   Upper trap  +   Bicipital groove  +     JOINT MOBILITY:   CERVICAL SCREEN:     Assessment & Plan   CLINICAL IMPRESSIONS  Medical Diagnosis: Right shoulder pain    Treatment Diagnosis: Right shoulder pain   Impression/Assessment: Patient is a 79 year old male with right shoulder pain complaints.  The following significant findings have been identified: Pain, Decreased ROM/flexibility, Decreased joint mobility, and Decreased strength. These impairments interfere with their ability to perform self care tasks, recreational activities, and household chores as compared to previous level of function.     Clinical Decision Making (Complexity):  Clinical Presentation: Stable/Uncomplicated  Clinical Presentation Rationale: based on medical and personal factors listed in PT evaluation  Clinical Decision Making (Complexity): Low complexity    PLAN OF CARE  Treatment Interventions:  Interventions: Manual Therapy, Neuromuscular Re-education, Therapeutic Activity, Therapeutic Exercise, Self-Care/Home Management    Long Term Goals     PT Goal 1  Goal Identifier: LTG 1  Goal Description: Patient will restore AROM to WFL in order to perform functional reaching and don/doff clothes without pain  Rationale: to maximize safety and independence with performance of ADLs and functional tasks;to maximize safety and independence within the home;to maximize safety and independence within the community;to maximize safety and independence with self cares  Target Date: 06/19/25      Frequency of Treatment: 1x week  Duration of Treatment: 4 weeks then 2 x month for 8 weeks    Recommended Referrals to Other Professionals:   Education Assessment:   Learner/Method: Patient;No Barriers to Learning    Risks  and benefits of evaluation/treatment have been explained.   Patient/Family/caregiver agrees with Plan of Care.     Evaluation Time:     PT Eval, Low Complexity Minutes (89076): 20       Signing Clinician: ROBIN García [facility]                                                                                   OUTPATIENT PHYSICAL THERAPY      PLAN OF TREATMENT FOR OUTPATIENT REHABILITATION   Patient's Last Name, First Name, Jaylen Solomon YOB: 1945   Provider's Name   [facility]   Medical Record No.  [id number]     Onset Date: 03/27/25  Start of Care Date: 03/27/25     Medical Diagnosis:  Right shoulder pain      PT Treatment Diagnosis:  Right shoulder pain Plan of Treatment  Frequency/Duration: 1x week/ 4 weeks then 2 x month for 8 weeks    Certification date from 03/27/25 to 06/19/25         See note for plan of treatment details and functional goals     Jensen Santacruz, PT                         I CERTIFY THE NEED FOR THESE SERVICES FURNISHED UNDER        THIS PLAN OF TREATMENT AND WHILE UNDER MY CARE     (Physician attestation of this document indicates review and certification of the therapy plan).              Referring Provider:  Mindi Vera    Initial Assessment  See Epic Evaluation- Start of Care Date: 03/27/25

## 2025-04-03 ENCOUNTER — MYC REFILL (OUTPATIENT)
Dept: FAMILY MEDICINE | Facility: CLINIC | Age: 80
End: 2025-04-03
Payer: COMMERCIAL

## 2025-04-03 DIAGNOSIS — J43.9 PULMONARY EMPHYSEMA, UNSPECIFIED EMPHYSEMA TYPE (H): ICD-10-CM

## 2025-04-03 DIAGNOSIS — E11.42 TYPE 2 DIABETES MELLITUS WITH DIABETIC POLYNEUROPATHY, WITHOUT LONG-TERM CURRENT USE OF INSULIN (H): ICD-10-CM

## 2025-04-03 RX ORDER — TIOTROPIUM BROMIDE 18 UG/1
18 CAPSULE ORAL; RESPIRATORY (INHALATION) DAILY
Qty: 90 CAPSULE | Refills: 0 | Status: SHIPPED | OUTPATIENT
Start: 2025-04-03

## 2025-04-06 ENCOUNTER — MYC REFILL (OUTPATIENT)
Dept: FAMILY MEDICINE | Facility: CLINIC | Age: 80
End: 2025-04-06
Payer: COMMERCIAL

## 2025-04-06 DIAGNOSIS — E11.42 TYPE 2 DIABETES MELLITUS WITH DIABETIC POLYNEUROPATHY, WITHOUT LONG-TERM CURRENT USE OF INSULIN (H): ICD-10-CM

## 2025-04-07 RX ORDER — GABAPENTIN 300 MG/1
300 CAPSULE ORAL 3 TIMES DAILY
Qty: 270 CAPSULE | Refills: 3 | Status: SHIPPED | OUTPATIENT
Start: 2025-04-07

## 2025-04-14 ENCOUNTER — THERAPY VISIT (OUTPATIENT)
Dept: PHYSICAL THERAPY | Facility: CLINIC | Age: 80
End: 2025-04-14
Payer: COMMERCIAL

## 2025-04-14 DIAGNOSIS — M79.621 PAIN OF RIGHT UPPER ARM: Primary | ICD-10-CM

## 2025-04-14 PROCEDURE — 97110 THERAPEUTIC EXERCISES: CPT | Mod: GP

## 2025-04-28 ENCOUNTER — THERAPY VISIT (OUTPATIENT)
Dept: PHYSICAL THERAPY | Facility: CLINIC | Age: 80
End: 2025-04-28
Payer: COMMERCIAL

## 2025-04-28 DIAGNOSIS — M79.621 PAIN OF RIGHT UPPER ARM: Primary | ICD-10-CM

## 2025-04-28 PROCEDURE — 97530 THERAPEUTIC ACTIVITIES: CPT | Mod: GP

## 2025-04-28 PROCEDURE — 97110 THERAPEUTIC EXERCISES: CPT | Mod: GP

## 2025-05-03 ENCOUNTER — HEALTH MAINTENANCE LETTER (OUTPATIENT)
Age: 80
End: 2025-05-03

## 2025-05-12 ENCOUNTER — THERAPY VISIT (OUTPATIENT)
Dept: PHYSICAL THERAPY | Facility: CLINIC | Age: 80
End: 2025-05-12
Payer: COMMERCIAL

## 2025-05-12 DIAGNOSIS — M79.621 PAIN OF RIGHT UPPER ARM: Primary | ICD-10-CM

## 2025-05-12 PROCEDURE — 97110 THERAPEUTIC EXERCISES: CPT | Mod: GP

## 2025-05-12 PROCEDURE — 97530 THERAPEUTIC ACTIVITIES: CPT | Mod: GP

## 2025-05-14 DIAGNOSIS — E11.42 TYPE 2 DIABETES MELLITUS WITH DIABETIC POLYNEUROPATHY, WITHOUT LONG-TERM CURRENT USE OF INSULIN (H): Primary | ICD-10-CM

## 2025-05-14 RX ORDER — HYDROCHLOROTHIAZIDE 12.5 MG/1
CAPSULE ORAL
Qty: 6 EACH | Refills: 1 | Status: SHIPPED | OUTPATIENT
Start: 2025-05-14

## 2025-05-14 NOTE — PROGRESS NOTES
Got note from Nik that FreeStyle Edna 2 sensor is being discontinued.  Transitioned to FreeStyle Edna 3 Plus sensor.  Chart reviewed.  Rx sent to pt's preferred pharmacy.    [id number] - [place], [place] - [address] [address]    Tj Verma MD

## 2025-05-29 ENCOUNTER — TELEPHONE (OUTPATIENT)
Dept: OTHER | Facility: CLINIC | Age: 80
End: 2025-05-29

## 2025-05-29 NOTE — TELEPHONE ENCOUNTER
LM for patient to call and schedule imaging US MARISA Doppler with Exercise Bilateral and US Lower Extremity Arterial Duplex Left in June, 2025.    Patient will be called with the results.    Appt Notes:  s/p left popliteal artery PTA. Comparison study done 6/26/2024 1 year f/u. Call with results.

## 2025-06-11 DIAGNOSIS — E11.42 TYPE 2 DIABETES MELLITUS WITH DIABETIC POLYNEUROPATHY, WITHOUT LONG-TERM CURRENT USE OF INSULIN (H): ICD-10-CM

## 2025-06-12 RX ORDER — SEMAGLUTIDE 0.68 MG/ML
0.5 INJECTION, SOLUTION SUBCUTANEOUS
Qty: 9 ML | Refills: 1 | Status: SHIPPED | OUTPATIENT
Start: 2025-06-12

## 2025-06-12 NOTE — TELEPHONE ENCOUNTER
Chart reviewed.  Rx sent to pt's preferred pharmacy.    [id number] - [place], [place] - [address] [address]    Tj Verma MD

## 2025-06-16 DIAGNOSIS — I10 ESSENTIAL HYPERTENSION: ICD-10-CM

## 2025-06-16 RX ORDER — LOSARTAN POTASSIUM 100 MG/1
100 TABLET ORAL DAILY
Qty: 90 TABLET | Refills: 0 | Status: SHIPPED | OUTPATIENT
Start: 2025-06-16

## 2025-06-25 ENCOUNTER — TELEPHONE (OUTPATIENT)
Dept: OTHER | Facility: CLINIC | Age: 80
End: 2025-06-25
Payer: COMMERCIAL

## 2025-06-25 ENCOUNTER — HOSPITAL ENCOUNTER (OUTPATIENT)
Dept: ULTRASOUND IMAGING | Facility: CLINIC | Age: 80
Discharge: HOME | End: 2025-06-25
Attending: RADIOLOGY
Payer: COMMERCIAL

## 2025-06-25 ENCOUNTER — TELEPHONE (OUTPATIENT)
Dept: FAMILY MEDICINE | Facility: CLINIC | Age: 80
End: 2025-06-25
Payer: COMMERCIAL

## 2025-06-25 DIAGNOSIS — I70.212 ATHEROSCLEROSIS OF NATIVE ARTERIES OF EXTREMITIES WITH INTERMITTENT CLAUDICATION, LEFT LEG: ICD-10-CM

## 2025-06-25 DIAGNOSIS — E11.42 TYPE 2 DIABETES MELLITUS WITH DIABETIC POLYNEUROPATHY, WITHOUT LONG-TERM CURRENT USE OF INSULIN (H): Primary | ICD-10-CM

## 2025-06-25 PROCEDURE — 93924 LWR XTR VASC STDY BILAT: CPT

## 2025-06-25 PROCEDURE — 93926 LOWER EXTREMITY STUDY: CPT | Mod: LT

## 2025-06-25 NOTE — TELEPHONE ENCOUNTER
Patient scheduled with Dr. Orellana on 6/30.  US mei and ultrasound are normal.  Unsure if patient needs to be seen.  Left VM.  Patito Kelly RN  IR nurse clinician  [phone]

## 2025-06-26 ENCOUNTER — RESULTS FOLLOW-UP (OUTPATIENT)
Dept: FAMILY MEDICINE | Facility: CLINIC | Age: 80
End: 2025-06-26

## 2025-06-26 ENCOUNTER — LAB (OUTPATIENT)
Dept: LAB | Facility: CLINIC | Age: 80
End: 2025-06-26
Payer: COMMERCIAL

## 2025-06-26 DIAGNOSIS — I10 ESSENTIAL HYPERTENSION: ICD-10-CM

## 2025-06-26 DIAGNOSIS — I70.212 ATHEROSCLEROSIS OF NATIVE ARTERIES OF EXTREMITIES WITH INTERMITTENT CLAUDICATION, LEFT LEG: Primary | ICD-10-CM

## 2025-06-26 DIAGNOSIS — E11.42 TYPE 2 DIABETES MELLITUS WITH DIABETIC POLYNEUROPATHY, WITHOUT LONG-TERM CURRENT USE OF INSULIN (H): ICD-10-CM

## 2025-06-26 DIAGNOSIS — Z13.6 SCREENING FOR CARDIOVASCULAR CONDITION: Primary | ICD-10-CM

## 2025-06-26 LAB
EST. AVERAGE GLUCOSE BLD GHB EST-MCNC: 177 MG/DL
HBA1C MFR BLD: 7.8 % (ref 0–5.6)

## 2025-07-04 DIAGNOSIS — J43.9 PULMONARY EMPHYSEMA, UNSPECIFIED EMPHYSEMA TYPE (H): ICD-10-CM

## 2025-07-07 RX ORDER — TIOTROPIUM BROMIDE 18 UG/1
18 CAPSULE ORAL; RESPIRATORY (INHALATION) DAILY
Qty: 90 CAPSULE | Refills: 0 | Status: SHIPPED | OUTPATIENT
Start: 2025-07-07

## 2025-08-12 ENCOUNTER — TELEPHONE (OUTPATIENT)
Dept: PHARMACY | Facility: OTHER | Age: 80
End: 2025-08-12
Payer: COMMERCIAL

## 2025-08-16 DIAGNOSIS — E11.42 TYPE 2 DIABETES MELLITUS WITH DIABETIC POLYNEUROPATHY, WITHOUT LONG-TERM CURRENT USE OF INSULIN (H): ICD-10-CM

## 2025-08-18 RX ORDER — METFORMIN HYDROCHLORIDE 500 MG/1
1000 TABLET, EXTENDED RELEASE ORAL
Qty: 180 TABLET | Refills: 0 | Status: SHIPPED | OUTPATIENT
Start: 2025-08-18

## (undated) DEVICE — LINEN ORTHO ACL PACK 5447

## (undated) DEVICE — SU MONOCRYL 4-0 PS-2 27" UND Y426H

## (undated) DEVICE — ADH SKIN CLOSURE PREMIERPRO EXOFIN 1.0ML 3470

## (undated) DEVICE — NDL BLUNT 18GA 1" W/O FILTER 305181

## (undated) DEVICE — PREP CHLORAPREP 26ML TINTED ORANGE  260815

## (undated) DEVICE — PACK SMALL SPINE RIDGES

## (undated) DEVICE — GLOVE PROTEXIS MICRO 7.5  2D73PM75

## (undated) DEVICE — DRAPE MICROSCOPE OPMI ZEISS 48X118" 306071-0000-000

## (undated) DEVICE — MIDAS REX DISSECTING TOOL 5.5MM T14MH25

## (undated) DEVICE — SU VICRYL 0 UR-6 27" J603H

## (undated) DEVICE — SUCTION MANIFOLD NEPTUNE 2 SYS 4 PORT 0702-020-000

## (undated) DEVICE — GLOVE PROTEXIS BLUE W/NEU-THERA 8.0  2D73EB80

## (undated) DEVICE — DRAPE STERI TOWEL LG 1010

## (undated) DEVICE — SPONGE COTTONOID 1/2X1/2" 80-1400

## (undated) DEVICE — ESU ELEC BLADE 6" COATED/INSULATED E1455-6

## (undated) DEVICE — SOL NACL 0.9% IRRIG 1000ML BOTTLE 2F7124

## (undated) DEVICE — DRSG STERI STRIP 1/2X4" R1547

## (undated) DEVICE — ESU GROUND PAD ADULT W/CORD E7507

## (undated) DEVICE — GLOVE PROTEXIS W/NEU-THERA 8.0  2D73TE80

## (undated) DEVICE — GLOVE PROTEXIS BLUE W/NEU-THERA 8.5  2D73EB85

## (undated) DEVICE — NDL ANGIOCATH 14GA 1.25" 3068

## (undated) DEVICE — SU VICRYL 2-0 CT-2 CR 8X18" J726D

## (undated) DEVICE — SOL ADH LIQUID BENZOIN SWAB 0.6ML C1544

## (undated) DEVICE — DRAPE MAYO STAND 23X54 8337

## (undated) DEVICE — SU ETHILON 3-0 PS-1 18" 1663H

## (undated) DEVICE — POSITIONER PT PRONESAFE HEAD REST W/DERMAPROX INSERT 40599

## (undated) DEVICE — DRAPE COVER C-ARM SEAMLESS SNAP-KAP 03-KP26 LATEX FREE

## (undated) RX ORDER — FENTANYL CITRATE-0.9 % NACL/PF 10 MCG/ML
PLASTIC BAG, INJECTION (ML) INTRAVENOUS
Status: DISPENSED
Start: 2020-08-19

## (undated) RX ORDER — HEPARIN SODIUM 200 [USP'U]/100ML
INJECTION, SOLUTION INTRAVENOUS
Status: DISPENSED
Start: 2022-09-19

## (undated) RX ORDER — FENTANYL CITRATE 50 UG/ML
INJECTION, SOLUTION INTRAMUSCULAR; INTRAVENOUS
Status: DISPENSED
Start: 2022-09-19

## (undated) RX ORDER — HEPARIN SODIUM 1000 [USP'U]/ML
INJECTION, SOLUTION INTRAVENOUS; SUBCUTANEOUS
Status: DISPENSED
Start: 2023-05-16

## (undated) RX ORDER — HEPARIN SODIUM 1000 [USP'U]/ML
INJECTION, SOLUTION INTRAVENOUS; SUBCUTANEOUS
Status: DISPENSED
Start: 2022-09-19

## (undated) RX ORDER — HEPARIN SODIUM 200 [USP'U]/100ML
INJECTION, SOLUTION INTRAVENOUS
Status: DISPENSED
Start: 2023-05-16

## (undated) RX ORDER — ACETAMINOPHEN 325 MG/1
TABLET ORAL
Status: DISPENSED
Start: 2020-08-19

## (undated) RX ORDER — BUPIVACAINE HYDROCHLORIDE AND EPINEPHRINE 2.5; 5 MG/ML; UG/ML
INJECTION, SOLUTION EPIDURAL; INFILTRATION; INTRACAUDAL; PERINEURAL
Status: DISPENSED
Start: 2020-08-19

## (undated) RX ORDER — OXYCODONE HYDROCHLORIDE 5 MG/1
TABLET ORAL
Status: DISPENSED
Start: 2020-08-19

## (undated) RX ORDER — FENTANYL CITRATE 50 UG/ML
INJECTION, SOLUTION INTRAMUSCULAR; INTRAVENOUS
Status: DISPENSED
Start: 2023-05-16

## (undated) RX ORDER — CEFAZOLIN SODIUM 2 G/100ML
INJECTION, SOLUTION INTRAVENOUS
Status: DISPENSED
Start: 2020-08-19

## (undated) RX ORDER — FENTANYL CITRATE 50 UG/ML
INJECTION, SOLUTION INTRAMUSCULAR; INTRAVENOUS
Status: DISPENSED
Start: 2020-08-19

## (undated) RX ORDER — LIDOCAINE HYDROCHLORIDE 10 MG/ML
INJECTION, SOLUTION INFILTRATION; PERINEURAL
Status: DISPENSED
Start: 2023-05-16